# Patient Record
Sex: FEMALE | Race: WHITE | NOT HISPANIC OR LATINO | URBAN - METROPOLITAN AREA
[De-identification: names, ages, dates, MRNs, and addresses within clinical notes are randomized per-mention and may not be internally consistent; named-entity substitution may affect disease eponyms.]

---

## 2018-02-02 ENCOUNTER — OUTPATIENT (OUTPATIENT)
Dept: OUTPATIENT SERVICES | Facility: HOSPITAL | Age: 78
LOS: 1 days | Discharge: HOME | End: 2018-02-02

## 2018-02-02 DIAGNOSIS — M54.9 DORSALGIA, UNSPECIFIED: ICD-10-CM

## 2018-03-23 ENCOUNTER — OUTPATIENT (OUTPATIENT)
Dept: OUTPATIENT SERVICES | Facility: HOSPITAL | Age: 78
LOS: 1 days | Discharge: HOME | End: 2018-03-23

## 2018-03-23 VITALS
RESPIRATION RATE: 16 BRPM | TEMPERATURE: 99 F | SYSTOLIC BLOOD PRESSURE: 132 MMHG | OXYGEN SATURATION: 96 % | HEART RATE: 96 BPM | HEIGHT: 62 IN | DIASTOLIC BLOOD PRESSURE: 60 MMHG | WEIGHT: 175.93 LBS

## 2018-03-23 DIAGNOSIS — Z01.818 ENCOUNTER FOR OTHER PREPROCEDURAL EXAMINATION: ICD-10-CM

## 2018-03-23 DIAGNOSIS — D68.8 OTHER SPECIFIED COAGULATION DEFECTS: ICD-10-CM

## 2018-03-23 DIAGNOSIS — Z98.84 BARIATRIC SURGERY STATUS: Chronic | ICD-10-CM

## 2018-03-23 DIAGNOSIS — M21.969 UNSPECIFIED ACQUIRED DEFORMITY OF UNSPECIFIED LOWER LEG: Chronic | ICD-10-CM

## 2018-03-23 LAB
ALBUMIN SERPL ELPH-MCNC: 4.3 G/DL — SIGNIFICANT CHANGE UP (ref 3.5–5.2)
ALP SERPL-CCNC: 125 U/L — HIGH (ref 30–115)
ALT FLD-CCNC: 58 U/L — HIGH (ref 0–41)
ANION GAP SERPL CALC-SCNC: 16 MMOL/L — HIGH (ref 7–14)
APPEARANCE UR: CLEAR — SIGNIFICANT CHANGE UP
APTT BLD: 27.7 SEC — SIGNIFICANT CHANGE UP (ref 27–39.2)
AST SERPL-CCNC: 64 U/L — HIGH (ref 0–41)
BACTERIA # UR AUTO: (no result) /HPF
BASOPHILS # BLD AUTO: 0.02 K/UL — SIGNIFICANT CHANGE UP (ref 0–0.2)
BASOPHILS NFR BLD AUTO: 0.6 % — SIGNIFICANT CHANGE UP (ref 0–1)
BILIRUB SERPL-MCNC: 1.3 MG/DL — HIGH (ref 0.2–1.2)
BILIRUB UR-MCNC: SIGNIFICANT CHANGE UP
BLD GP AB SCN SERPL QL: SIGNIFICANT CHANGE UP
BUN SERPL-MCNC: 24 MG/DL — HIGH (ref 10–20)
CALCIUM SERPL-MCNC: 9.1 MG/DL — SIGNIFICANT CHANGE UP (ref 8.5–10.1)
CHLORIDE SERPL-SCNC: 100 MMOL/L — SIGNIFICANT CHANGE UP (ref 98–110)
CO2 SERPL-SCNC: 25 MMOL/L — SIGNIFICANT CHANGE UP (ref 17–32)
COD CRY URNS QL: (no result) /HPF
COLOR SPEC: YELLOW — SIGNIFICANT CHANGE UP
CREAT SERPL-MCNC: 1 MG/DL — SIGNIFICANT CHANGE UP (ref 0.7–1.5)
DIFF PNL FLD: NEGATIVE — SIGNIFICANT CHANGE UP
EOSINOPHIL # BLD AUTO: 0.01 K/UL — SIGNIFICANT CHANGE UP (ref 0–0.7)
EOSINOPHIL NFR BLD AUTO: 0.3 % — SIGNIFICANT CHANGE UP (ref 0–8)
GLUCOSE SERPL-MCNC: 222 MG/DL — HIGH (ref 70–99)
GLUCOSE UR QL: NEGATIVE MG/DL — SIGNIFICANT CHANGE UP
HCT VFR BLD CALC: 38.1 % — SIGNIFICANT CHANGE UP (ref 37–47)
HGB BLD-MCNC: 12.3 G/DL — SIGNIFICANT CHANGE UP (ref 12–16)
IMM GRANULOCYTES NFR BLD AUTO: 0.3 % — SIGNIFICANT CHANGE UP (ref 0.1–0.3)
INR BLD: 1.13 RATIO — SIGNIFICANT CHANGE UP (ref 0.65–1.3)
KETONES UR-MCNC: NEGATIVE — SIGNIFICANT CHANGE UP
LEUKOCYTE ESTERASE UR-ACNC: NEGATIVE — SIGNIFICANT CHANGE UP
LYMPHOCYTES # BLD AUTO: 0.76 K/UL — LOW (ref 1.2–3.4)
LYMPHOCYTES # BLD AUTO: 21.8 % — SIGNIFICANT CHANGE UP (ref 20.5–51.1)
MCHC RBC-ENTMCNC: 32.3 G/DL — SIGNIFICANT CHANGE UP (ref 32–37)
MCHC RBC-ENTMCNC: 32.9 PG — HIGH (ref 27–31)
MCV RBC AUTO: 101.9 FL — HIGH (ref 81–99)
MONOCYTES # BLD AUTO: 0.08 K/UL — LOW (ref 0.1–0.6)
MONOCYTES NFR BLD AUTO: 2.3 % — SIGNIFICANT CHANGE UP (ref 1.7–9.3)
NEUTROPHILS # BLD AUTO: 2.6 K/UL — SIGNIFICANT CHANGE UP (ref 1.4–6.5)
NEUTROPHILS NFR BLD AUTO: 74.7 % — SIGNIFICANT CHANGE UP (ref 42.2–75.2)
NITRITE UR-MCNC: POSITIVE — SIGNIFICANT CHANGE UP
NRBC # BLD: 0 /100 WBCS — SIGNIFICANT CHANGE UP (ref 0–0)
PH UR: 6 — SIGNIFICANT CHANGE UP (ref 5–8)
PLATELET # BLD AUTO: 177 K/UL — SIGNIFICANT CHANGE UP (ref 130–400)
POTASSIUM SERPL-MCNC: 4.2 MMOL/L — SIGNIFICANT CHANGE UP (ref 3.5–5)
POTASSIUM SERPL-SCNC: 4.2 MMOL/L — SIGNIFICANT CHANGE UP (ref 3.5–5)
PROT SERPL-MCNC: 6.3 G/DL — SIGNIFICANT CHANGE UP (ref 6–8)
PROT UR-MCNC: 300 MG/DL — SIGNIFICANT CHANGE UP
PROTHROM AB SERPL-ACNC: 12.2 SEC — SIGNIFICANT CHANGE UP (ref 9.95–12.87)
RBC # BLD: 3.74 M/UL — LOW (ref 4.2–5.4)
RBC # FLD: 18.9 % — HIGH (ref 11.5–14.5)
SODIUM SERPL-SCNC: 141 MMOL/L — SIGNIFICANT CHANGE UP (ref 135–146)
SP GR SPEC: 1.02 — SIGNIFICANT CHANGE UP (ref 1.01–1.03)
TYPE + AB SCN PNL BLD: SIGNIFICANT CHANGE UP
UROBILINOGEN FLD QL: NEGATIVE MG/DL — SIGNIFICANT CHANGE UP
WBC # BLD: 3.48 K/UL — LOW (ref 4.8–10.8)
WBC # FLD AUTO: 3.48 K/UL — LOW (ref 4.8–10.8)

## 2018-03-23 RX ORDER — CARBIDOPA AND LEVODOPA 25; 100 MG/1; MG/1
0 TABLET ORAL
Qty: 0 | Refills: 0 | COMMUNITY

## 2018-03-23 NOTE — H&P PST ADULT - ATTENDING COMMENTS
04-06-18 @ 07:04  YONATAN MEADE  [id number]  77yFemale    I have discussed the risks and benefits of kyphoplasty with the patient including but not limited to bleeding, infection, weakness, numbness, paralysis, CSF leak requiring repair, no change or increase in pain or other symptoms, change in bowel/bladder/sexual function, need for decompression, revision, repeat or other procedure(s).  I have also discussed the possibility of the following:  Use of allografts/autografts/biologics/hardware  Adjacent segment progression requiring treatment(s)  Extended hospital/rehab/skilled nursing facility stay  Need for flat bedrest    I have also discussed the alternatives to the procedure as well as options for no treatment and/or expected courses for all.  I also discussed the role of any MD/PA first assistant and the patient assents to their participation.  All questions were answered and the patient wishes to proceed.

## 2018-04-05 NOTE — ASU PATIENT PROFILE, ADULT - PMH
Fibromyalgia    GERD (gastroesophageal reflux disease)    Hypothyroidism  no recent dosage changes  Obesity  s/p gastric bypass ~15 yrs ago ~100 lb loss  DWAYNE on CPAP    Osteoarthritis    Psoriatic arthritis    Rheumatoid arthritis    Seasonal allergies    Tremors of nervous system  essential tremors

## 2018-04-06 ENCOUNTER — INPATIENT (INPATIENT)
Facility: HOSPITAL | Age: 78
LOS: 11 days | Discharge: NURSING, REHAB OR LONG-TERM CARE | End: 2018-04-18
Attending: NEUROLOGICAL SURGERY | Admitting: NEUROLOGICAL SURGERY

## 2018-04-06 ENCOUNTER — RESULT REVIEW (OUTPATIENT)
Age: 78
End: 2018-04-06

## 2018-04-06 VITALS
DIASTOLIC BLOOD PRESSURE: 75 MMHG | SYSTOLIC BLOOD PRESSURE: 148 MMHG | HEART RATE: 94 BPM | HEIGHT: 62 IN | WEIGHT: 175.93 LBS | RESPIRATION RATE: 18 BRPM | TEMPERATURE: 98 F

## 2018-04-06 DIAGNOSIS — K21.9 GASTRO-ESOPHAGEAL REFLUX DISEASE WITHOUT ESOPHAGITIS: ICD-10-CM

## 2018-04-06 DIAGNOSIS — E66.9 OBESITY, UNSPECIFIED: ICD-10-CM

## 2018-04-06 DIAGNOSIS — G25.0 ESSENTIAL TREMOR: ICD-10-CM

## 2018-04-06 DIAGNOSIS — M79.7 FIBROMYALGIA: ICD-10-CM

## 2018-04-06 DIAGNOSIS — Z98.84 BARIATRIC SURGERY STATUS: ICD-10-CM

## 2018-04-06 DIAGNOSIS — Z98.84 BARIATRIC SURGERY STATUS: Chronic | ICD-10-CM

## 2018-04-06 DIAGNOSIS — M21.969 UNSPECIFIED ACQUIRED DEFORMITY OF UNSPECIFIED LOWER LEG: Chronic | ICD-10-CM

## 2018-04-06 DIAGNOSIS — M19.90 UNSPECIFIED OSTEOARTHRITIS, UNSPECIFIED SITE: ICD-10-CM

## 2018-04-06 DIAGNOSIS — E03.9 HYPOTHYROIDISM, UNSPECIFIED: ICD-10-CM

## 2018-04-06 DIAGNOSIS — M06.9 RHEUMATOID ARTHRITIS, UNSPECIFIED: ICD-10-CM

## 2018-04-06 DIAGNOSIS — L40.50 ARTHROPATHIC PSORIASIS, UNSPECIFIED: ICD-10-CM
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
## 2018-04-06 DIAGNOSIS — G47.33 OBSTRUCTIVE SLEEP APNEA (ADULT) (PEDIATRIC): ICD-10-CM

## 2018-04-06 DIAGNOSIS — M80.08XA AGE-RELATED OSTEOPOROSIS WITH CURRENT PATHOLOGICAL FRACTURE, VERTEBRA(E), INITIAL ENCOUNTER FOR FRACTURE: ICD-10-CM

## 2018-04-06 DIAGNOSIS — T81.718A COMPLICATION OF OTHER ARTERY FOLLOWING A PROCEDURE, NOT ELSEWHERE CLASSIFIED, INITIAL ENCOUNTER: ICD-10-CM

## 2018-04-06 DIAGNOSIS — I26.99 OTHER PULMONARY EMBOLISM WITHOUT ACUTE COR PULMONALE: ICD-10-CM

## 2018-04-06 DIAGNOSIS — K29.70 GASTRITIS, UNSPECIFIED, WITHOUT BLEEDING: ICD-10-CM

## 2018-04-06 DIAGNOSIS — J98.11 ATELECTASIS: ICD-10-CM

## 2018-04-06 RX ORDER — FENTANYL CITRATE 50 UG/ML
1 INJECTION INTRAVENOUS
Qty: 0 | Refills: 0 | Status: DISCONTINUED | OUTPATIENT
Start: 2018-04-06 | End: 2018-04-13

## 2018-04-06 RX ORDER — SENNA PLUS 8.6 MG/1
2 TABLET ORAL AT BEDTIME
Qty: 0 | Refills: 0 | Status: DISCONTINUED | OUTPATIENT
Start: 2018-04-06 | End: 2018-04-18

## 2018-04-06 RX ORDER — CARBIDOPA AND LEVODOPA 25; 100 MG/1; MG/1
1 TABLET ORAL THREE TIMES A DAY
Qty: 0 | Refills: 0 | Status: DISCONTINUED | OUTPATIENT
Start: 2018-04-06 | End: 2018-04-18

## 2018-04-06 RX ORDER — ACETAMINOPHEN 500 MG
650 TABLET ORAL EVERY 4 HOURS
Qty: 0 | Refills: 0 | Status: DISCONTINUED | OUTPATIENT
Start: 2018-04-06 | End: 2018-04-18

## 2018-04-06 RX ORDER — METHOCARBAMOL 500 MG/1
1 TABLET, FILM COATED ORAL
Qty: 30 | Refills: 0 | OUTPATIENT
Start: 2018-04-06

## 2018-04-06 RX ORDER — MORPHINE SULFATE 50 MG/1
4 CAPSULE, EXTENDED RELEASE ORAL
Qty: 0 | Refills: 0 | Status: DISCONTINUED | OUTPATIENT
Start: 2018-04-06 | End: 2018-04-06

## 2018-04-06 RX ORDER — OXYCODONE AND ACETAMINOPHEN 5; 325 MG/1; MG/1
2 TABLET ORAL EVERY 6 HOURS
Qty: 0 | Refills: 0 | Status: DISCONTINUED | OUTPATIENT
Start: 2018-04-06 | End: 2018-04-06

## 2018-04-06 RX ORDER — ONDANSETRON 8 MG/1
4 TABLET, FILM COATED ORAL EVERY 6 HOURS
Qty: 0 | Refills: 0 | Status: DISCONTINUED | OUTPATIENT
Start: 2018-04-06 | End: 2018-04-18

## 2018-04-06 RX ORDER — METHOCARBAMOL 500 MG/1
750 TABLET, FILM COATED ORAL EVERY 8 HOURS
Qty: 0 | Refills: 0 | Status: DISCONTINUED | OUTPATIENT
Start: 2018-04-06 | End: 2018-04-10

## 2018-04-06 RX ORDER — ONDANSETRON 8 MG/1
4 TABLET, FILM COATED ORAL ONCE
Qty: 0 | Refills: 0 | Status: DISCONTINUED | OUTPATIENT
Start: 2018-04-06 | End: 2018-04-06

## 2018-04-06 RX ORDER — MONTELUKAST 4 MG/1
10 TABLET, CHEWABLE ORAL DAILY
Qty: 0 | Refills: 0 | Status: DISCONTINUED | OUTPATIENT
Start: 2018-04-06 | End: 2018-04-18

## 2018-04-06 RX ORDER — MORPHINE SULFATE 50 MG/1
3 CAPSULE, EXTENDED RELEASE ORAL ONCE
Qty: 0 | Refills: 0 | Status: DISCONTINUED | OUTPATIENT
Start: 2018-04-06 | End: 2018-04-06

## 2018-04-06 RX ORDER — SODIUM CHLORIDE 9 MG/ML
1000 INJECTION INTRAMUSCULAR; INTRAVENOUS; SUBCUTANEOUS
Qty: 0 | Refills: 0 | Status: DISCONTINUED | OUTPATIENT
Start: 2018-04-06 | End: 2018-04-10

## 2018-04-06 RX ORDER — MORPHINE SULFATE 50 MG/1
30 CAPSULE, EXTENDED RELEASE ORAL
Qty: 0 | Refills: 0 | Status: DISCONTINUED | OUTPATIENT
Start: 2018-04-06 | End: 2018-04-13

## 2018-04-06 RX ORDER — ACETAMINOPHEN 500 MG
1000 TABLET ORAL ONCE
Qty: 0 | Refills: 0 | Status: DISCONTINUED | OUTPATIENT
Start: 2018-04-06 | End: 2018-04-18

## 2018-04-06 RX ORDER — DOCUSATE SODIUM 100 MG
100 CAPSULE ORAL THREE TIMES A DAY
Qty: 0 | Refills: 0 | Status: DISCONTINUED | OUTPATIENT
Start: 2018-04-06 | End: 2018-04-18

## 2018-04-06 RX ORDER — CEFAZOLIN SODIUM 1 G
1000 VIAL (EA) INJECTION EVERY 8 HOURS
Qty: 0 | Refills: 0 | Status: COMPLETED | OUTPATIENT
Start: 2018-04-06 | End: 2018-04-07

## 2018-04-06 RX ORDER — LEVOTHYROXINE SODIUM 125 MCG
75 TABLET ORAL DAILY
Qty: 0 | Refills: 0 | Status: DISCONTINUED | OUTPATIENT
Start: 2018-04-06 | End: 2018-04-18

## 2018-04-06 RX ADMIN — MORPHINE SULFATE 4 MILLIGRAM(S): 50 CAPSULE, EXTENDED RELEASE ORAL at 11:00

## 2018-04-06 RX ADMIN — CARBIDOPA AND LEVODOPA 1 TABLET(S): 25; 100 TABLET ORAL at 21:55

## 2018-04-06 RX ADMIN — MORPHINE SULFATE 4 MILLIGRAM(S): 50 CAPSULE, EXTENDED RELEASE ORAL at 10:36

## 2018-04-06 RX ADMIN — MORPHINE SULFATE 4 MILLIGRAM(S): 50 CAPSULE, EXTENDED RELEASE ORAL at 11:32

## 2018-04-06 RX ADMIN — MORPHINE SULFATE 3 MILLIGRAM(S): 50 CAPSULE, EXTENDED RELEASE ORAL at 09:56

## 2018-04-06 RX ADMIN — MORPHINE SULFATE 3 MILLIGRAM(S): 50 CAPSULE, EXTENDED RELEASE ORAL at 10:29

## 2018-04-06 RX ADMIN — MORPHINE SULFATE 4 MILLIGRAM(S): 50 CAPSULE, EXTENDED RELEASE ORAL at 10:45

## 2018-04-06 RX ADMIN — METHOCARBAMOL 750 MILLIGRAM(S): 500 TABLET, FILM COATED ORAL at 21:55

## 2018-04-06 RX ADMIN — MONTELUKAST 10 MILLIGRAM(S): 4 TABLET, CHEWABLE ORAL at 21:55

## 2018-04-06 RX ADMIN — SENNA PLUS 2 TABLET(S): 8.6 TABLET ORAL at 21:54

## 2018-04-06 RX ADMIN — Medication 100 MILLIGRAM(S): at 22:17

## 2018-04-06 RX ADMIN — Medication 100 MILLIGRAM(S): at 21:55

## 2018-04-06 NOTE — ASU DISCHARGE PLAN (ADULT/PEDIATRIC). - NOTIFY
Numbness, color, or temperature change to extremity/Bleeding that does not stop/Pain not relieved by Medications/Persistent Nausea and Vomiting/Fever greater than 101/Swelling that continues

## 2018-04-06 NOTE — PRE-ANESTHESIA EVALUATION ADULT - NSANTHADDINFOFT_GEN_ALL_CORE
R/B/A explained to Patient all questions ans.   Plan GETA  Case d/w CRNA  Consult reviewed  Will give stress dose steroids

## 2018-04-06 NOTE — ASU DISCHARGE PLAN (ADULT/PEDIATRIC). - MEDICATION SUMMARY - MEDICATIONS TO TAKE
I will START or STAY ON the medications listed below when I get home from the hospital:    predniSONE 10 mg oral tablet  -- 1 tab(s) by mouth once a day  -- Indication: For S32.000A,S22.000A,09390,24320,22515X2    morphine 30 mg oral capsule, extended release  -- orally 2 times a day  -- Indication: For S32.000A,S22.000A,84645,92730,22515X2    morphine 15 mg oral tablet  -- orally once a day 15 mg er) @ 4pm  -- Indication: For S32.000A,S22.000A,32772,51839,22515X2    fentaNYL 12 mcg/hr transdermal film, extended release  -- 1 patch by transdermal patch every 72 hours  -- Indication: For S32.000A,S22.000A,12814,55440,22515X2    Savella 25 mg oral tablet  -- 1 tab(s) by mouth 2 times a day  -- Indication: For S32.000A,S22.000A,48234,09687,22515X2    ZyrTEC 10 mg oral tablet  -- 1 tab(s) by mouth once a day  -- Indication: For S32.000A,S22.000A,78099,63507,22515X2    methotrexate 10 mg oral tablet  -- orally once a week (q12h) on tuesdays  -- Indication: For S32.000A,S22.000A,61600,60724,22515X2    carbidopa-levodopa 10 mg-100 mg oral tablet  -- orally 3 times a day  -- Indication: For S32.000A,S22.000A,17729,33368,22515X2    Actemra 20 mg/mL intravenous solution  -- intravenous once a month, last infusion ~2 wks ago  -- Indication: For S32.000A,S22.000A,71748,36099,22515X2    montelukast 10 mg oral tablet  -- 1 tab(s) by mouth once a day  -- Indication: For S32.000A,S22.000A,23025,98359,22515X2    methocarbamol 750 mg oral tablet  -- 1 tab(s) by mouth every 8 hours   -- May cause drowsiness.  Alcohol may intensify this effect.  Use care when operating dangerous machinery.    -- Indication: For S32.000A,S22.000A,37084,99344,22515X2    levothyroxine 75 mcg (0.075 mg) oral tablet  -- 1 tab(s) by mouth once a day  -- Indication: For S32.000A,S22.000A,10995,70331,22515X2

## 2018-04-06 NOTE — BRIEF OPERATIVE NOTE - PROCEDURE
<<-----Click on this checkbox to enter Procedure Kyphoplasty at multiple levels  04/06/2018  T9, T12, L1, L2, L3  Active  [id number]

## 2018-04-06 NOTE — CHART NOTE - NSCHARTNOTEFT_GEN_A_CORE
PACU ANESTHESIA ADMISSION NOTE      Procedure: Kyphoplasty at multiple levels: T9, T12, L1, L2, L3    Post op diagnosis:  Compression fracture of lumbar spine, non-traumatic  Compression fracture of body of thoracic vertebra  Osteoporosis      ____  Intubated  TV:______       Rate: ______      FiO2: ______    _x___  Patent Airway    _x___  Full return of protective reflexes    _x___  Full recovery from anesthesia / back to baseline status    Vitals:  T(F): 98.6  BP: 185/86  HR: 92  RR: 17  SpO2: 94%    Mental Status:  _x___ Awake   _____ Alert   _____ Drowsy   _____ Sedated    Nausea/Vomiting:  _x___  NO       ______Yes,   See Post - Op Orders         Pain Scale (0-10):  __0___    Treatment: _x___ None    ____ See Post - Op/PCA Orders    Post - Operative Fluids:   ___ Oral   __x__ See Post - Op Orders    Plan: Discharge:   _x___Home       _____Floor     _____Critical Care    _____  Other:_________________    Comments:  No anesthesia issues or complications noted.  Discharge when criteria met.

## 2018-04-06 NOTE — CHART NOTE - NSCHARTNOTEFT_GEN_A_CORE
04-06-18 @ 07:07  YONATAN MEADE  [id number]  77yFemale    I have discussed the risks and benefits of kyphoplasty with the patient including but not limited to bleeding, infection, weakness, numbness, paralysis, CSF leak requiring repair, no change or increase in pain or other symptoms, change in bowel/bladder/sexual function, need for decompression, revision, repeat or other procedure(s).  I have also discussed the possibility of the following:    Use of allografts/autografts/biologics/hardware  Extended hospital/rehab/skilled nursing facility stay  Need for flat bedrest    I have also discussed the alternatives to the procedure as well as options for no treatment and/or expected courses for all.  I also discussed the role of any MD/PA first assistant and the patient assents to their participation.  All questions were answered and the patient wishes to proceed.

## 2018-04-06 NOTE — BRIEF OPERATIVE NOTE - POST-OP DX
Compression fracture of body of thoracic vertebra  04/06/2018    Nadir Hayes  Compression fracture of lumbar spine, non-traumatic  04/06/2018    Nadir Hayes  Osteoporosis  04/06/2018    Nadir Hayes

## 2018-04-07 LAB
APPEARANCE UR: CLEAR — SIGNIFICANT CHANGE UP
BILIRUB UR-MCNC: NEGATIVE — SIGNIFICANT CHANGE UP
COLOR SPEC: YELLOW — SIGNIFICANT CHANGE UP
DIFF PNL FLD: (no result)
GLUCOSE UR QL: NEGATIVE MG/DL — SIGNIFICANT CHANGE UP
KETONES UR-MCNC: (no result)
LEUKOCYTE ESTERASE UR-ACNC: (no result)
NITRITE UR-MCNC: NEGATIVE — SIGNIFICANT CHANGE UP
PH UR: 7.5 — SIGNIFICANT CHANGE UP (ref 5–8)
PROT UR-MCNC: NEGATIVE MG/DL — SIGNIFICANT CHANGE UP
RBC CASTS # UR COMP ASSIST: SIGNIFICANT CHANGE UP /HPF
SP GR SPEC: 1.01 — SIGNIFICANT CHANGE UP (ref 1.01–1.03)
UROBILINOGEN FLD QL: 0.2 MG/DL — SIGNIFICANT CHANGE UP (ref 0.2–0.2)
WBC UR QL: SIGNIFICANT CHANGE UP /HPF

## 2018-04-07 RX ORDER — MORPHINE SULFATE 50 MG/1
15 CAPSULE, EXTENDED RELEASE ORAL ONCE
Qty: 0 | Refills: 0 | Status: DISCONTINUED | OUTPATIENT
Start: 2018-04-07 | End: 2018-04-07

## 2018-04-07 RX ORDER — MORPHINE SULFATE 50 MG/1
4 CAPSULE, EXTENDED RELEASE ORAL
Qty: 0 | Refills: 0 | Status: DISCONTINUED | OUTPATIENT
Start: 2018-04-07 | End: 2018-04-12

## 2018-04-07 RX ORDER — MORPHINE SULFATE 50 MG/1
30 CAPSULE, EXTENDED RELEASE ORAL
Qty: 0 | Refills: 0 | Status: DISCONTINUED | OUTPATIENT
Start: 2018-04-07 | End: 2018-04-10

## 2018-04-07 RX ADMIN — SENNA PLUS 2 TABLET(S): 8.6 TABLET ORAL at 21:19

## 2018-04-07 RX ADMIN — CARBIDOPA AND LEVODOPA 1 TABLET(S): 25; 100 TABLET ORAL at 21:19

## 2018-04-07 RX ADMIN — FENTANYL CITRATE 1 PATCH: 50 INJECTION INTRAVENOUS at 11:10

## 2018-04-07 RX ADMIN — CARBIDOPA AND LEVODOPA 1 TABLET(S): 25; 100 TABLET ORAL at 05:23

## 2018-04-07 RX ADMIN — MORPHINE SULFATE 30 MILLILITER(S): 50 CAPSULE, EXTENDED RELEASE ORAL at 14:34

## 2018-04-07 RX ADMIN — MORPHINE SULFATE 4 MILLIGRAM(S): 50 CAPSULE, EXTENDED RELEASE ORAL at 12:25

## 2018-04-07 RX ADMIN — Medication 75 MICROGRAM(S): at 05:23

## 2018-04-07 RX ADMIN — METHOCARBAMOL 750 MILLIGRAM(S): 500 TABLET, FILM COATED ORAL at 05:23

## 2018-04-07 RX ADMIN — METHOCARBAMOL 750 MILLIGRAM(S): 500 TABLET, FILM COATED ORAL at 21:19

## 2018-04-07 RX ADMIN — Medication 100 MILLIGRAM(S): at 05:24

## 2018-04-07 RX ADMIN — MORPHINE SULFATE 4 MILLIGRAM(S): 50 CAPSULE, EXTENDED RELEASE ORAL at 11:55

## 2018-04-07 RX ADMIN — Medication 100 MILLIGRAM(S): at 05:23

## 2018-04-07 RX ADMIN — METHOCARBAMOL 750 MILLIGRAM(S): 500 TABLET, FILM COATED ORAL at 13:44

## 2018-04-07 RX ADMIN — Medication 100 MILLIGRAM(S): at 13:44

## 2018-04-07 RX ADMIN — MONTELUKAST 10 MILLIGRAM(S): 4 TABLET, CHEWABLE ORAL at 11:11

## 2018-04-07 RX ADMIN — MORPHINE SULFATE 30 MILLIGRAM(S): 50 CAPSULE, EXTENDED RELEASE ORAL at 05:25

## 2018-04-07 RX ADMIN — MORPHINE SULFATE 30 MILLILITER(S): 50 CAPSULE, EXTENDED RELEASE ORAL at 23:51

## 2018-04-07 RX ADMIN — SODIUM CHLORIDE 75 MILLILITER(S): 9 INJECTION INTRAMUSCULAR; INTRAVENOUS; SUBCUTANEOUS at 14:24

## 2018-04-07 RX ADMIN — CARBIDOPA AND LEVODOPA 1 TABLET(S): 25; 100 TABLET ORAL at 13:44

## 2018-04-07 RX ADMIN — Medication 10 MILLIGRAM(S): at 05:23

## 2018-04-07 RX ADMIN — Medication 100 MILLIGRAM(S): at 21:19

## 2018-04-07 RX ADMIN — Medication 650 MILLIGRAM(S): at 10:11

## 2018-04-07 NOTE — CONSULT NOTE ADULT - SUBJECTIVE AND OBJECTIVE BOX
S/P back surgery under [facility] POD 1  Pain scale 6-8/10.  Chronic pain med user.  NKDA  PE WNL  Recommend:  PCA Morphine,  start Toradol 30mg. IV q 6 hours with nl renal function.  Add Tylenol 325mg PO q 4 hrs with nl LFT    Will follow up

## 2018-04-07 NOTE — PHYSICAL THERAPY INITIAL EVALUATION ADULT - SPECIFY REASON(S)
RN requested to hold PT at this time secondary awaiting for CT scan pt reported severe pain 10/10 back area. Advised PT to f/u tomorrow.

## 2018-04-08 RX ADMIN — Medication 1 TABLET(S): at 11:49

## 2018-04-08 RX ADMIN — METHOCARBAMOL 750 MILLIGRAM(S): 500 TABLET, FILM COATED ORAL at 05:57

## 2018-04-08 RX ADMIN — Medication 650 MILLIGRAM(S): at 00:21

## 2018-04-08 RX ADMIN — MORPHINE SULFATE 30 MILLIGRAM(S): 50 CAPSULE, EXTENDED RELEASE ORAL at 18:07

## 2018-04-08 RX ADMIN — CARBIDOPA AND LEVODOPA 1 TABLET(S): 25; 100 TABLET ORAL at 05:56

## 2018-04-08 RX ADMIN — Medication 100 MILLIGRAM(S): at 05:56

## 2018-04-08 RX ADMIN — CARBIDOPA AND LEVODOPA 1 TABLET(S): 25; 100 TABLET ORAL at 13:54

## 2018-04-08 RX ADMIN — Medication 75 MICROGRAM(S): at 05:56

## 2018-04-08 RX ADMIN — MORPHINE SULFATE 30 MILLIGRAM(S): 50 CAPSULE, EXTENDED RELEASE ORAL at 18:37

## 2018-04-08 RX ADMIN — MORPHINE SULFATE 30 MILLIGRAM(S): 50 CAPSULE, EXTENDED RELEASE ORAL at 05:59

## 2018-04-08 RX ADMIN — MONTELUKAST 10 MILLIGRAM(S): 4 TABLET, CHEWABLE ORAL at 11:49

## 2018-04-08 RX ADMIN — METHOCARBAMOL 750 MILLIGRAM(S): 500 TABLET, FILM COATED ORAL at 13:54

## 2018-04-08 RX ADMIN — METHOCARBAMOL 750 MILLIGRAM(S): 500 TABLET, FILM COATED ORAL at 22:34

## 2018-04-08 RX ADMIN — Medication 10 MILLIGRAM(S): at 05:57

## 2018-04-08 RX ADMIN — Medication 100 MILLIGRAM(S): at 13:54

## 2018-04-08 RX ADMIN — Medication 100 MILLIGRAM(S): at 22:33

## 2018-04-08 RX ADMIN — SENNA PLUS 2 TABLET(S): 8.6 TABLET ORAL at 22:33

## 2018-04-08 RX ADMIN — CARBIDOPA AND LEVODOPA 1 TABLET(S): 25; 100 TABLET ORAL at 22:33

## 2018-04-09 LAB — SURGICAL PATHOLOGY STUDY: SIGNIFICANT CHANGE UP

## 2018-04-09 RX ORDER — MORPHINE SULFATE 50 MG/1
15 CAPSULE, EXTENDED RELEASE ORAL
Qty: 0 | Refills: 0 | Status: DISCONTINUED | OUTPATIENT
Start: 2018-04-09 | End: 2018-04-16

## 2018-04-09 RX ORDER — METHOCARBAMOL 500 MG/1
750 TABLET, FILM COATED ORAL EVERY 6 HOURS
Qty: 0 | Refills: 0 | Status: DISCONTINUED | OUTPATIENT
Start: 2018-04-09 | End: 2018-04-18

## 2018-04-09 RX ORDER — MORPHINE SULFATE 50 MG/1
15 CAPSULE, EXTENDED RELEASE ORAL DAILY
Qty: 0 | Refills: 0 | Status: DISCONTINUED | OUTPATIENT
Start: 2018-04-09 | End: 2018-04-09

## 2018-04-09 RX ORDER — HEPARIN SODIUM 5000 [USP'U]/ML
5000 INJECTION INTRAVENOUS; SUBCUTANEOUS EVERY 8 HOURS
Qty: 0 | Refills: 0 | Status: DISCONTINUED | OUTPATIENT
Start: 2018-04-09 | End: 2018-04-18

## 2018-04-09 RX ADMIN — METHOCARBAMOL 750 MILLIGRAM(S): 500 TABLET, FILM COATED ORAL at 14:14

## 2018-04-09 RX ADMIN — Medication 75 MICROGRAM(S): at 06:31

## 2018-04-09 RX ADMIN — HEPARIN SODIUM 5000 UNIT(S): 5000 INJECTION INTRAVENOUS; SUBCUTANEOUS at 21:29

## 2018-04-09 RX ADMIN — Medication 1 TABLET(S): at 18:01

## 2018-04-09 RX ADMIN — CARBIDOPA AND LEVODOPA 1 TABLET(S): 25; 100 TABLET ORAL at 12:03

## 2018-04-09 RX ADMIN — METHOCARBAMOL 750 MILLIGRAM(S): 500 TABLET, FILM COATED ORAL at 18:02

## 2018-04-09 RX ADMIN — Medication 650 MILLIGRAM(S): at 09:43

## 2018-04-09 RX ADMIN — CARBIDOPA AND LEVODOPA 1 TABLET(S): 25; 100 TABLET ORAL at 06:31

## 2018-04-09 RX ADMIN — Medication 1 TABLET(S): at 06:31

## 2018-04-09 RX ADMIN — Medication 100 MILLIGRAM(S): at 12:03

## 2018-04-09 RX ADMIN — Medication 100 MILLIGRAM(S): at 21:29

## 2018-04-09 RX ADMIN — MORPHINE SULFATE 30 MILLIGRAM(S): 50 CAPSULE, EXTENDED RELEASE ORAL at 06:31

## 2018-04-09 RX ADMIN — Medication 100 MILLIGRAM(S): at 06:32

## 2018-04-09 RX ADMIN — CARBIDOPA AND LEVODOPA 1 TABLET(S): 25; 100 TABLET ORAL at 21:30

## 2018-04-09 RX ADMIN — SENNA PLUS 2 TABLET(S): 8.6 TABLET ORAL at 21:29

## 2018-04-09 RX ADMIN — MORPHINE SULFATE 30 MILLIGRAM(S): 50 CAPSULE, EXTENDED RELEASE ORAL at 18:03

## 2018-04-09 RX ADMIN — MORPHINE SULFATE 30 MILLILITER(S): 50 CAPSULE, EXTENDED RELEASE ORAL at 12:35

## 2018-04-09 RX ADMIN — MONTELUKAST 10 MILLIGRAM(S): 4 TABLET, CHEWABLE ORAL at 14:14

## 2018-04-09 RX ADMIN — METHOCARBAMOL 750 MILLIGRAM(S): 500 TABLET, FILM COATED ORAL at 21:29

## 2018-04-09 RX ADMIN — Medication 10 MILLIGRAM(S): at 06:31

## 2018-04-09 RX ADMIN — HEPARIN SODIUM 5000 UNIT(S): 5000 INJECTION INTRAVENOUS; SUBCUTANEOUS at 14:14

## 2018-04-09 RX ADMIN — MORPHINE SULFATE 30 MILLIGRAM(S): 50 CAPSULE, EXTENDED RELEASE ORAL at 18:01

## 2018-04-09 RX ADMIN — METHOCARBAMOL 750 MILLIGRAM(S): 500 TABLET, FILM COATED ORAL at 06:31

## 2018-04-09 NOTE — PHYSICAL THERAPY INITIAL EVALUATION ADULT - PERTINENT HX OF CURRENT PROBLEM, REHAB EVAL
weakness and LBP, difficulty ambulating. patient has a HHA. recently started on carbidopa for tremors.

## 2018-04-09 NOTE — CONSULT NOTE ADULT - SUBJECTIVE AND OBJECTIVE BOX
HPI:  77 y.o. woman with PMH as below admitted 2018 with osteoporosis and thoracic and lumbar compression fractures. Dr. Garcia performed T9, T12, L1, L2, and L3 kyphoplasties.        PAST MEDICAL & SURGICAL HISTORY:  Seasonal allergies  DWAYNE on CPAP  Obesity: s/p gastric bypass ~15 yrs ago ~100 lb loss  Osteoarthritis  Rheumatoid arthritis  Fibromyalgia  Tremors of nervous system: essential tremors  Psoriatic arthritis  GERD (gastroesophageal reflux disease)  Hypothyroidism: no recent dosage changes  Ankle deformity: s/p b/l surgical repair  H/O gastric bypass      Hospital Course:  PT held 2018. Receiving analgesics    TODAY'S SUBJECTIVE & REVIEW OF SYMPTOMS:     Constitutional WNL   Cardio WNL   Resp WNL   GI + fair appetite  Heme WNL  Endo WNL  Skin WNL  MSK + LBP, R flank pain  Neuro WNL  Cognitive WNL  Psych WNL      MEDICATIONS  (STANDING):  acetaminophen  IVPB. 1000 milliGRAM(s) IV Intermittent once  carbidopa/levodopa  10/100 1 Tablet(s) Oral three times a day  docusate sodium 100 milliGRAM(s) Oral three times a day  fentaNYL   Patch  12 MICROgram(s)/Hr 1 Patch Transdermal every 72 hours  levothyroxine 75 MICROGram(s) Oral daily  methocarbamol 750 milliGRAM(s) Oral every 8 hours  montelukast 10 milliGRAM(s) Oral daily  morphine ER Tablet 30 milliGRAM(s) Oral two times a day  morphine PCA (1 mG/mL) 30 milliLiter(s) PCA Continuous PCA Continuous  predniSONE   Tablet 10 milliGRAM(s) Oral daily  senna 2 Tablet(s) Oral at bedtime  sodium chloride 0.9%. 1000 milliLiter(s) (75 mL/Hr) IV Continuous <Continuous>  trimethoprim  160 mG/sulfamethoxazole 800 mG 1 Tablet(s) Oral two times a day    MEDICATIONS  (PRN):  acetaminophen   Tablet 650 milliGRAM(s) Oral every 4 hours PRN For Temp greater than 38 C (100.4 F)  methocarbamol 750 milliGRAM(s) Oral every 8 hours PRN Back Spasms  morphine  - Injectable 4 milliGRAM(s) IV Push every 3 hours PRN breakthrough pain  ondansetron Injectable 4 milliGRAM(s) IV Push every 6 hours PRN Nausea      FAMILY HISTORY:  Mother with OA, CVA      Allergies    adhesives (Pruritus; Rash)  Betadine (Flushing (Skin); Pruritus)  NSAIDs (Vomiting; Rash; Nausea)    Intolerances        SOCIAL HISTORY:    [  ] EtOH  [  ] Smoking  [  ] Substance abuse     Home Environment:  [  ] Home Alone  [ X ] Lives with Family--  [  ] Home Health Aid    Dwelling:  [  ] Apartment  [ X ] Private House  [  ] Adult Home  [  ] Skilled Nursing Facility      [  ] Short Term  [  ] Long Term  [ X ] Stairs 6 step entry and chairlift to basement      Elevator [  ]    FUNCTIONAL STATUS PTA: (Check all that apply)  Ambulation: [ X  ]Independent    [  ] Dependent     [  ] Non-Ambulatory  Assistive Device: [  ] SA Cane  [  ]  Q Cane  [ C ] Rollator Walker  [  ]  Wheelchair  ADL : [ X ] Independent  [  ]  Dependent       Vital Signs Last 24 Hrs  T(C): 37.2 (2018 07:38), Max: 37.2 (2018 18:00)  T(F): 98.9 (2018 07:38), Max: 99 (2018 18:00)  HR: 88 (2018 07:38) (84 - 101)  BP: 145/67 (2018 07:38) (139/66 - 170/80)  BP(mean): --  RR: 16 (2018 07:38) (16 - 18)  SpO2: 94% (2018 18:00) (94% - 94%)      PHYSICAL EXAM: Alert & Oriented X3  GENERAL: NAD, well-groomed, well-developed, obese  HEAD:  Atraumatic, Normocephalic  EYES: EOMI, PERRLA, conjunctiva and sclera clear  NECK: Supple, No JVD, Normal thyroid  CHEST/LUNG: Clear to percussion bilaterally; No rales, rhonchi, wheezing, or rubs  HEART: Regular rate and rhythm; No murmurs, rubs, or gallops  ABDOMEN: Soft, Nontender, Nondistended; Bowel sounds present  EXTREMITIES:  2+ Peripheral Pulses, No clubbing, cyanosis, or edema    NERVOUS SYSTEM:  Cranial Nerves 2-12 intact [ X ] Abnormal  [  ]  ROM: WFL all extremities [ X ]  Abnormal [  ]  Motor Strength: WFL all extremities  [  ]  Abnormal [ X]--pain limited  Sensation: intact to light touch [ X ] Abnormal [  ]  Reflexes: Symmetric [  ]  Abnormal [  ]    FUNCTIONAL STATUS:  Bed Mobility: Independent [  ]  Supervision [  ]  Needs Assistance [ X ]  N/A [  ]  Transfers: Independent [  ]  Supervision [  ]  Needs Assistance [ X ]  N/A [  ]   Ambulation: Independent [  ]  Supervision [  ]  Needs Assistance [ X ]  N/A [  ]  ADL: Independent [ X ] Requires Assistance [  ] N/A [  ]      LABS:      Urinalysis Basic - ( 2018 13:31 )    Color: Yellow / Appearance: Clear / S.010 / pH: x  Gluc: x / Ketone: Trace  / Bili: Negative / Urobili: 0.2 mg/dL   Blood: x / Protein: Negative mg/dL / Nitrite: Negative   Leuk Esterase: Trace / RBC: 1-2 /HPF / WBC 3-5 /HPF   Sq Epi: x / Non Sq Epi: x / Bacteria: x        RADIOLOGY & ADDITIONAL STUDIES:      EXAM:  CT LUMBAR SPINE            PROCEDURE DATE:  2018            INTERPRETATION:  CLINICAL HISTORY/REASON FOR EXAM: The patient is status   post kyphoplasty of T9, T12, L1, L2, and L3.    TECHNIQUE: Contiguous axial CT images were obtained from L1 through S4   without IV contrast. Sagittal and coronal reformats were submitted for   evaluation.    COMPARISON: Noncontrast MRI of the lumbar spine dated 2018.    FINDINGS:    There is motion artifact and the study is technically limited.    The patient is post kyphoplasty of L1, L2, and L3. There is iatrogenic   material in the anterior left side of the spinal canal and lateral to the   L1 vertebral body. There is iatrogenic material lateral to the left side   of the L2 vertebral body and lateral to the right side of the L3   vertebral body. There are mild compression deformities of L1, L2, and L3.    There is suggestion of an acute left L3 pars/lamina fracture, with small   osseous fragments within the left lateral aspect of the spinal canal   (series 6, image 65).    The bones are diffusely osteopenic. No spondylolisthesis. Severe L4-L5   intervertebral disc space narrowing.    Degenerative changes incompletely evaluated.    Impression:    1.  Technically Limited examination.    2.  Likely acute left L3 pars/lamina fracture with small osseous   fragments within the left lateral aspect of the canal.    3.  Status post kyphoplasty at L1, L2, and L3.     4.  Mild compression deformities of L1, L2, and L3.    5.  Diffuse osteopenia.        EXAM:  CT THORACIC SPINE            PROCEDURE DATE:  2018            INTERPRETATION:  Clinical History / Reason for exam: Pain, status post   kyphoplasty T9, T12, L1, L2, and L3 compression fractures.    CT SCAN OF THE THORACIC SPINE WITHOUT CONTRAST    TECHNIQUE:    Multiple transaxial noninfusion CT images of the thoracic spine were   obtained from T1 through T12.  Sagittal and coronal reformatted images   were performed as well.    FINDINGS:    Motion artifact limits the examination.    The patient is status post vertebroplasty at T9, T12, and L1.    At T9 there is iatrogenic material within the anterior aspect of the   spinal canal and anterior to the T9 vertebral body. There is a minimal   compression deformity involving the superior endplate of T9.    At T12 there is iatrogenic material lateral to the left side of the T12   vertebral body. Mild compression deformity involving the superior and   inferior endplates.    L1 is incompletely imaged on this examination. CT scan of the lumbar   spine was performed the same day, please see that report for further   information.    At L1 there is iatrogenic material within the anterior left side of the   spinal canal.    There is diffuse osteopenia.    There is a vacuum disc phenomenon at T12-L1.    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    There is a minimal compression deformity involving the superior endplate   of T11.    Vascular calcifications are noted.    IMPRESSION:    1.  Status post vertebral kyphoplasty at T9, T12, and L1.     2.  Compression deformities involving T9, T11, and T12.    3.  Diff        EXAM:  XR CHEST PA LAT 2V            PROCEDURE DATE:  2018            INTERPRETATION:  Clinical History / Reason for exam: Preop    Comparison : Chest radiograph 9/15/2011.    Technique/Positionin images.    Findings:    Support devices: None.    Cardiac/mediastinum/hilum: Unremarkable.    Lung parenchyma/Pleura: Within normal limits.    Skeleton/soft tissues: Compression fracture vertebral bodies exact level   and age indeterminant on this exam.    Impression:      No radiographic evidence of acute cardiopulmonary disease.

## 2018-04-09 NOTE — PHYSICAL THERAPY INITIAL EVALUATION ADULT - GENERAL OBSERVATIONS, REHAB EVAL
09:50-10:25 am; 12:20 - 12:30 pm; 12:00-12:30 pm. patient seen on 3 attempts due to HTN and pain. patient received in bed, left in reclined chair, + 2L O2, + PCA, + HHA at b/s. 77/F s/p NS, presenting with decline in function following multiple level kyphoplasty. NS cleared patient for OOB to chair. prefers to stay flat in bed and reclined in chair due to pain in the area of RT T10 level ribs.

## 2018-04-09 NOTE — CONSULT NOTE ADULT - ASSESSMENT
IMPRESSION: Rehab for thoracic and lumbar compression fractures s/p T9, T12, L1, L2, L3 kyphoplasty    PRECAUTIONS: [  ] Cardiac  [  ] Respiratory  [  ] Seizures [  ] Contact Isolation  [  ] Droplet Isolation  [  ] Other    Weight Bearing Status:  WBAT    RECOMMENDATION:    Out of Bed to Chair     DVT/Decubiti Prophylaxis    REHAB PLAN:     [ X  ] Bedside P/T 3-5 times a week   [   ]   Bedside O/T  2-3 times a week             [   ] No Rehab Therapy Indicated                   [   ]  Speech Therapy   Conditioning/ROM                                    ADL  Bed Mobility                                               Conditioning/ROM  Transfers                                                     Bed Mobility  Sitting /Standing Balance                         Transfers                                        Gait Training                                               Sitting/Standing Balance  Stair Training [ X ]Applicable                    Home equipment Eval                                                                        Splinting  [   ] Only      GOALS:   ADL   [   ]   Independent                  Transfers  [  X ] Independent                        Ambulation  [  X ] Independent     [ X  ] With device                            [   ]  CG                                                         [   ]  CG                                                                  [   ] CG                            [    ] Min A                                                   [   ] Min A                                                              [   ] Min  A          DISCHARGE PLAN:   [   ]  Good candidate for Intensive Rehabilitation/Hospital based-4A [facility]                                             Will tolerate 3hrs Intensive Rehab Daily                                       [  X  ]  Short Term Rehab in Skilled Nursing Facility                                       [  X  ]  Home with Outpatient or  services                                         [    ]  Possible Candidate for Intensive Hospital based Rehab      Thank you.

## 2018-04-10 RX ORDER — BACITRACIN ZINC 500 UNIT/G
1 OINTMENT IN PACKET (EA) TOPICAL
Qty: 0 | Refills: 0 | Status: DISCONTINUED | OUTPATIENT
Start: 2018-04-10 | End: 2018-04-18

## 2018-04-10 RX ORDER — PANTOPRAZOLE SODIUM 20 MG/1
40 TABLET, DELAYED RELEASE ORAL
Qty: 0 | Refills: 0 | Status: DISCONTINUED | OUTPATIENT
Start: 2018-04-10 | End: 2018-04-18

## 2018-04-10 RX ORDER — METOCLOPRAMIDE HCL 10 MG
10 TABLET ORAL EVERY 6 HOURS
Qty: 0 | Refills: 0 | Status: DISCONTINUED | OUTPATIENT
Start: 2018-04-10 | End: 2018-04-18

## 2018-04-10 RX ORDER — MORPHINE SULFATE 50 MG/1
30 CAPSULE, EXTENDED RELEASE ORAL ONCE
Qty: 0 | Refills: 0 | Status: DISCONTINUED | OUTPATIENT
Start: 2018-04-10 | End: 2018-04-10

## 2018-04-10 RX ORDER — DIPHENHYDRAMINE HCL 50 MG
25 CAPSULE ORAL EVERY 4 HOURS
Qty: 0 | Refills: 0 | Status: DISCONTINUED | OUTPATIENT
Start: 2018-04-10 | End: 2018-04-18

## 2018-04-10 RX ADMIN — METHOCARBAMOL 750 MILLIGRAM(S): 500 TABLET, FILM COATED ORAL at 22:23

## 2018-04-10 RX ADMIN — MORPHINE SULFATE 30 MILLIGRAM(S): 50 CAPSULE, EXTENDED RELEASE ORAL at 12:32

## 2018-04-10 RX ADMIN — FENTANYL CITRATE 1 PATCH: 50 INJECTION INTRAVENOUS at 11:42

## 2018-04-10 RX ADMIN — HEPARIN SODIUM 5000 UNIT(S): 5000 INJECTION INTRAVENOUS; SUBCUTANEOUS at 22:22

## 2018-04-10 RX ADMIN — METHOCARBAMOL 750 MILLIGRAM(S): 500 TABLET, FILM COATED ORAL at 17:45

## 2018-04-10 RX ADMIN — METHOCARBAMOL 750 MILLIGRAM(S): 500 TABLET, FILM COATED ORAL at 11:21

## 2018-04-10 RX ADMIN — FENTANYL CITRATE 1 PATCH: 50 INJECTION INTRAVENOUS at 11:32

## 2018-04-10 RX ADMIN — PANTOPRAZOLE SODIUM 40 MILLIGRAM(S): 20 TABLET, DELAYED RELEASE ORAL at 17:45

## 2018-04-10 RX ADMIN — MORPHINE SULFATE 30 MILLIGRAM(S): 50 CAPSULE, EXTENDED RELEASE ORAL at 11:25

## 2018-04-10 RX ADMIN — Medication 1 APPLICATION(S): at 17:46

## 2018-04-10 RX ADMIN — Medication 1 TABLET(S): at 17:45

## 2018-04-10 RX ADMIN — MORPHINE SULFATE 4 MILLIGRAM(S): 50 CAPSULE, EXTENDED RELEASE ORAL at 18:14

## 2018-04-10 RX ADMIN — MORPHINE SULFATE 4 MILLIGRAM(S): 50 CAPSULE, EXTENDED RELEASE ORAL at 17:53

## 2018-04-10 RX ADMIN — MORPHINE SULFATE 15 MILLIGRAM(S): 50 CAPSULE, EXTENDED RELEASE ORAL at 16:50

## 2018-04-10 RX ADMIN — Medication 100 MILLIGRAM(S): at 22:22

## 2018-04-10 RX ADMIN — Medication 25 MILLIGRAM(S): at 17:44

## 2018-04-10 RX ADMIN — CARBIDOPA AND LEVODOPA 1 TABLET(S): 25; 100 TABLET ORAL at 22:25

## 2018-04-10 RX ADMIN — MORPHINE SULFATE 4 MILLIGRAM(S): 50 CAPSULE, EXTENDED RELEASE ORAL at 14:11

## 2018-04-10 RX ADMIN — Medication 10 MILLIGRAM(S): at 22:24

## 2018-04-10 RX ADMIN — Medication 650 MILLIGRAM(S): at 14:48

## 2018-04-10 RX ADMIN — CARBIDOPA AND LEVODOPA 1 TABLET(S): 25; 100 TABLET ORAL at 17:59

## 2018-04-10 RX ADMIN — HEPARIN SODIUM 5000 UNIT(S): 5000 INJECTION INTRAVENOUS; SUBCUTANEOUS at 13:47

## 2018-04-10 RX ADMIN — Medication 10 MILLIGRAM(S): at 17:43

## 2018-04-10 RX ADMIN — CARBIDOPA AND LEVODOPA 1 TABLET(S): 25; 100 TABLET ORAL at 11:20

## 2018-04-10 RX ADMIN — SENNA PLUS 2 TABLET(S): 8.6 TABLET ORAL at 22:24

## 2018-04-10 RX ADMIN — MORPHINE SULFATE 15 MILLIGRAM(S): 50 CAPSULE, EXTENDED RELEASE ORAL at 16:31

## 2018-04-10 RX ADMIN — MONTELUKAST 10 MILLIGRAM(S): 4 TABLET, CHEWABLE ORAL at 11:26

## 2018-04-10 RX ADMIN — MORPHINE SULFATE 4 MILLIGRAM(S): 50 CAPSULE, EXTENDED RELEASE ORAL at 13:47

## 2018-04-10 RX ADMIN — MORPHINE SULFATE 30 MILLIGRAM(S): 50 CAPSULE, EXTENDED RELEASE ORAL at 22:24

## 2018-04-10 NOTE — CONSULT NOTE ADULT - ASSESSMENT
A/P  HPI:  77 y.o. woman with PMH as below admitted 4/6/2018 with osteoporosis and thoracic and lumbar compression fractures. Dr. Garcia performed T9, T12, L1, L2, and L3  medical consult called for Abdominal pain and nausea, vomiting    1- Nausea, vomiting, Abdominal pain  r/o Gastritis  start protonix 40 mg BID  Get RUQ ultrasound  get basic labs including LFts    2- Compression fractures s/p Kypjhoplasty  continue Pain controll  NS follow up A/P  HPI:  77 y.o. woman with PMH as below admitted 4/6/2018 with osteoporosis and thoracic and lumbar compression fractures. Dr. Garcia performed T9, T12, L1, L2, and L3  medical consult called for Abdominal pain and nausea, vomiting    1- Nausea, vomiting, Abdominal pain  r/o Gastritis  start protonix 40 mg BID  Get RUQ ultrasound  get basic labs including LFts    2- Compression fractures s/p Kypjhoplasty  continue Pain controll  NS follow up    3- Hypothyroidism  continue synthroid    4- Barb  Continue with CPAP at night    Plan discussed with Neurosurgery PA

## 2018-04-10 NOTE — CONSULT NOTE ADULT - SUBJECTIVE AND OBJECTIVE BOX
HPI:  HPI:  77 y.o. woman with PMH as below admitted 4/6/2018 with osteoporosis and thoracic and lumbar compression fractures.   Dr. Gacria performed T9, T12, L1, L2, and L3 kyphoplasties  Medical consult called for Abdominal pain and Nausea, vomiting  denies chest pain or shortness of breath    PAST MEDICAL & SURGICAL HISTORY:  PAST MEDICAL & SURGICAL HISTORY:  Seasonal allergies  DWAYNE on CPAP  Obesity: s/p gastric bypass ~15 yrs ago ~100 lb loss  Osteoarthritis  Rheumatoid arthritis  Fibromyalgia  Tremors of nervous system: essential tremors  Psoriatic arthritis  GERD (gastroesophageal reflux disease)  Hypothyroidism: no recent dosage changes  Ankle deformity: s/p b/l surgical repair  H/O gastric bypass      REVIEW OF SYSTEMS  REVIEW OF SYSTEMS      General:	 Lying in bed in no acute distress    Skin/Breast:  no redness and rash  	  Ophthalmologic:  no eye pain, or blurred vision  	  ENMT:	no nasal pain, or discharge    Respiratory and Thorax:   no cough, no sob, no phlegm  	  Cardiovascular:	no chest pain, sob    Gastrointestinal:	 Nausea, vomiting, Abdominal pain RUQ    Genitourinary:	no hematuria, no dysuria or burning in urine    Musculoskeletal: 	no muscle pain    Neurological:	 no headche, tingling or numbness    Psychiatric:	no depression, anxiety    Hematology/Lymphatics:	no lymphnodes    Endocrine:	no heat or cold intolerance    Allergic/Immunologic:	 no rash    MEDICATIONS:  MEDICATIONS  (STANDING):  acetaminophen  IVPB. 1000 milliGRAM(s) IV Intermittent once  BACItracin   Ointment 1 Application(s) Topical two times a day  carbidopa/levodopa  10/100 1 Tablet(s) Oral three times a day  docusate sodium 100 milliGRAM(s) Oral three times a day  fentaNYL   Patch  12 MICROgram(s)/Hr 1 Patch Transdermal every 72 hours  heparin  Injectable 5000 Unit(s) SubCutaneous every 8 hours  levothyroxine 75 MICROGram(s) Oral daily  methocarbamol 750 milliGRAM(s) Oral every 6 hours  metoclopramide 10 milliGRAM(s) Oral every 6 hours  montelukast 10 milliGRAM(s) Oral daily  morphine ER Tablet 30 milliGRAM(s) Oral two times a day  pantoprazole    Tablet 40 milliGRAM(s) Oral two times a day  predniSONE   Tablet 10 milliGRAM(s) Oral daily  senna 2 Tablet(s) Oral at bedtime  trimethoprim  160 mG/sulfamethoxazole 800 mG 1 Tablet(s) Oral two times a day    MEDICATIONS  (PRN):  acetaminophen   Tablet 650 milliGRAM(s) Oral every 4 hours PRN For Temp greater than 38 C (100.4 F)  diphenhydrAMINE   Capsule 25 milliGRAM(s) Oral every 4 hours PRN Rash and/or Itching  morphine  - Injectable 4 milliGRAM(s) IV Push every 3 hours PRN breakthrough pain  morphine ER Tablet 15 milliGRAM(s) Oral <User Schedule> PRN break through pain  ondansetron Injectable 4 milliGRAM(s) IV Push every 6 hours PRN Nausea      ALLERGIES:  Allergies    adhesives (Pruritus; Rash)  Betadine (Flushing (Skin); Pruritus)  NSAIDs (Vomiting; Rash; Nausea)    Intolerances        SOCIAL HISTORY:  non smoker, no alcohol    FAMILY HISTORY:  FAMILY HISTORY:  CVA in the family      VITALS:  T(F): 98.2, Max: 98.4 (04-10-18 @ 07:56)  HR: 87  BP: 144/62  RR: 19  SpO2: --    IN AND OUT        PHYSICAL EXAM:  PHYSICAL EXAM:      Constitutional:  fatigued    Eyes:  SALVADOR    ENMT: No discharge    Neck:  SUPPLE    Respiratory:  clear bilateral    Cardiovascular:  regular rhythm, no murmur    Gastrointestinal:  RUQ tenderness positive    Extremities:  no edema    Neurological: Alert awake oriented    Lymph Nodes:  no lymph nodes          LABS:    No new labs            RADIOLOGY & ADDITIONAL STUDIES: HPI:  HPI:  77 y.o. woman with PMH as below admitted 4/6/2018 with osteoporosis and thoracic and lumbar compression fractures.   Dr. Garcia performed T9, T12, L1, L2, and L3 kyphoplasties  Medical consult called for Abdominal pain and Nausea, vomiting  denies chest pain or shortness of breath    PAST MEDICAL & SURGICAL HISTORY:  PAST MEDICAL & SURGICAL HISTORY:  Seasonal allergies  DWAYNE on CPAP  Obesity: s/p gastric bypass ~15 yrs ago ~100 lb loss  Osteoarthritis  Rheumatoid arthritis  Fibromyalgia  Tremors of nervous system: essential tremors  Psoriatic arthritis  GERD (gastroesophageal reflux disease)  Hypothyroidism: no recent dosage changes  Ankle deformity: s/p b/l surgical repair  H/O gastric bypass      REVIEW OF SYSTEMS  REVIEW OF SYSTEMS      General:	 Lying in bed in no acute distress    Skin/Breast:  no redness and rash  	  Ophthalmologic:  no eye pain, or blurred vision  	  ENMT:	no nasal pain, or discharge    Respiratory and Thorax:   no cough, no sob, no phlegm  	  Cardiovascular:	no chest pain, sob    Gastrointestinal:	 Nausea, vomiting, Abdominal pain RUQ    Genitourinary:	no hematuria, no dysuria or burning in urine    Musculoskeletal: 	back pain    Neurological:	 no headche, tingling or numbness    Psychiatric:	no depression, anxiety    Hematology/Lymphatics:	no lymphnodes    Endocrine:	no heat or cold intolerance    Allergic/Immunologic:	 no rash    MEDICATIONS:  MEDICATIONS  (STANDING):  acetaminophen  IVPB. 1000 milliGRAM(s) IV Intermittent once  BACItracin   Ointment 1 Application(s) Topical two times a day  carbidopa/levodopa  10/100 1 Tablet(s) Oral three times a day  docusate sodium 100 milliGRAM(s) Oral three times a day  fentaNYL   Patch  12 MICROgram(s)/Hr 1 Patch Transdermal every 72 hours  heparin  Injectable 5000 Unit(s) SubCutaneous every 8 hours  levothyroxine 75 MICROGram(s) Oral daily  methocarbamol 750 milliGRAM(s) Oral every 6 hours  metoclopramide 10 milliGRAM(s) Oral every 6 hours  montelukast 10 milliGRAM(s) Oral daily  morphine ER Tablet 30 milliGRAM(s) Oral two times a day  pantoprazole    Tablet 40 milliGRAM(s) Oral two times a day  predniSONE   Tablet 10 milliGRAM(s) Oral daily  senna 2 Tablet(s) Oral at bedtime  trimethoprim  160 mG/sulfamethoxazole 800 mG 1 Tablet(s) Oral two times a day    MEDICATIONS  (PRN):  acetaminophen   Tablet 650 milliGRAM(s) Oral every 4 hours PRN For Temp greater than 38 C (100.4 F)  diphenhydrAMINE   Capsule 25 milliGRAM(s) Oral every 4 hours PRN Rash and/or Itching  morphine  - Injectable 4 milliGRAM(s) IV Push every 3 hours PRN breakthrough pain  morphine ER Tablet 15 milliGRAM(s) Oral <User Schedule> PRN break through pain  ondansetron Injectable 4 milliGRAM(s) IV Push every 6 hours PRN Nausea      ALLERGIES:  Allergies    adhesives (Pruritus; Rash)  Betadine (Flushing (Skin); Pruritus)  NSAIDs (Vomiting; Rash; Nausea)    Intolerances        SOCIAL HISTORY:  non smoker, no alcohol    FAMILY HISTORY:  FAMILY HISTORY:  CVA in the family      VITALS:  T(F): 98.2, Max: 98.4 (04-10-18 @ 07:56)  HR: 87  BP: 144/62  RR: 19  SpO2: --    IN AND OUT        PHYSICAL EXAM:  PHYSICAL EXAM:      Constitutional:  fatigued    Eyes:  SALVADOR    ENMT: No discharge    Neck:  SUPPLE    Respiratory:  clear bilateral    Cardiovascular:  regular rhythm, no murmur    Gastrointestinal:  RUQ tenderness positive    Extremities:  no edema    Neurological: Alert awake oriented    Lymph Nodes:  no lymph nodes          LABS:    No new labs            RADIOLOGY & ADDITIONAL STUDIES:

## 2018-04-11 LAB
ALBUMIN SERPL ELPH-MCNC: 3.5 G/DL — SIGNIFICANT CHANGE UP (ref 3.5–5.2)
ALP SERPL-CCNC: 86 U/L — SIGNIFICANT CHANGE UP (ref 30–115)
ALT FLD-CCNC: 8 U/L — SIGNIFICANT CHANGE UP (ref 0–41)
ANION GAP SERPL CALC-SCNC: 12 MMOL/L — SIGNIFICANT CHANGE UP (ref 7–14)
AST SERPL-CCNC: 35 U/L — SIGNIFICANT CHANGE UP (ref 0–41)
BILIRUB DIRECT SERPL-MCNC: 0.3 MG/DL — HIGH (ref 0–0.2)
BILIRUB INDIRECT FLD-MCNC: 0.5 MG/DL — SIGNIFICANT CHANGE UP (ref 0.2–1.2)
BILIRUB SERPL-MCNC: 0.8 MG/DL — SIGNIFICANT CHANGE UP (ref 0.2–1.2)
BUN SERPL-MCNC: 6 MG/DL — LOW (ref 10–20)
CALCIUM SERPL-MCNC: 6.6 MG/DL — LOW (ref 8.5–10.1)
CHLORIDE SERPL-SCNC: 103 MMOL/L — SIGNIFICANT CHANGE UP (ref 98–110)
CO2 SERPL-SCNC: 24 MMOL/L — SIGNIFICANT CHANGE UP (ref 17–32)
CREAT SERPL-MCNC: 0.8 MG/DL — SIGNIFICANT CHANGE UP (ref 0.7–1.5)
GLUCOSE SERPL-MCNC: 118 MG/DL — HIGH (ref 70–99)
HCT VFR BLD CALC: 30.8 % — LOW (ref 37–47)
HGB BLD-MCNC: 10.1 G/DL — LOW (ref 12–16)
MCHC RBC-ENTMCNC: 32.7 PG — HIGH (ref 27–31)
MCHC RBC-ENTMCNC: 32.8 G/DL — SIGNIFICANT CHANGE UP (ref 32–37)
MCV RBC AUTO: 99.7 FL — HIGH (ref 81–99)
NRBC # BLD: 0 /100 WBCS — SIGNIFICANT CHANGE UP (ref 0–0)
PLATELET # BLD AUTO: 152 K/UL — SIGNIFICANT CHANGE UP (ref 130–400)
POTASSIUM SERPL-MCNC: 4 MMOL/L — SIGNIFICANT CHANGE UP (ref 3.5–5)
POTASSIUM SERPL-SCNC: 4 MMOL/L — SIGNIFICANT CHANGE UP (ref 3.5–5)
PROT SERPL-MCNC: 5.5 G/DL — LOW (ref 6–8)
RBC # BLD: 3.09 M/UL — LOW (ref 4.2–5.4)
RBC # FLD: 18.7 % — HIGH (ref 11.5–14.5)
SODIUM SERPL-SCNC: 139 MMOL/L — SIGNIFICANT CHANGE UP (ref 135–146)
WBC # BLD: 7.5 K/UL — SIGNIFICANT CHANGE UP (ref 4.8–10.8)
WBC # FLD AUTO: 7.5 K/UL — SIGNIFICANT CHANGE UP (ref 4.8–10.8)

## 2018-04-11 RX ADMIN — Medication 10 MILLIGRAM(S): at 12:19

## 2018-04-11 RX ADMIN — HEPARIN SODIUM 5000 UNIT(S): 5000 INJECTION INTRAVENOUS; SUBCUTANEOUS at 05:49

## 2018-04-11 RX ADMIN — Medication 1 APPLICATION(S): at 19:11

## 2018-04-11 RX ADMIN — Medication 10 MILLIGRAM(S): at 21:43

## 2018-04-11 RX ADMIN — METHOCARBAMOL 750 MILLIGRAM(S): 500 TABLET, FILM COATED ORAL at 12:19

## 2018-04-11 RX ADMIN — CARBIDOPA AND LEVODOPA 1 TABLET(S): 25; 100 TABLET ORAL at 05:48

## 2018-04-11 RX ADMIN — METHOCARBAMOL 750 MILLIGRAM(S): 500 TABLET, FILM COATED ORAL at 19:11

## 2018-04-11 RX ADMIN — MORPHINE SULFATE 30 MILLIGRAM(S): 50 CAPSULE, EXTENDED RELEASE ORAL at 19:10

## 2018-04-11 RX ADMIN — METHOCARBAMOL 750 MILLIGRAM(S): 500 TABLET, FILM COATED ORAL at 21:42

## 2018-04-11 RX ADMIN — PANTOPRAZOLE SODIUM 40 MILLIGRAM(S): 20 TABLET, DELAYED RELEASE ORAL at 19:10

## 2018-04-11 RX ADMIN — PANTOPRAZOLE SODIUM 40 MILLIGRAM(S): 20 TABLET, DELAYED RELEASE ORAL at 05:50

## 2018-04-11 RX ADMIN — Medication 10 MILLIGRAM(S): at 19:10

## 2018-04-11 RX ADMIN — METHOCARBAMOL 750 MILLIGRAM(S): 500 TABLET, FILM COATED ORAL at 05:49

## 2018-04-11 RX ADMIN — HEPARIN SODIUM 5000 UNIT(S): 5000 INJECTION INTRAVENOUS; SUBCUTANEOUS at 13:25

## 2018-04-11 RX ADMIN — MORPHINE SULFATE 30 MILLIGRAM(S): 50 CAPSULE, EXTENDED RELEASE ORAL at 05:49

## 2018-04-11 RX ADMIN — Medication 100 MILLIGRAM(S): at 13:25

## 2018-04-11 RX ADMIN — Medication 650 MILLIGRAM(S): at 23:51

## 2018-04-11 RX ADMIN — Medication 1 APPLICATION(S): at 05:48

## 2018-04-11 RX ADMIN — Medication 10 MILLIGRAM(S): at 05:50

## 2018-04-11 RX ADMIN — MONTELUKAST 10 MILLIGRAM(S): 4 TABLET, CHEWABLE ORAL at 12:19

## 2018-04-11 RX ADMIN — CARBIDOPA AND LEVODOPA 1 TABLET(S): 25; 100 TABLET ORAL at 21:42

## 2018-04-11 RX ADMIN — Medication 1 TABLET(S): at 05:50

## 2018-04-11 RX ADMIN — HEPARIN SODIUM 5000 UNIT(S): 5000 INJECTION INTRAVENOUS; SUBCUTANEOUS at 21:42

## 2018-04-11 RX ADMIN — Medication 10 MILLIGRAM(S): at 05:49

## 2018-04-11 RX ADMIN — CARBIDOPA AND LEVODOPA 1 TABLET(S): 25; 100 TABLET ORAL at 13:25

## 2018-04-11 RX ADMIN — METHOCARBAMOL 750 MILLIGRAM(S): 500 TABLET, FILM COATED ORAL at 13:18

## 2018-04-11 RX ADMIN — Medication 75 MICROGRAM(S): at 05:49

## 2018-04-11 RX ADMIN — MORPHINE SULFATE 4 MILLIGRAM(S): 50 CAPSULE, EXTENDED RELEASE ORAL at 21:51

## 2018-04-11 NOTE — DIETITIAN INITIAL EVALUATION ADULT. - ORAL INTAKE PTA
pt. reports she's not a big eater, prefers to have smaller meals but more frequently, MVI daily, no supplement use/fair

## 2018-04-11 NOTE — DIETITIAN INITIAL EVALUATION ADULT. - OTHER INFO
Initial assessment for LOS: S/P T9, 12 L1-3 Kyphoplasty, POD #5, PT/rehab. Pt. reports decreased appetite- willing to try chocolate Ensure compact (prefers a smaller volume supplement)

## 2018-04-12 RX ORDER — OXYCODONE AND ACETAMINOPHEN 5; 325 MG/1; MG/1
1 TABLET ORAL EVERY 4 HOURS
Qty: 0 | Refills: 0 | Status: DISCONTINUED | OUTPATIENT
Start: 2018-04-12 | End: 2018-04-18

## 2018-04-12 RX ADMIN — MORPHINE SULFATE 30 MILLIGRAM(S): 50 CAPSULE, EXTENDED RELEASE ORAL at 05:33

## 2018-04-12 RX ADMIN — Medication 100 MILLIGRAM(S): at 14:11

## 2018-04-12 RX ADMIN — PANTOPRAZOLE SODIUM 40 MILLIGRAM(S): 20 TABLET, DELAYED RELEASE ORAL at 17:28

## 2018-04-12 RX ADMIN — MONTELUKAST 10 MILLIGRAM(S): 4 TABLET, CHEWABLE ORAL at 12:24

## 2018-04-12 RX ADMIN — Medication 75 MICROGRAM(S): at 05:35

## 2018-04-12 RX ADMIN — Medication 100 MILLIGRAM(S): at 21:25

## 2018-04-12 RX ADMIN — MORPHINE SULFATE 30 MILLIGRAM(S): 50 CAPSULE, EXTENDED RELEASE ORAL at 17:28

## 2018-04-12 RX ADMIN — OXYCODONE AND ACETAMINOPHEN 1 TABLET(S): 5; 325 TABLET ORAL at 23:37

## 2018-04-12 RX ADMIN — HEPARIN SODIUM 5000 UNIT(S): 5000 INJECTION INTRAVENOUS; SUBCUTANEOUS at 14:11

## 2018-04-12 RX ADMIN — HEPARIN SODIUM 5000 UNIT(S): 5000 INJECTION INTRAVENOUS; SUBCUTANEOUS at 05:35

## 2018-04-12 RX ADMIN — CARBIDOPA AND LEVODOPA 1 TABLET(S): 25; 100 TABLET ORAL at 21:25

## 2018-04-12 RX ADMIN — Medication 10 MILLIGRAM(S): at 12:24

## 2018-04-12 RX ADMIN — Medication 1 APPLICATION(S): at 05:34

## 2018-04-12 RX ADMIN — METHOCARBAMOL 750 MILLIGRAM(S): 500 TABLET, FILM COATED ORAL at 17:28

## 2018-04-12 RX ADMIN — Medication 650 MILLIGRAM(S): at 21:26

## 2018-04-12 RX ADMIN — Medication 100 MILLIGRAM(S): at 05:35

## 2018-04-12 RX ADMIN — METHOCARBAMOL 750 MILLIGRAM(S): 500 TABLET, FILM COATED ORAL at 23:12

## 2018-04-12 RX ADMIN — Medication 1 APPLICATION(S): at 19:36

## 2018-04-12 RX ADMIN — CARBIDOPA AND LEVODOPA 1 TABLET(S): 25; 100 TABLET ORAL at 05:34

## 2018-04-12 RX ADMIN — Medication 10 MILLIGRAM(S): at 17:28

## 2018-04-12 RX ADMIN — METHOCARBAMOL 750 MILLIGRAM(S): 500 TABLET, FILM COATED ORAL at 12:24

## 2018-04-12 RX ADMIN — Medication 10 MILLIGRAM(S): at 05:33

## 2018-04-12 RX ADMIN — Medication 10 MILLIGRAM(S): at 05:34

## 2018-04-12 RX ADMIN — PANTOPRAZOLE SODIUM 40 MILLIGRAM(S): 20 TABLET, DELAYED RELEASE ORAL at 05:34

## 2018-04-12 RX ADMIN — HEPARIN SODIUM 5000 UNIT(S): 5000 INJECTION INTRAVENOUS; SUBCUTANEOUS at 21:26

## 2018-04-12 RX ADMIN — SENNA PLUS 2 TABLET(S): 8.6 TABLET ORAL at 23:29

## 2018-04-12 RX ADMIN — METHOCARBAMOL 750 MILLIGRAM(S): 500 TABLET, FILM COATED ORAL at 05:33

## 2018-04-12 RX ADMIN — CARBIDOPA AND LEVODOPA 1 TABLET(S): 25; 100 TABLET ORAL at 14:10

## 2018-04-12 RX ADMIN — Medication 10 MILLIGRAM(S): at 23:12

## 2018-04-13 RX ADMIN — Medication 10 MILLIGRAM(S): at 06:03

## 2018-04-13 RX ADMIN — CARBIDOPA AND LEVODOPA 1 TABLET(S): 25; 100 TABLET ORAL at 06:04

## 2018-04-13 RX ADMIN — CARBIDOPA AND LEVODOPA 1 TABLET(S): 25; 100 TABLET ORAL at 21:31

## 2018-04-13 RX ADMIN — METHOCARBAMOL 750 MILLIGRAM(S): 500 TABLET, FILM COATED ORAL at 06:04

## 2018-04-13 RX ADMIN — Medication 100 MILLIGRAM(S): at 06:04

## 2018-04-13 RX ADMIN — HEPARIN SODIUM 5000 UNIT(S): 5000 INJECTION INTRAVENOUS; SUBCUTANEOUS at 14:33

## 2018-04-13 RX ADMIN — Medication 10 MILLIGRAM(S): at 12:08

## 2018-04-13 RX ADMIN — MONTELUKAST 10 MILLIGRAM(S): 4 TABLET, CHEWABLE ORAL at 12:08

## 2018-04-13 RX ADMIN — OXYCODONE AND ACETAMINOPHEN 1 TABLET(S): 5; 325 TABLET ORAL at 09:04

## 2018-04-13 RX ADMIN — Medication 1 APPLICATION(S): at 06:04

## 2018-04-13 RX ADMIN — MORPHINE SULFATE 30 MILLIGRAM(S): 50 CAPSULE, EXTENDED RELEASE ORAL at 06:03

## 2018-04-13 RX ADMIN — PANTOPRAZOLE SODIUM 40 MILLIGRAM(S): 20 TABLET, DELAYED RELEASE ORAL at 18:33

## 2018-04-13 RX ADMIN — Medication 1 APPLICATION(S): at 18:32

## 2018-04-13 RX ADMIN — Medication 100 MILLIGRAM(S): at 21:32

## 2018-04-13 RX ADMIN — METHOCARBAMOL 750 MILLIGRAM(S): 500 TABLET, FILM COATED ORAL at 18:33

## 2018-04-13 RX ADMIN — SENNA PLUS 2 TABLET(S): 8.6 TABLET ORAL at 21:33

## 2018-04-13 RX ADMIN — Medication 10 MILLIGRAM(S): at 18:33

## 2018-04-13 RX ADMIN — MORPHINE SULFATE 30 MILLIGRAM(S): 50 CAPSULE, EXTENDED RELEASE ORAL at 18:32

## 2018-04-13 RX ADMIN — CARBIDOPA AND LEVODOPA 1 TABLET(S): 25; 100 TABLET ORAL at 14:33

## 2018-04-13 RX ADMIN — Medication 75 MICROGRAM(S): at 06:05

## 2018-04-13 RX ADMIN — HEPARIN SODIUM 5000 UNIT(S): 5000 INJECTION INTRAVENOUS; SUBCUTANEOUS at 06:04

## 2018-04-13 RX ADMIN — HEPARIN SODIUM 5000 UNIT(S): 5000 INJECTION INTRAVENOUS; SUBCUTANEOUS at 21:32

## 2018-04-13 RX ADMIN — FENTANYL CITRATE 1 PATCH: 50 INJECTION INTRAVENOUS at 12:57

## 2018-04-13 RX ADMIN — OXYCODONE AND ACETAMINOPHEN 1 TABLET(S): 5; 325 TABLET ORAL at 09:34

## 2018-04-13 RX ADMIN — PANTOPRAZOLE SODIUM 40 MILLIGRAM(S): 20 TABLET, DELAYED RELEASE ORAL at 06:05

## 2018-04-13 RX ADMIN — Medication 100 MILLIGRAM(S): at 14:37

## 2018-04-13 RX ADMIN — METHOCARBAMOL 750 MILLIGRAM(S): 500 TABLET, FILM COATED ORAL at 12:09

## 2018-04-13 RX ADMIN — Medication 10 MILLIGRAM(S): at 06:05

## 2018-04-13 NOTE — CONSULT NOTE ADULT - SUBJECTIVE AND OBJECTIVE BOX
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  Ct chest no completed  Pts daughter at bedside      PMH/PSH:  PAST MEDICAL & SURGICAL HISTORY:  Seasonal allergies  DWAYNE on CPAP  Obesity: s/p gastric bypass ~15 yrs ago ~100 lb loss  Osteoarthritis  Rheumatoid arthritis  Fibromyalgia  Tremors of nervous system: essential tremors  Psoriatic arthritis  GERD (gastroesophageal reflux disease)  Hypothyroidism: no recent dosage changes  Ankle deformity: s/p b/l surgical repair  H/O gastric bypass    ALLERGIES:  Allergies    adhesives (Pruritus; Rash)  Betadine (Flushing (Skin); Pruritus)  NSAIDs (Vomiting; Rash; Nausea)    Intolerances      SOCIAL HABITS:  neg x 3    FAMILY HISTORY:   FAMILY HISTORY:  n/c    REVIEW OF SYSTEM:  Elements of review of systems are negative or non-applicable except as noted above in HPI section.   All other 12 point ros negative        HOME MEDICATIONS:  Actemra 20 mg/mL intravenous solution  carbidopa-levodopa 10 mg-100 mg oral tablet  fentaNYL 12 mcg/hr transdermal film, extended release  levothyroxine 75 mcg (0.075 mg) oral tablet  methotrexate 10 mg oral tablet  montelukast 10 mg oral tablet  morphine 15 mg oral tablet  morphine 30 mg oral capsule, extended release  predniSONE 10 mg oral tablet  Savella 25 mg oral tablet  ZyrTEC 10 mg oral tablet    MEDICATIONS:  MEDICATIONS  (STANDING):  acetaminophen  IVPB. 1000 milliGRAM(s) IV Intermittent once  BACItracin   Ointment 1 Application(s) Topical two times a day  carbidopa/levodopa  10/100 1 Tablet(s) Oral three times a day  docusate sodium 100 milliGRAM(s) Oral three times a day  heparin  Injectable 5000 Unit(s) SubCutaneous every 8 hours  levothyroxine 75 MICROGram(s) Oral daily  methocarbamol 750 milliGRAM(s) Oral every 6 hours  metoclopramide 10 milliGRAM(s) Oral every 6 hours  montelukast 10 milliGRAM(s) Oral daily  morphine ER Tablet 30 milliGRAM(s) Oral two times a day  pantoprazole    Tablet 40 milliGRAM(s) Oral two times a day  predniSONE   Tablet 10 milliGRAM(s) Oral daily  senna 2 Tablet(s) Oral at bedtime  trimethoprim  160 mG/sulfamethoxazole 800 mG 1 Tablet(s) Oral two times a day    MEDICATIONS  (PRN):  acetaminophen   Tablet 650 milliGRAM(s) Oral every 4 hours PRN For Temp greater than 38 C (100.4 F)  diphenhydrAMINE   Capsule 25 milliGRAM(s) Oral every 4 hours PRN Rash and/or Itching  morphine ER Tablet 15 milliGRAM(s) Oral <User Schedule> PRN break through pain  ondansetron Injectable 4 milliGRAM(s) IV Push every 6 hours PRN Nausea  oxyCODONE    5 mG/acetaminophen 325 mG 1 Tablet(s) Oral every 4 hours PRN Severe Pain (7 - 10)        VITALS:   Vital Signs Last 24 Hrs  T(C): 36.4 (13 Apr 2018 15:54), Max: 36.8 (13 Apr 2018 00:18)  T(F): 97.6 (13 Apr 2018 15:54), Max: 98.2 (13 Apr 2018 00:18)  HR: 80 (13 Apr 2018 15:54) (79 - 89)  BP: 130/67 (13 Apr 2018 15:54) (119/68 - 130/67)  BP(mean): --  RR: 18 (13 Apr 2018 07:48) (18 - 18)  SpO2: --  < from: Xray Chest 1 View AP/PA (04.10.18 @ 14:05) >  EXAM:  XR CHEST FRONTAL 1V          *** ADDENDUM 04/11/2018  ***    Correction: Upon additional clinical information, patient has no history   of right lung surgery with branching linear radiopacity at right lung   field consistent with sequelae ofkyphoplasty.       *** END OF ADDENDUM 04/11/2018  ***        PROCEDURE DATE:  04/10/2018            INTERPRETATION:  Clinical History / Reason for exam: Chest pain following   surgery    Comparison : Chest radiograph 3/23/2018.    Technique/Positioning: Satisfactory.    Findings:    Support devices: None.    Cardiac/mediastinum/hilum: Unchanged.    Lung parenchyma/Pleura: Left base opacity with volume loss consistent   with atelectasis.    Skeleton/soft tissues: There are surgical clips overlying the right   midlung field compatible with prior surgery. Partially imaged mid and   lower thoracic and kyphoplasty present with left upper quadrant surgical   clips.     Impression: No radiographic evidence of displaced rib fracture or   pneumothorax    Status post right lung surgery with surgical clips at right upper to   midlung field     < end of copied text >        PHYSICAL EXAM:    GENERAL: NAD, well-developed  HEAD:  Atraumatic, Normocephalic  NECK: Supple, No JVD  CHEST/LUNG: Clear to auscultation bilaterally; No wheeze  HEART: Regular rate and rhythm; No murmurs, rubs, or gallops  ABDOMEN: Soft, Nontender, Nondistended; Bowel sounds present  EXTREMITIES:  Good peripheral Pulses, No clubbing, cyanosis, or edema      LABS:                          ABG & VENT SETTINGS (when applicable)        DIAGNOSTIC STUDIES:

## 2018-04-13 NOTE — CONSULT NOTE ADULT - ASSESSMENT
Possible pulmonary cement embolization  Recent kyphoplasty  Atelectasis  Musculoskeltal/reproducible chest wall pain    obtain ct chest with contrast  obtain 2 decho  incentive spirometry  analgesics prn  encourage niv use-non complaint  dvt px  d/w pts daughter at length  pt without shortness of breath, hypoxia or chest pain other than reproducible musculoskeltal pain, as this is the case, pt would not benefit from therapeutic  antithrombotics but will review ct chest for further recommendations  pt reahb  d/w neurosx pa  d/w radiology

## 2018-04-14 LAB
APPEARANCE UR: CLEAR — SIGNIFICANT CHANGE UP
BILIRUB UR-MCNC: NEGATIVE — SIGNIFICANT CHANGE UP
COLOR SPEC: YELLOW — SIGNIFICANT CHANGE UP
DIFF PNL FLD: NEGATIVE — SIGNIFICANT CHANGE UP
GLUCOSE UR QL: NEGATIVE MG/DL — SIGNIFICANT CHANGE UP
KETONES UR-MCNC: NEGATIVE — SIGNIFICANT CHANGE UP
LEUKOCYTE ESTERASE UR-ACNC: (no result)
NITRITE UR-MCNC: NEGATIVE — SIGNIFICANT CHANGE UP
PH UR: 6.5 — SIGNIFICANT CHANGE UP (ref 5–8)
PROT UR-MCNC: NEGATIVE MG/DL — SIGNIFICANT CHANGE UP
SP GR SPEC: 1.01 — SIGNIFICANT CHANGE UP (ref 1.01–1.03)
UROBILINOGEN FLD QL: 1 MG/DL (ref 0.2–0.2)

## 2018-04-14 RX ORDER — LIDOCAINE 4 G/100G
1 CREAM TOPICAL DAILY
Qty: 0 | Refills: 0 | Status: DISCONTINUED | OUTPATIENT
Start: 2018-04-14 | End: 2018-04-17

## 2018-04-14 RX ADMIN — OXYCODONE AND ACETAMINOPHEN 1 TABLET(S): 5; 325 TABLET ORAL at 00:00

## 2018-04-14 RX ADMIN — Medication 10 MILLIGRAM(S): at 12:14

## 2018-04-14 RX ADMIN — MONTELUKAST 10 MILLIGRAM(S): 4 TABLET, CHEWABLE ORAL at 12:17

## 2018-04-14 RX ADMIN — METHOCARBAMOL 750 MILLIGRAM(S): 500 TABLET, FILM COATED ORAL at 12:13

## 2018-04-14 RX ADMIN — OXYCODONE AND ACETAMINOPHEN 1 TABLET(S): 5; 325 TABLET ORAL at 14:28

## 2018-04-14 RX ADMIN — MORPHINE SULFATE 15 MILLIGRAM(S): 50 CAPSULE, EXTENDED RELEASE ORAL at 11:00

## 2018-04-14 RX ADMIN — LIDOCAINE 1 PATCH: 4 CREAM TOPICAL at 23:24

## 2018-04-14 RX ADMIN — PANTOPRAZOLE SODIUM 40 MILLIGRAM(S): 20 TABLET, DELAYED RELEASE ORAL at 05:25

## 2018-04-14 RX ADMIN — Medication 1 APPLICATION(S): at 18:27

## 2018-04-14 RX ADMIN — Medication 100 MILLIGRAM(S): at 05:22

## 2018-04-14 RX ADMIN — Medication 10 MILLIGRAM(S): at 05:22

## 2018-04-14 RX ADMIN — CARBIDOPA AND LEVODOPA 1 TABLET(S): 25; 100 TABLET ORAL at 05:22

## 2018-04-14 RX ADMIN — METHOCARBAMOL 750 MILLIGRAM(S): 500 TABLET, FILM COATED ORAL at 05:22

## 2018-04-14 RX ADMIN — HEPARIN SODIUM 5000 UNIT(S): 5000 INJECTION INTRAVENOUS; SUBCUTANEOUS at 05:23

## 2018-04-14 RX ADMIN — HEPARIN SODIUM 5000 UNIT(S): 5000 INJECTION INTRAVENOUS; SUBCUTANEOUS at 14:13

## 2018-04-14 RX ADMIN — METHOCARBAMOL 750 MILLIGRAM(S): 500 TABLET, FILM COATED ORAL at 18:26

## 2018-04-14 RX ADMIN — Medication 10 MILLIGRAM(S): at 18:28

## 2018-04-14 RX ADMIN — Medication 10 MILLIGRAM(S): at 00:00

## 2018-04-14 RX ADMIN — MORPHINE SULFATE 15 MILLIGRAM(S): 50 CAPSULE, EXTENDED RELEASE ORAL at 10:07

## 2018-04-14 RX ADMIN — Medication 10 MILLIGRAM(S): at 23:10

## 2018-04-14 RX ADMIN — PANTOPRAZOLE SODIUM 40 MILLIGRAM(S): 20 TABLET, DELAYED RELEASE ORAL at 18:29

## 2018-04-14 RX ADMIN — Medication 75 MICROGRAM(S): at 05:22

## 2018-04-14 RX ADMIN — Medication 1 APPLICATION(S): at 05:25

## 2018-04-14 RX ADMIN — METHOCARBAMOL 750 MILLIGRAM(S): 500 TABLET, FILM COATED ORAL at 23:09

## 2018-04-14 RX ADMIN — HEPARIN SODIUM 5000 UNIT(S): 5000 INJECTION INTRAVENOUS; SUBCUTANEOUS at 23:09

## 2018-04-14 RX ADMIN — OXYCODONE AND ACETAMINOPHEN 1 TABLET(S): 5; 325 TABLET ORAL at 16:30

## 2018-04-14 RX ADMIN — METHOCARBAMOL 750 MILLIGRAM(S): 500 TABLET, FILM COATED ORAL at 00:00

## 2018-04-14 RX ADMIN — LIDOCAINE 1 PATCH: 4 CREAM TOPICAL at 12:17

## 2018-04-14 RX ADMIN — CARBIDOPA AND LEVODOPA 1 TABLET(S): 25; 100 TABLET ORAL at 14:19

## 2018-04-14 RX ADMIN — OXYCODONE AND ACETAMINOPHEN 1 TABLET(S): 5; 325 TABLET ORAL at 08:09

## 2018-04-14 RX ADMIN — Medication 100 MILLIGRAM(S): at 14:13

## 2018-04-15 RX ORDER — MORPHINE SULFATE 50 MG/1
15 CAPSULE, EXTENDED RELEASE ORAL ONCE
Qty: 0 | Refills: 0 | Status: DISCONTINUED | OUTPATIENT
Start: 2018-04-15 | End: 2018-04-15

## 2018-04-15 RX ORDER — MORPHINE SULFATE 50 MG/1
30 CAPSULE, EXTENDED RELEASE ORAL
Qty: 0 | Refills: 0 | Status: DISCONTINUED | OUTPATIENT
Start: 2018-04-15 | End: 2018-04-18

## 2018-04-15 RX ADMIN — Medication 10 MILLIGRAM(S): at 05:36

## 2018-04-15 RX ADMIN — HEPARIN SODIUM 5000 UNIT(S): 5000 INJECTION INTRAVENOUS; SUBCUTANEOUS at 23:12

## 2018-04-15 RX ADMIN — MONTELUKAST 10 MILLIGRAM(S): 4 TABLET, CHEWABLE ORAL at 11:36

## 2018-04-15 RX ADMIN — PANTOPRAZOLE SODIUM 40 MILLIGRAM(S): 20 TABLET, DELAYED RELEASE ORAL at 18:09

## 2018-04-15 RX ADMIN — HEPARIN SODIUM 5000 UNIT(S): 5000 INJECTION INTRAVENOUS; SUBCUTANEOUS at 14:39

## 2018-04-15 RX ADMIN — MORPHINE SULFATE 30 MILLIGRAM(S): 50 CAPSULE, EXTENDED RELEASE ORAL at 17:56

## 2018-04-15 RX ADMIN — MORPHINE SULFATE 30 MILLIGRAM(S): 50 CAPSULE, EXTENDED RELEASE ORAL at 07:00

## 2018-04-15 RX ADMIN — METHOCARBAMOL 750 MILLIGRAM(S): 500 TABLET, FILM COATED ORAL at 05:36

## 2018-04-15 RX ADMIN — METHOCARBAMOL 750 MILLIGRAM(S): 500 TABLET, FILM COATED ORAL at 11:35

## 2018-04-15 RX ADMIN — HEPARIN SODIUM 5000 UNIT(S): 5000 INJECTION INTRAVENOUS; SUBCUTANEOUS at 05:36

## 2018-04-15 RX ADMIN — Medication 1 APPLICATION(S): at 17:50

## 2018-04-15 RX ADMIN — METHOCARBAMOL 750 MILLIGRAM(S): 500 TABLET, FILM COATED ORAL at 23:12

## 2018-04-15 RX ADMIN — Medication 10 MILLIGRAM(S): at 11:35

## 2018-04-15 RX ADMIN — Medication 650 MILLIGRAM(S): at 17:49

## 2018-04-15 RX ADMIN — CARBIDOPA AND LEVODOPA 1 TABLET(S): 25; 100 TABLET ORAL at 23:12

## 2018-04-15 RX ADMIN — Medication 75 MICROGRAM(S): at 05:35

## 2018-04-15 RX ADMIN — Medication 100 MILLIGRAM(S): at 14:40

## 2018-04-15 RX ADMIN — CARBIDOPA AND LEVODOPA 1 TABLET(S): 25; 100 TABLET ORAL at 14:38

## 2018-04-15 RX ADMIN — CARBIDOPA AND LEVODOPA 1 TABLET(S): 25; 100 TABLET ORAL at 05:36

## 2018-04-15 RX ADMIN — METHOCARBAMOL 750 MILLIGRAM(S): 500 TABLET, FILM COATED ORAL at 17:53

## 2018-04-15 RX ADMIN — LIDOCAINE 1 PATCH: 4 CREAM TOPICAL at 23:32

## 2018-04-15 RX ADMIN — Medication 10 MILLIGRAM(S): at 17:57

## 2018-04-15 RX ADMIN — MORPHINE SULFATE 30 MILLIGRAM(S): 50 CAPSULE, EXTENDED RELEASE ORAL at 16:36

## 2018-04-15 RX ADMIN — OXYCODONE AND ACETAMINOPHEN 1 TABLET(S): 5; 325 TABLET ORAL at 11:36

## 2018-04-15 RX ADMIN — Medication 10 MILLIGRAM(S): at 23:12

## 2018-04-15 RX ADMIN — LIDOCAINE 1 PATCH: 4 CREAM TOPICAL at 11:38

## 2018-04-15 RX ADMIN — PANTOPRAZOLE SODIUM 40 MILLIGRAM(S): 20 TABLET, DELAYED RELEASE ORAL at 06:08

## 2018-04-15 RX ADMIN — MORPHINE SULFATE 30 MILLIGRAM(S): 50 CAPSULE, EXTENDED RELEASE ORAL at 06:07

## 2018-04-16 LAB
-  AMPICILLIN: SIGNIFICANT CHANGE UP
-  CIPROFLOXACIN: SIGNIFICANT CHANGE UP
-  NITROFURANTOIN: SIGNIFICANT CHANGE UP
-  TETRACYCLINE: SIGNIFICANT CHANGE UP
-  VANCOMYCIN: SIGNIFICANT CHANGE UP
CULTURE RESULTS: SIGNIFICANT CHANGE UP
METHOD TYPE: SIGNIFICANT CHANGE UP
ORGANISM # SPEC MICROSCOPIC CNT: SIGNIFICANT CHANGE UP
ORGANISM # SPEC MICROSCOPIC CNT: SIGNIFICANT CHANGE UP
SPECIMEN SOURCE: SIGNIFICANT CHANGE UP

## 2018-04-16 RX ORDER — FENTANYL CITRATE 50 UG/ML
1 INJECTION INTRAVENOUS
Qty: 0 | Refills: 0 | Status: DISCONTINUED | OUTPATIENT
Start: 2018-04-16 | End: 2018-04-18

## 2018-04-16 RX ORDER — MORPHINE SULFATE 50 MG/1
15 CAPSULE, EXTENDED RELEASE ORAL EVERY 12 HOURS
Qty: 0 | Refills: 0 | Status: DISCONTINUED | OUTPATIENT
Start: 2018-04-16 | End: 2018-04-18

## 2018-04-16 RX ADMIN — MORPHINE SULFATE 15 MILLIGRAM(S): 50 CAPSULE, EXTENDED RELEASE ORAL at 22:11

## 2018-04-16 RX ADMIN — CARBIDOPA AND LEVODOPA 1 TABLET(S): 25; 100 TABLET ORAL at 22:11

## 2018-04-16 RX ADMIN — Medication 650 MILLIGRAM(S): at 01:08

## 2018-04-16 RX ADMIN — MORPHINE SULFATE 30 MILLIGRAM(S): 50 CAPSULE, EXTENDED RELEASE ORAL at 17:17

## 2018-04-16 RX ADMIN — METHOCARBAMOL 750 MILLIGRAM(S): 500 TABLET, FILM COATED ORAL at 17:14

## 2018-04-16 RX ADMIN — Medication 75 MICROGRAM(S): at 06:25

## 2018-04-16 RX ADMIN — SENNA PLUS 2 TABLET(S): 8.6 TABLET ORAL at 22:11

## 2018-04-16 RX ADMIN — LIDOCAINE 1 PATCH: 4 CREAM TOPICAL at 22:22

## 2018-04-16 RX ADMIN — HEPARIN SODIUM 5000 UNIT(S): 5000 INJECTION INTRAVENOUS; SUBCUTANEOUS at 10:58

## 2018-04-16 RX ADMIN — PANTOPRAZOLE SODIUM 40 MILLIGRAM(S): 20 TABLET, DELAYED RELEASE ORAL at 06:25

## 2018-04-16 RX ADMIN — Medication 10 MILLIGRAM(S): at 23:46

## 2018-04-16 RX ADMIN — METHOCARBAMOL 750 MILLIGRAM(S): 500 TABLET, FILM COATED ORAL at 06:25

## 2018-04-16 RX ADMIN — HEPARIN SODIUM 5000 UNIT(S): 5000 INJECTION INTRAVENOUS; SUBCUTANEOUS at 22:12

## 2018-04-16 RX ADMIN — METHOCARBAMOL 750 MILLIGRAM(S): 500 TABLET, FILM COATED ORAL at 11:54

## 2018-04-16 RX ADMIN — LIDOCAINE 1 PATCH: 4 CREAM TOPICAL at 11:54

## 2018-04-16 RX ADMIN — Medication 100 MILLIGRAM(S): at 10:58

## 2018-04-16 RX ADMIN — FENTANYL CITRATE 1 PATCH: 50 INJECTION INTRAVENOUS at 11:00

## 2018-04-16 RX ADMIN — Medication 1 APPLICATION(S): at 06:27

## 2018-04-16 RX ADMIN — MORPHINE SULFATE 15 MILLIGRAM(S): 50 CAPSULE, EXTENDED RELEASE ORAL at 23:10

## 2018-04-16 RX ADMIN — Medication 10 MILLIGRAM(S): at 06:25

## 2018-04-16 RX ADMIN — PANTOPRAZOLE SODIUM 40 MILLIGRAM(S): 20 TABLET, DELAYED RELEASE ORAL at 17:15

## 2018-04-16 RX ADMIN — CARBIDOPA AND LEVODOPA 1 TABLET(S): 25; 100 TABLET ORAL at 11:00

## 2018-04-16 RX ADMIN — CARBIDOPA AND LEVODOPA 1 TABLET(S): 25; 100 TABLET ORAL at 06:25

## 2018-04-16 RX ADMIN — MONTELUKAST 10 MILLIGRAM(S): 4 TABLET, CHEWABLE ORAL at 10:59

## 2018-04-16 RX ADMIN — Medication 100 MILLIGRAM(S): at 22:11

## 2018-04-16 RX ADMIN — METHOCARBAMOL 750 MILLIGRAM(S): 500 TABLET, FILM COATED ORAL at 23:45

## 2018-04-16 RX ADMIN — Medication 10 MILLIGRAM(S): at 10:59

## 2018-04-16 RX ADMIN — MORPHINE SULFATE 30 MILLIGRAM(S): 50 CAPSULE, EXTENDED RELEASE ORAL at 06:27

## 2018-04-16 RX ADMIN — FENTANYL CITRATE 1 PATCH: 50 INJECTION INTRAVENOUS at 12:10

## 2018-04-16 RX ADMIN — Medication 1 APPLICATION(S): at 17:15

## 2018-04-16 RX ADMIN — Medication 10 MILLIGRAM(S): at 17:14

## 2018-04-16 RX ADMIN — HEPARIN SODIUM 5000 UNIT(S): 5000 INJECTION INTRAVENOUS; SUBCUTANEOUS at 06:25

## 2018-04-16 NOTE — PROGRESS NOTE ADULT - ASSESSMENT
continue pain control and rehab eval
A/p   77 y.o. woman with PMH as below admitted 4/6/2018 with osteoporosis and thoracic and lumbar compression fractures. Dr. Garcia performed T9, T12, L1, L2, and L3  medical consult called for Abdominal pain and nausea, vomiting    1- Nausea, vomiting, Abdominal pain  likely sec to Gastritis  Improving   continue protonix 40 mg BID   RUQ ultrasound reviewed, no cholecystitis, has Gall stones  LFTs NL  advance diet as tolerated    2- Compression fractures s/p Kypjhoplasty  continue Pain controll  NS follow up    3- Hypothyroidism  continue synthroid    4- Barb  Continue with CPAP at night    Plan discussed with Neurosurgery PA
A/p   77 y.o. woman with PMH as below admitted 4/6/2018 with osteoporosis and thoracic and lumbar compression fractures. Dr. Garcia performed T9, T12, L1, L2, and L3  medical consult called for Abdominal pain and nausea, vomiting    1- Nausea, vomiting, Abdominal pain  likely sec to Gastritis and pain meds  Improving   continue protonix 40 mg BID  Reglan PRN   RUQ ultrasound reviewed, no cholecystitis, has Gall stones  LFTs NL  advance diet as tolerated    2- Compression fractures s/p Kypjhoplasty  continue Pain controll  NS follow up    3- Hypothyroidism  continue synthroid    4- Barb  Continue with CPAP at night    Plan discussed with Neurosurgery PA taking care of the Pt today  Will sign off , call with Questions
As above.    CT reviewed - good placement of cement.  Small extravasation of cement left L1 lining wall of inferior body and foramen.
Possible pulmonary cement embolization  Recent kyphoplasty  Atelectasis  Musculoskeltal/reproducible chest wall pain    f/u ct chest resukts  obtain 2 decho  incentive spirometry  analgesics prn  encourage niv use-non complaint  dvt px  pt without shortness of breath, hypoxia or chest pain other than reproducible musculoskeltal pain, as this is the case, pt would not benefit from therapeutic  antithrombotics but will review ct chest for further recommendations  pt reahb  dvt px
Pulmonary cement embolization  Recent kyphoplasty  Atelectasis  Musculoskeltal/reproducible chest wall pain        incentive spirometry  analgesics prn  encourage niv use-non complaint  dvt px  pt without shortness of breath, hypoxia or chest pain other than reproducible musculoskeltal pain which has now improved, as this is the case, pt would not benefit from therapeutic  antithrombotics, systematic review with citation of pulmonary cement emboli after placed in paper chart discussing that pts who are not symtomatic or hypoxic do not benefit from antithrombotics  pt rehab  dvt px
Pulmonary cement embolization  Recent kyphoplasty  Atelectasis  Musculoskeltal/reproducible chest wall pain      obtain 2 decho  incentive spirometry  analgesics prn  encourage niv use-non complaint  dvt px  pt without shortness of breath, hypoxia or chest pain other than reproducible musculoskeltal pain which has now improved, as this is the case, pt would not benefit from therapeutic  antithrombotics  pt rehab  dvt px

## 2018-04-16 NOTE — CHART NOTE - NSCHARTNOTEFT_GEN_A_CORE
Registered Dietitian Follow-Up     Patient Profile Reviewed                           Yes [x]   No []     Nutrition History Previously Obtained        Yes [x]  No []       Pertinent Subjective Information: pt. reports feeling better, PO intake increased, nausea subsided. Family at bedside.      Pertinent Medical Interventions: S/P T9, 12 L1-3 Kyphoplasties, POD # 9, pt rehab, obtain 2 decho, incentive spirometry, encourage niv use-non complaint, dvt px      Diet order: regular     Anthropometrics:  - Ht. 157.4cm  - Wt. no new weights documented        Pertinent Lab Data: no new labs documented      Pertinent Meds: Heparin, Reglan, Colace, Levothyroxine, Zofran, Senna      Physical Findings:  - Appearance: alert & oriented  - GI function: denies symptoms, last BM 4/15  - Tubes:  - Oral/Mouth cavity: denies symptoms   - Skin: incision (BS 18)     Nutrition Requirements (from RD note on 4/11)  Weight Used:     Estimated Energy Needs    Continue [x]  Adjust [] 1500-1625kcal  Adjusted Energy Recommendations:   kcal/day        Estimated Protein Needs    Continue [x]  Adjust [] 50-60g  Adjusted Protein Recommendations:   gm/day        Estimated Fluid Needs        Continue [x]  Adjust [] 1ml/kcal  Adjusted Fluid Recommendations:   mL/day     Nutrient Intake: ~75% per pt. report        [] Previous Nutrition Diagnosis: Inadequate oral intake             [] Ongoing          [x] Resolved    [x] No active nutrition diagnosis identified at this time          Nutrition Intervention: meals and snacks       Goal/Expected Outcome: In 7 days pt. to continue to consume at least 75% at meal times      Indicator/Monitoring: energy intake, body composition, NFPF

## 2018-04-17 RX ORDER — LIDOCAINE 4 G/100G
2 CREAM TOPICAL DAILY
Qty: 0 | Refills: 0 | Status: COMPLETED | OUTPATIENT
Start: 2018-04-17 | End: 2018-04-17

## 2018-04-17 RX ADMIN — Medication 10 MILLIGRAM(S): at 23:26

## 2018-04-17 RX ADMIN — LIDOCAINE 2 PATCH: 4 CREAM TOPICAL at 10:53

## 2018-04-17 RX ADMIN — Medication 1 APPLICATION(S): at 05:51

## 2018-04-17 RX ADMIN — Medication 10 MILLIGRAM(S): at 10:54

## 2018-04-17 RX ADMIN — Medication 10 MILLIGRAM(S): at 05:49

## 2018-04-17 RX ADMIN — Medication 1 APPLICATION(S): at 17:38

## 2018-04-17 RX ADMIN — METHOCARBAMOL 750 MILLIGRAM(S): 500 TABLET, FILM COATED ORAL at 23:26

## 2018-04-17 RX ADMIN — LIDOCAINE 2 PATCH: 4 CREAM TOPICAL at 22:09

## 2018-04-17 RX ADMIN — MORPHINE SULFATE 30 MILLIGRAM(S): 50 CAPSULE, EXTENDED RELEASE ORAL at 22:09

## 2018-04-17 RX ADMIN — CARBIDOPA AND LEVODOPA 1 TABLET(S): 25; 100 TABLET ORAL at 14:45

## 2018-04-17 RX ADMIN — METHOCARBAMOL 750 MILLIGRAM(S): 500 TABLET, FILM COATED ORAL at 17:38

## 2018-04-17 RX ADMIN — Medication 10 MILLIGRAM(S): at 17:38

## 2018-04-17 RX ADMIN — OXYCODONE AND ACETAMINOPHEN 1 TABLET(S): 5; 325 TABLET ORAL at 14:46

## 2018-04-17 RX ADMIN — MORPHINE SULFATE 30 MILLIGRAM(S): 50 CAPSULE, EXTENDED RELEASE ORAL at 05:48

## 2018-04-17 RX ADMIN — OXYCODONE AND ACETAMINOPHEN 1 TABLET(S): 5; 325 TABLET ORAL at 08:15

## 2018-04-17 RX ADMIN — MORPHINE SULFATE 30 MILLIGRAM(S): 50 CAPSULE, EXTENDED RELEASE ORAL at 17:38

## 2018-04-17 RX ADMIN — Medication 75 MICROGRAM(S): at 05:48

## 2018-04-17 RX ADMIN — OXYCODONE AND ACETAMINOPHEN 1 TABLET(S): 5; 325 TABLET ORAL at 08:45

## 2018-04-17 RX ADMIN — CARBIDOPA AND LEVODOPA 1 TABLET(S): 25; 100 TABLET ORAL at 21:24

## 2018-04-17 RX ADMIN — METHOCARBAMOL 750 MILLIGRAM(S): 500 TABLET, FILM COATED ORAL at 10:54

## 2018-04-17 RX ADMIN — OXYCODONE AND ACETAMINOPHEN 1 TABLET(S): 5; 325 TABLET ORAL at 22:16

## 2018-04-17 RX ADMIN — SENNA PLUS 2 TABLET(S): 8.6 TABLET ORAL at 21:23

## 2018-04-17 RX ADMIN — HEPARIN SODIUM 5000 UNIT(S): 5000 INJECTION INTRAVENOUS; SUBCUTANEOUS at 21:24

## 2018-04-17 RX ADMIN — Medication 100 MILLIGRAM(S): at 21:24

## 2018-04-17 RX ADMIN — PANTOPRAZOLE SODIUM 40 MILLIGRAM(S): 20 TABLET, DELAYED RELEASE ORAL at 05:48

## 2018-04-17 RX ADMIN — PANTOPRAZOLE SODIUM 40 MILLIGRAM(S): 20 TABLET, DELAYED RELEASE ORAL at 17:38

## 2018-04-17 RX ADMIN — MONTELUKAST 10 MILLIGRAM(S): 4 TABLET, CHEWABLE ORAL at 10:54

## 2018-04-17 RX ADMIN — MORPHINE SULFATE 15 MILLIGRAM(S): 50 CAPSULE, EXTENDED RELEASE ORAL at 10:28

## 2018-04-17 RX ADMIN — HEPARIN SODIUM 5000 UNIT(S): 5000 INJECTION INTRAVENOUS; SUBCUTANEOUS at 14:45

## 2018-04-17 RX ADMIN — CARBIDOPA AND LEVODOPA 1 TABLET(S): 25; 100 TABLET ORAL at 05:50

## 2018-04-17 RX ADMIN — MORPHINE SULFATE 15 MILLIGRAM(S): 50 CAPSULE, EXTENDED RELEASE ORAL at 11:30

## 2018-04-17 RX ADMIN — OXYCODONE AND ACETAMINOPHEN 1 TABLET(S): 5; 325 TABLET ORAL at 02:28

## 2018-04-17 RX ADMIN — METHOCARBAMOL 750 MILLIGRAM(S): 500 TABLET, FILM COATED ORAL at 05:49

## 2018-04-17 RX ADMIN — OXYCODONE AND ACETAMINOPHEN 1 TABLET(S): 5; 325 TABLET ORAL at 21:25

## 2018-04-17 RX ADMIN — OXYCODONE AND ACETAMINOPHEN 1 TABLET(S): 5; 325 TABLET ORAL at 15:30

## 2018-04-17 RX ADMIN — HEPARIN SODIUM 5000 UNIT(S): 5000 INJECTION INTRAVENOUS; SUBCUTANEOUS at 05:50

## 2018-04-18 ENCOUNTER — TRANSCRIPTION ENCOUNTER (OUTPATIENT)
Age: 78
End: 2018-04-18

## 2018-04-18 RX ORDER — LIDOCAINE 4 G/100G
3 CREAM TOPICAL DAILY
Qty: 0 | Refills: 0 | Status: DISCONTINUED | OUTPATIENT
Start: 2018-04-18 | End: 2018-04-18

## 2018-04-18 RX ORDER — MORPHINE SULFATE 50 MG/1
1 CAPSULE, EXTENDED RELEASE ORAL
Qty: 0 | Refills: 0 | DISCHARGE
Start: 2018-04-18

## 2018-04-18 RX ORDER — LIDOCAINE 4 G/100G
3 CREAM TOPICAL
Qty: 60 | Refills: 0 | OUTPATIENT
Start: 2018-04-18

## 2018-04-18 RX ORDER — BACITRACIN ZINC 500 UNIT/G
1 OINTMENT IN PACKET (EA) TOPICAL
Qty: 0 | Refills: 0 | COMMUNITY
Start: 2018-04-18

## 2018-04-18 RX ORDER — MORPHINE SULFATE 50 MG/1
0 CAPSULE, EXTENDED RELEASE ORAL
Qty: 0 | Refills: 0 | COMMUNITY

## 2018-04-18 RX ADMIN — HEPARIN SODIUM 5000 UNIT(S): 5000 INJECTION INTRAVENOUS; SUBCUTANEOUS at 13:03

## 2018-04-18 RX ADMIN — HEPARIN SODIUM 5000 UNIT(S): 5000 INJECTION INTRAVENOUS; SUBCUTANEOUS at 05:45

## 2018-04-18 RX ADMIN — MORPHINE SULFATE 30 MILLIGRAM(S): 50 CAPSULE, EXTENDED RELEASE ORAL at 05:49

## 2018-04-18 RX ADMIN — MORPHINE SULFATE 30 MILLIGRAM(S): 50 CAPSULE, EXTENDED RELEASE ORAL at 06:38

## 2018-04-18 RX ADMIN — Medication 1 APPLICATION(S): at 17:51

## 2018-04-18 RX ADMIN — Medication 10 MILLIGRAM(S): at 05:49

## 2018-04-18 RX ADMIN — MONTELUKAST 10 MILLIGRAM(S): 4 TABLET, CHEWABLE ORAL at 11:30

## 2018-04-18 RX ADMIN — MORPHINE SULFATE 15 MILLIGRAM(S): 50 CAPSULE, EXTENDED RELEASE ORAL at 11:49

## 2018-04-18 RX ADMIN — METHOCARBAMOL 750 MILLIGRAM(S): 500 TABLET, FILM COATED ORAL at 11:30

## 2018-04-18 RX ADMIN — METHOCARBAMOL 750 MILLIGRAM(S): 500 TABLET, FILM COATED ORAL at 17:50

## 2018-04-18 RX ADMIN — Medication 10 MILLIGRAM(S): at 17:50

## 2018-04-18 RX ADMIN — CARBIDOPA AND LEVODOPA 1 TABLET(S): 25; 100 TABLET ORAL at 13:02

## 2018-04-18 RX ADMIN — CARBIDOPA AND LEVODOPA 1 TABLET(S): 25; 100 TABLET ORAL at 05:45

## 2018-04-18 RX ADMIN — Medication 100 MILLIGRAM(S): at 13:03

## 2018-04-18 RX ADMIN — Medication 75 MICROGRAM(S): at 05:45

## 2018-04-18 RX ADMIN — METHOCARBAMOL 750 MILLIGRAM(S): 500 TABLET, FILM COATED ORAL at 05:46

## 2018-04-18 RX ADMIN — MORPHINE SULFATE 15 MILLIGRAM(S): 50 CAPSULE, EXTENDED RELEASE ORAL at 17:52

## 2018-04-18 RX ADMIN — Medication 10 MILLIGRAM(S): at 11:30

## 2018-04-18 RX ADMIN — MORPHINE SULFATE 30 MILLIGRAM(S): 50 CAPSULE, EXTENDED RELEASE ORAL at 17:50

## 2018-04-18 RX ADMIN — PANTOPRAZOLE SODIUM 40 MILLIGRAM(S): 20 TABLET, DELAYED RELEASE ORAL at 05:49

## 2018-04-18 RX ADMIN — Medication 1 APPLICATION(S): at 05:44

## 2018-04-18 RX ADMIN — LIDOCAINE 3 PATCH: 4 CREAM TOPICAL at 11:29

## 2018-04-18 RX ADMIN — PANTOPRAZOLE SODIUM 40 MILLIGRAM(S): 20 TABLET, DELAYED RELEASE ORAL at 17:50

## 2018-04-18 NOTE — DISCHARGE NOTE ADULT - HOSPITAL COURSE
77 year old female admitted 4.6.18 for a T9, T12, L1, L2 and L3 Kyphoplasty. She had improvement in her pre op back pain. She complained of right rib pain postop. CXR showed 77 year old female admitted 4.6.18 for a T9, T12, L1, L2 and L3 Kyphoplasty. She had improvement in her pre op back pain. She complained of right rib pain postop. Chest Rib XRay showed Multilevel kyphoplasty with linear branching radiopacity right mid to upper lung field compatible with methylmethacrylate embolism. Pulmonary was consulted and recommended a CT Chest with IV contrast, and incentive spirometry. CT chest Multiple small branching tubular hyperdensities within the right upper lobe pulmonary vasculature compatible with cement emboli from recent vertebroplasty. No intervention was recommended. Pt was started on Lidoderm patches which helped with her pain. She ambulated better with physical therapy. On 4.18 she was discharge to [facility]

## 2018-04-18 NOTE — PROGRESS NOTE ADULT - ATTENDING COMMENTS
as above
Patient is starting to feel better but still will need rehab because of difficulty with pain while UOB.  F/U chest Xray shows a small amount of cement in lung which is extravasation from her compression fractures and is a common finding and has no clinical significance to pulmonary function, but may be partly the cause for her Right flank/rib pain--which is improving as would be expected.  Continue PT and UOB.  Hoping for rehab eligibility by early next week. D/w patient and family.
The patient was seen on morning rounds.  The patient's daughter is at bedside.  The patient's daughter expresses concern regarding the patient's new right-sided torso pain.  CT of the thoracic spine was performed, which did not demonstrate any loida evidence of posterior rib fracture.  Bone cement in appropriate position without any evidence of abnormal extravasation.    Given the patient's significant osteoporosis, we will obtain a chest x-ray in order to ensure that the patient does not have any rib fractures.  If ultimately imaging is negative, continue to mobilize patient out of bed with physical therapy.  Agree with discontinuation of PCA.    PT/rehab eval
as above
as above
as above. To [facility] rhab today. Follow up as outpatient in 2 weeks.
patient's CT shows no obvious cement in canal.  Patient will need rehab eval.
As above.  Pain much better. She states 0.5/10 today.

## 2018-04-18 NOTE — PROGRESS NOTE ADULT - SUBJECTIVE AND OBJECTIVE BOX
POD # 7    S/P kyphoplasties      Vital Signs Last 24 Hrs  T(C): 36.8 (13 Apr 2018 00:18), Max: 36.8 (13 Apr 2018 00:18)  T(F): 98.2 (13 Apr 2018 00:18), Max: 98.2 (13 Apr 2018 00:18)  HR: 89 (13 Apr 2018 00:18) (82 - 89)  BP: 119/68 (13 Apr 2018 00:18) (119/68 - 132/59)  BP(mean): --  RR: 18 (13 Apr 2018 00:18) (16 - 18)  SpO2: 96% (12 Apr 2018 11:16) (96% - 96%)    PHYSICAL EXAM: neuro intact.  Back pain much improved.  Still just right flank pain long rib cage region.  incisions all CDI          MEDICATIONS:  Antibiotics:  trimethoprim  160 mG/sulfamethoxazole 800 mG 1 Tablet(s) Oral two times a day    Neuro:  acetaminophen   Tablet 650 milliGRAM(s) Oral every 4 hours PRN  acetaminophen  IVPB. 1000 milliGRAM(s) IV Intermittent once  carbidopa/levodopa  10/100 1 Tablet(s) Oral three times a day  diphenhydrAMINE   Capsule 25 milliGRAM(s) Oral every 4 hours PRN  fentaNYL   Patch  12 MICROgram(s)/Hr 1 Patch Transdermal every 72 hours  methocarbamol 750 milliGRAM(s) Oral every 6 hours  morphine ER Tablet 15 milliGRAM(s) Oral <User Schedule> PRN  morphine ER Tablet 30 milliGRAM(s) Oral two times a day  ondansetron Injectable 4 milliGRAM(s) IV Push every 6 hours PRN  oxyCODONE    5 mG/acetaminophen 325 mG 1 Tablet(s) Oral every 4 hours PRN    Anticoagulation:  heparin  Injectable 5000 Unit(s) SubCutaneous every 8 hours    OTHER:  BACItracin   Ointment 1 Application(s) Topical two times a day  docusate sodium 100 milliGRAM(s) Oral three times a day  levothyroxine 75 MICROGram(s) Oral daily  metoclopramide 10 milliGRAM(s) Oral every 6 hours  montelukast 10 milliGRAM(s) Oral daily  pantoprazole    Tablet 40 milliGRAM(s) Oral two times a day  predniSONE   Tablet 10 milliGRAM(s) Oral daily  senna 2 Tablet(s) Oral at bedtime    IVF: none        LABS:                        10.1   7.50  )-----------( 152      ( 11 Apr 2018 08:10 )             30.8     04-11    139  |  103  |  6<L>  ----------------------------<  118<H>  4.0   |  24  |  0.8    Ca    6.6<L>      11 Apr 2018 08:10    TPro  5.5<L>  /  Alb  3.5  /  TBili  0.8  /  DBili  0.3<H>  /  AST  35  /  ALT  8   /  AlkPhos  86  04-11          RADIOLOGY:      Assessment:      Plan:
POD # 9    S/P kyphoplasty      Vital Signs Last 24 Hrs  T(C): 37.1 (2018 23:53), Max: 37.1 (2018 23:53)  T(F): 98.7 (2018 23:53), Max: 98.7 (2018 23:53)  HR: 81 (15 Apr 2018 07:36) (81 - 96)  BP: 127/58 (15 Apr 2018 07:36) (98/48 - 154/66)  BP(mean): --  RR: 16 (15 Apr 2018 07:36) (16 - 18)  SpO2: --    PHYSICAL EXAM:stable neuro intact, pain better    MEDICATIONS:  Antibiotics:  trimethoprim  160 mG/sulfamethoxazole 800 mG 1 Tablet(s) Oral two times a day    Neuro:  acetaminophen   Tablet 650 milliGRAM(s) Oral every 4 hours PRN  acetaminophen  IVPB. 1000 milliGRAM(s) IV Intermittent once  carbidopa/levodopa  10/100 1 Tablet(s) Oral three times a day  diphenhydrAMINE   Capsule 25 milliGRAM(s) Oral every 4 hours PRN  methocarbamol 750 milliGRAM(s) Oral every 6 hours  morphine ER Tablet 15 milliGRAM(s) Oral <User Schedule> PRN  morphine ER Tablet 30 milliGRAM(s) Oral two times a day  ondansetron Injectable 4 milliGRAM(s) IV Push every 6 hours PRN  oxyCODONE    5 mG/acetaminophen 325 mG 1 Tablet(s) Oral every 4 hours PRN    Anticoagulation:  heparin  Injectable 5000 Unit(s) SubCutaneous every 8 hours    OTHER:  BACItracin   Ointment 1 Application(s) Topical two times a day  docusate sodium 100 milliGRAM(s) Oral three times a day  levothyroxine 75 MICROGram(s) Oral daily  lidocaine   Patch 1 Patch Transdermal daily  metoclopramide 10 milliGRAM(s) Oral every 6 hours  montelukast 10 milliGRAM(s) Oral daily  pantoprazole    Tablet 40 milliGRAM(s) Oral two times a day  predniSONE   Tablet 10 milliGRAM(s) Oral daily  senna 2 Tablet(s) Oral at bedtime      Urinalysis Basic - ( 2018 15:20 )    Color: Yellow / Appearance: Clear / S.010 / pH: x  Gluc: x / Ketone: Negative  / Bili: Negative / Urobili: 1.0 mg/dL   Blood: x / Protein: Negative mg/dL / Nitrite: Negative   Leuk Esterase: Trace / RBC: x / WBC 1-2 /HPF   Sq Epi: x / Non Sq Epi: Moderate /HPF / Bacteria: Few /HPF      Assessment:s/p kyphoplasty, right flank pain better      Plan:Rehab eval tomorrow
INTERVAL HPI/OVERNIGHT EVENTS:  HPI:  77 y.o. woman with PMH as below admitted 4/6/2018 with osteoporosis and thoracic and lumbar compression fractures.   Dr. Garcia performed T9, T12, L1, L2, and L3 kyphoplasties  Medical consult was  called for Abdominal pain and Nausea, vomiting  denies chest pain or shortness of breath    Pt seen and examined today for follow up of Nausea, vomiting and Abdominal  started on PPIs  pain, nausea, vomiting better    REVIEW OF SYSTEMS:  CONSTITUTIONAL: No fever, weight loss, or fatigue  EYES: No eye pain, visual disturbances, or discharge  ENMT:  No difficulty hearing, tinnitus, vertigo; No sinus or throat pain  NECK: No pain or stiffness  BREASTS: No pain, masses, or nipple discharge  RESPIRATORY: No cough, wheezing, chills or hemoptysis; No shortness of breath  CARDIOVASCULAR: No chest pain, palpitations, dizziness, or leg swelling  GASTROINTESTINAL: mild nausea  GENITOURINARY: No dysuria, frequency, hematuria, or incontinence  NEUROLOGICAL: No headaches, memory loss, loss of strength, numbness, or tremors  SKIN: No itching, burning, rashes, or lesions   LYMPH NODES: No enlarged glands  ENDOCRINE: No heat or cold intolerance; No hair loss  MUSCULOSKELETAL: No joint pain or swelling; No muscle, back, or extremity pain  PSYCHIATRIC: No depression, anxiety, mood swings, or difficulty sleeping  HEME/LYMPH: No easy bruising, or bleeding gums  ALLERY AND IMMUNOLOGIC: No hives or eczema    VITALS:  T(F): 98, Max: 98 (04-11-18 @ 16:17)  HR: 90  BP: 126/58  RR: 19  SpO2: --    PHYSICAL EXAM:  GENERAL: NAD, well-groomed, well-developed  HEAD:  Atraumatic, Normocephalic  EYES: EOMI, PERRLA, conjunctiva and sclera clear  ENMT: No tonsillar erythema, exudates, or enlargement; Moist mucous membranes, Good dentition, No lesions  NECK: Supple, No JVD, Normal thyroid  NERVOUS SYSTEM:  Alert & Oriented X3, Good concentration; Motor Strength 5/5 B/L upper and lower extremities; DTRs 2+ intact and symmetric  CHEST/LUNG: Clear to percussion bilaterally; No rales, rhonchi, wheezing, or rubs  HEART: Regular rate and rhythm; No murmurs, rubs, or gallops  ABDOMEN: Soft, Nontender, Nondistended; Bowel sounds present  EXTREMITIES:  2+ Peripheral Pulses, No clubbing, cyanosis, or edema  LYMPH: No lymphadenopathy noted  SKIN: No rashes or lesions      LABS:    04-11    139  |  103  |  6<L>  ----------------------------<  118<H>  4.0   |  24  |  0.8    Ca    6.6<L>      11 Apr 2018 08:10    TPro  5.5<L>  /  Alb  3.5  /  TBili  0.8  /  DBili  0.3<H>  /  AST  35  /  ALT  8   /  AlkPhos  86  04-11                          10.1   7.50  )-----------( 152      ( 11 Apr 2018 08:10 )             30.8           RADIOLOGY & ADDITIONAL TESTS:    Imaging Personally Reviewed:  [ ] YES  [ ] NO    MEDICATIONS:     MEDICATIONS  (STANDING):  acetaminophen  IVPB. 1000 milliGRAM(s) IV Intermittent once  BACItracin   Ointment 1 Application(s) Topical two times a day  carbidopa/levodopa  10/100 1 Tablet(s) Oral three times a day  docusate sodium 100 milliGRAM(s) Oral three times a day  fentaNYL   Patch  12 MICROgram(s)/Hr 1 Patch Transdermal every 72 hours  heparin  Injectable 5000 Unit(s) SubCutaneous every 8 hours  levothyroxine 75 MICROGram(s) Oral daily  methocarbamol 750 milliGRAM(s) Oral every 6 hours  metoclopramide 10 milliGRAM(s) Oral every 6 hours  montelukast 10 milliGRAM(s) Oral daily  morphine ER Tablet 30 milliGRAM(s) Oral two times a day  pantoprazole    Tablet 40 milliGRAM(s) Oral two times a day  predniSONE   Tablet 10 milliGRAM(s) Oral daily  senna 2 Tablet(s) Oral at bedtime  trimethoprim  160 mG/sulfamethoxazole 800 mG 1 Tablet(s) Oral two times a day    MEDICATIONS  (PRN):  acetaminophen   Tablet 650 milliGRAM(s) Oral every 4 hours PRN For Temp greater than 38 C (100.4 F)  diphenhydrAMINE   Capsule 25 milliGRAM(s) Oral every 4 hours PRN Rash and/or Itching  morphine  - Injectable 4 milliGRAM(s) IV Push every 3 hours PRN breakthrough pain  morphine ER Tablet 15 milliGRAM(s) Oral <User Schedule> PRN break through pain  ondansetron Injectable 4 milliGRAM(s) IV Push every 6 hours PRN Nausea
INTERVAL HPI/OVERNIGHT EVENTS:  HPI:  77 y.o. woman with PMH as below admitted 4/6/2018 with osteoporosis and thoracic and lumbar compression fractures.   Dr. Garcia performed T9, T12, L1, L2, and L3 kyphoplasties  Medical consult was  called for Abdominal pain and Nausea, vomiting  denies chest pain or shortness of breath    Pt seen and examined today for follow up of Nausea, vomiting and Abdominal  started on PPIs  pain, nausea, vomiting better   tolaerating diet      REVIEW OF SYSTEMS:  CONSTITUTIONAL: No fever, weight loss, or fatigue  EYES: No eye pain, visual disturbances, or discharge  ENMT:  No difficulty hearing, tinnitus, vertigo; No sinus or throat pain  NECK: No pain or stiffness  BREASTS: No pain, masses, or nipple discharge  RESPIRATORY: No cough, wheezing, chills or hemoptysis; No shortness of breath  CARDIOVASCULAR: No chest pain, palpitations, dizziness, or leg swelling  GASTROINTESTINAL: mild nausea  GENITOURINARY: No dysuria, frequency, hematuria, or incontinence  NEUROLOGICAL: No headaches, memory loss, loss of strength, numbness, or tremors  SKIN: No itching, burning, rashes, or lesions   LYMPH NODES: No enlarged glands  ENDOCRINE: No heat or cold intolerance; No hair loss  MUSCULOSKELETAL: No joint pain or swelling; No muscle, back, or extremity pain  PSYCHIATRIC: No depression, anxiety, mood swings, or difficulty sleeping  HEME/LYMPH: No easy bruising, or bleeding gums  ALLERY AND IMMUNOLOGIC: No hives or eczema    VITALS:  T(F): 98, Max: 99.6 (04-11-18 @ 23:43)  HR: 82  BP: 132/59  RR: 16  SpO2: 96%    PHYSICAL EXAM:  GENERAL: NAD,  HEAD:  Atraumatic, Normocephalic  EYES: EOMI, PERRLA, conjunctiva and sclera clear  ENMT: No tonsillar erythema, exudates, or enlargement; Moist mucous membranes, Good dentition, No lesions  NECK: Supple, No JVD, Normal thyroid  NERVOUS SYSTEM:  Alert & Oriented X3, Good concentration; Motor Strength 5/5 B/L upper and lower extremities; DTRs 2+ intact and symmetric  CHEST/LUNG: Clear to percussion bilaterally; No rales, rhonchi, wheezing, or rubs  HEART: Regular rate and rhythm; No murmurs, rubs, or gallops  ABDOMEN: Soft, Nontender, Nondistended; Bowel sounds present  EXTREMITIES:  2+ Peripheral Pulses, No clubbing, cyanosis, or edema  LYMPH: No lymphadenopathy noted  SKIN: No rashes or lesions      LABS:    04-11    139  |  103  |  6<L>  ----------------------------<  118<H>  4.0   |  24  |  0.8    Ca    6.6<L>      11 Apr 2018 08:10    TPro  5.5<L>  /  Alb  3.5  /  TBili  0.8  /  DBili  0.3<H>  /  AST  35  /  ALT  8   /  AlkPhos  86  04-11                          10.1   7.50  )-----------( 152      ( 11 Apr 2018 08:10 )             30.8           RADIOLOGY & ADDITIONAL TESTS:    Imaging Personally Reviewed:  [ ] YES  [ ] NO    MEDICATIONS:     MEDICATIONS  (STANDING):  acetaminophen  IVPB. 1000 milliGRAM(s) IV Intermittent once  BACItracin   Ointment 1 Application(s) Topical two times a day  carbidopa/levodopa  10/100 1 Tablet(s) Oral three times a day  docusate sodium 100 milliGRAM(s) Oral three times a day  fentaNYL   Patch  12 MICROgram(s)/Hr 1 Patch Transdermal every 72 hours  heparin  Injectable 5000 Unit(s) SubCutaneous every 8 hours  levothyroxine 75 MICROGram(s) Oral daily  methocarbamol 750 milliGRAM(s) Oral every 6 hours  metoclopramide 10 milliGRAM(s) Oral every 6 hours  montelukast 10 milliGRAM(s) Oral daily  morphine ER Tablet 30 milliGRAM(s) Oral two times a day  pantoprazole    Tablet 40 milliGRAM(s) Oral two times a day  predniSONE   Tablet 10 milliGRAM(s) Oral daily  senna 2 Tablet(s) Oral at bedtime  trimethoprim  160 mG/sulfamethoxazole 800 mG 1 Tablet(s) Oral two times a day    MEDICATIONS  (PRN):  acetaminophen   Tablet 650 milliGRAM(s) Oral every 4 hours PRN For Temp greater than 38 C (100.4 F)  diphenhydrAMINE   Capsule 25 milliGRAM(s) Oral every 4 hours PRN Rash and/or Itching  morphine  - Injectable 4 milliGRAM(s) IV Push every 3 hours PRN breakthrough pain  morphine ER Tablet 15 milliGRAM(s) Oral <User Schedule> PRN break through pain  ondansetron Injectable 4 milliGRAM(s) IV Push every 6 hours PRN Nausea
NEUROSURGERY POST OP NOTE:    S/P T9 T12 L1 L2 L3 kyphoplasty  Pt seen and examined at bedside, Pt reports back pain and some difficulty ambulating  Denies radicular pain parasthesia           T(C): 37 (04-06-18 @ 09:36), Max: 37 (04-06-18 @ 09:36)  HR: 86 (04-06-18 @ 15:30) (75 - 94)  BP: 127/62 (04-06-18 @ 15:30) (127/62 - 219/86)  RR: 18 (04-06-18 @ 15:30) (15 - 19)  SpO2: 97% (04-06-18 @ 15:30) (90% - 98%)      04-06-18 @ 07:01  -  04-06-18 @ 17:58  --------------------------------------------------------  IN: 120 mL / OUT: 850 mL / NET: -730 mL        acetaminophen  IVPB. 1000 milliGRAM(s) IV Intermittent once  ondansetron Injectable 4 milliGRAM(s) IV Push once PRN  oxyCODONE    5 mG/acetaminophen 325 mG 2 Tablet(s) Oral every 6 hours PRN      Exam:   Awake alert responsive NAD  PERRLA EOMI  Incision C/D/I  Motor B/L L/E 5/5 distal 5-/5 proximal  Senosry intact to L/T  symetrical DTRs        WOUND/DRAINS: N/A        Assessment/Plan  Stable  Pain Control  serial neuro checks  PT/Rehab  Discussed with Attending
POD #  4    S/P Kyphoplasty T9, T12,,L1, L2, L3           Patient seen and examined at bedside patient states her right sided pain in improved.  She said she has some itching on her back around the site.       Vital Signs Last 24 Hrs  T(C): 36.9 (10 Apr 2018 07:56), Max: 36.9 (10 Apr 2018 07:56)  T(F): 98.4 (10 Apr 2018 07:56), Max: 98.4 (10 Apr 2018 07:56)  HR: 86 (10 Apr 2018 07:56) (81 - 86)  BP: 168/66 (10 Apr 2018 07:56) (130/70 - 182/72)  BP(mean): --  RR: 20 (10 Apr 2018 07:56) (18 - 20)  SpO2: --    PHYSICAL EXAM:  A&OX3, PERRLA , EOM ( I)   MS  RUE 5/5   LUE 5/5         RLE 5/5   LLE 5/5   Incision - clean dry and intact                 some redness around her back possibly from dressing ?         MEDICATIONS:  Antibiotics:  trimethoprim  160 mG/sulfamethoxazole 800 mG 1 Tablet(s) Oral two times a day    Neuro:  acetaminophen   Tablet 650 milliGRAM(s) Oral every 4 hours PRN  acetaminophen  IVPB. 1000 milliGRAM(s) IV Intermittent once  carbidopa/levodopa  10/100 1 Tablet(s) Oral three times a day  diphenhydrAMINE   Capsule 25 milliGRAM(s) Oral every 4 hours PRN  fentaNYL   Patch  12 MICROgram(s)/Hr 1 Patch Transdermal every 72 hours  methocarbamol 750 milliGRAM(s) Oral every 8 hours  methocarbamol 750 milliGRAM(s) Oral every 6 hours  methocarbamol 750 milliGRAM(s) Oral every 8 hours PRN  morphine  - Injectable 4 milliGRAM(s) IV Push every 3 hours PRN  morphine ER Tablet 15 milliGRAM(s) Oral <User Schedule> PRN  morphine ER Tablet 30 milliGRAM(s) Oral two times a day  ondansetron Injectable 4 milliGRAM(s) IV Push every 6 hours PRN    Anticoagulation:  heparin  Injectable 5000 Unit(s) SubCutaneous every 8 hours    OTHER:  BACItracin   Ointment 1 Application(s) Topical two times a day  docusate sodium 100 milliGRAM(s) Oral three times a day  levothyroxine 75 MICROGram(s) Oral daily  montelukast 10 milliGRAM(s) Oral daily  predniSONE   Tablet 10 milliGRAM(s) Oral daily  senna 2 Tablet(s) Oral at bedtime        A/P        S/P Kyphoplasty  T9, T12, L1, L2, L3              D/c PCA              D/C IVF              Bacitracin to incision              Benadryl              Encourage OOB
POD # 1    S/P Thoracic 9,12 Lumbar 1,2,3 Kyphoplasty  Pt seen and examined at bedside, Pt reports back pain incisional pain w/ B/L L/E radicular pain to below knee denies parasthesthias       Vital Signs Last 24 Hrs  T(C): 37.5 (2018 07:52), Max: 37.5 (2018 07:52)  T(F): 99.5 (2018 07:52), Max: 99.5 (2018 07:52)  HR: 108 (2018 07:52) (86 - 114)  BP: 178/79 (2018 07:52) (127/62 - 178/79)  BP(mean): --  RR: 20 (2018 07:52) (18 - 20)  SpO2: 97% (2018 15:30) (97% - 97%)    PHYSICAL EXAM:  A&Ox3 Moderate discomfort   Incision C/D/I  Motor strength  B/L L/E 4+/5 proximally 2/2 to pain  5/5 distal  sensory grossly intact to L/T  symetrical DTRs          MEDICATIONS:  Antibiotics:    Neuro:  acetaminophen   Tablet 650 milliGRAM(s) Oral every 4 hours PRN  acetaminophen  IVPB. 1000 milliGRAM(s) IV Intermittent once  carbidopa/levodopa  10/100 1 Tablet(s) Oral three times a day  fentaNYL   Patch  12 MICROgram(s)/Hr 1 Patch Transdermal every 72 hours  methocarbamol 750 milliGRAM(s) Oral every 8 hours  methocarbamol 750 milliGRAM(s) Oral every 8 hours PRN  morphine  - Injectable 4 milliGRAM(s) IV Push every 3 hours PRN  morphine ER Tablet 15 milliGRAM(s) Oral once  morphine ER Tablet 30 milliGRAM(s) Oral two times a day  morphine PCA (1 mG/mL) 30 milliLiter(s) PCA Continuous PCA Continuous  ondansetron Injectable 4 milliGRAM(s) IV Push every 6 hours PRN    Anticoagulation:    OTHER:  docusate sodium 100 milliGRAM(s) Oral three times a day  levothyroxine 75 MICROGram(s) Oral daily  montelukast 10 milliGRAM(s) Oral daily  predniSONE   Tablet 10 milliGRAM(s) Oral daily  senna 2 Tablet(s) Oral at bedtime    IVF:  sodium chloride 0.9%. 1000 milliLiter(s) IV Continuous <Continuous>        LABS:            Urinalysis Basic - ( 2018 13:31 )    Color: Yellow / Appearance: Clear / S.010 / pH: x  Gluc: x / Ketone: Trace  / Bili: Negative / Urobili: 0.2 mg/dL   Blood: x / Protein: Negative mg/dL / Nitrite: Negative   Leuk Esterase: Trace / RBC: 1-2 /HPF / WBC 3-5 /HPF   Sq Epi: x / Non Sq Epi: x / Bacteria: x        Assessment: S/P multi level kyphoplasty       Plan: stable   Anestheisa consult for pain management  CT scan Thoracic/Lumbar spine  serial neuro checks  D/W attending
POD # 11    S/P T9, T12, L1-3 Kyphoplasties    Pt seen and examined at bedside. Pt c/o right flank pain. Sts pre-op back pain improved. Denies Lower extremity radiculopathy, parasthesias. Sts lidoderm patch is helpful.    Vital Signs Last 24 Hrs  T(C): 36.2 (17 Apr 2018 07:03), Max: 37.1 (16 Apr 2018 16:30)  T(F): 97.2 (17 Apr 2018 07:03), Max: 98.7 (16 Apr 2018 16:30)  HR: 83 (17 Apr 2018 07:03) (83 - 89)  BP: 108/52 (17 Apr 2018 07:03) (104/57 - 138/63)  BP(mean): --  RR: 18 (17 Apr 2018 07:03) (18 - 18)  SpO2: --    PHYSICAL EXAM:  Strength 5/5  Sensation intact to light touch  no clonus    MEDICATIONS:  Antibiotics:  trimethoprim  160 mG/sulfamethoxazole 800 mG 1 Tablet(s) Oral two times a day    Neuro:  acetaminophen   Tablet 650 milliGRAM(s) Oral every 4 hours PRN  acetaminophen  IVPB. 1000 milliGRAM(s) IV Intermittent once  carbidopa/levodopa  10/100 1 Tablet(s) Oral three times a day  diphenhydrAMINE   Capsule 25 milliGRAM(s) Oral every 4 hours PRN  fentaNYL   Patch  12 MICROgram(s)/Hr 1 Patch Transdermal every 72 hours  methocarbamol 750 milliGRAM(s) Oral every 6 hours  morphine ER Tablet 30 milliGRAM(s) Oral two times a day  morphine ER Tablet 15 milliGRAM(s) Oral every 12 hours PRN  ondansetron Injectable 4 milliGRAM(s) IV Push every 6 hours PRN  oxyCODONE    5 mG/acetaminophen 325 mG 1 Tablet(s) Oral every 4 hours PRN    Anticoagulation:  heparin  Injectable 5000 Unit(s) SubCutaneous every 8 hours    OTHER:  BACItracin   Ointment 1 Application(s) Topical two times a day  docusate sodium 100 milliGRAM(s) Oral three times a day  levothyroxine 75 MICROGram(s) Oral daily  metoclopramide 10 milliGRAM(s) Oral every 6 hours  montelukast 10 milliGRAM(s) Oral daily  pantoprazole    Tablet 40 milliGRAM(s) Oral two times a day  predniSONE   Tablet 10 milliGRAM(s) Oral daily  senna 2 Tablet(s) Oral at bedtime    Assessment:  As above    Plan:  Increase Lidoderm patches to 2  Pain Meds PRN  PT/Rehab  SW for D/C planning
POD # 12    S/P T9, T12, L1-3 Kyphoplasties    Pt seen and examined at bedside. Pt c/o right flank pain. Sts lidoderm patches give significant relief of pain. Pre-op back pain still improved. Denies Lower extremity radiculopathy, parasthesias. C/o bilateral thigh soreness.      Vital Signs Last 24 Hrs  T(C): 36.2 (18 Apr 2018 07:23), Max: 36.9 (18 Apr 2018 00:10)  T(F): 97.2 (18 Apr 2018 07:23), Max: 98.4 (18 Apr 2018 00:10)  HR: 85 (18 Apr 2018 07:23) (81 - 88)  BP: 105/53 (18 Apr 2018 07:23) (105/53 - 120/68)  BP(mean): --  RR: 18 (18 Apr 2018 07:23) (18 - 19)  SpO2: --    PHYSICAL EXAM:  Strength 5/5   Sensation intact to light touch  No clonus    MEDICATIONS:  Antibiotics:  trimethoprim  160 mG/sulfamethoxazole 800 mG 1 Tablet(s) Oral two times a day    Neuro:  acetaminophen   Tablet 650 milliGRAM(s) Oral every 4 hours PRN  acetaminophen  IVPB. 1000 milliGRAM(s) IV Intermittent once  carbidopa/levodopa  10/100 1 Tablet(s) Oral three times a day  diphenhydrAMINE   Capsule 25 milliGRAM(s) Oral every 4 hours PRN  fentaNYL   Patch  12 MICROgram(s)/Hr 1 Patch Transdermal every 72 hours  methocarbamol 750 milliGRAM(s) Oral every 6 hours  morphine ER Tablet 30 milliGRAM(s) Oral two times a day  morphine ER Tablet 15 milliGRAM(s) Oral every 12 hours PRN  ondansetron Injectable 4 milliGRAM(s) IV Push every 6 hours PRN  oxyCODONE    5 mG/acetaminophen 325 mG 1 Tablet(s) Oral every 4 hours PRN    Anticoagulation:  heparin  Injectable 5000 Unit(s) SubCutaneous every 8 hours    OTHER:  BACItracin   Ointment 1 Application(s) Topical two times a day  docusate sodium 100 milliGRAM(s) Oral three times a day  levothyroxine 75 MICROGram(s) Oral daily  lidocaine   Patch 3 Patch Transdermal daily  metoclopramide 10 milliGRAM(s) Oral every 6 hours  montelukast 10 milliGRAM(s) Oral daily  pantoprazole    Tablet 40 milliGRAM(s) Oral two times a day  predniSONE   Tablet 10 milliGRAM(s) Oral daily  senna 2 Tablet(s) Oral at bedtime    Assessment:  As above    Plan:  Increase patches to 3.   Can D/C to [place] today
POD # 2    S/P Thoracic 9,12 Lumbar 1,2,3 Kyphoplasty  Pt seen and examined at bedside  Pt reports significant improvement in B/L L/E pain       Vital Signs Last 24 Hrs  T(C): 36.3 (2018 07:30), Max: 37.4 (2018 23:49)  T(F): 97.3 (2018 07:30), Max: 99.4 (2018 23:49)  HR: 92 (2018 07:30) (89 - 107)  BP: 130/61 (2018 07:30) (120/63 - 184/80)  BP(mean): --  RR: 18 (2018 07:30) (18 - 20)  SpO2: 92% (2018 21:30) (92% - 92%)    PHYSICAL EXAM:   A&Ox3 NAD speech clear  PERRLA EOMI  Incision C/D/I  Motor strength 5/5x4  sensory intact to L/T  symetrcal DTRs          MEDICATIONS:  Antibiotics:    Neuro:  acetaminophen   Tablet 650 milliGRAM(s) Oral every 4 hours PRN  acetaminophen  IVPB. 1000 milliGRAM(s) IV Intermittent once  carbidopa/levodopa  10/100 1 Tablet(s) Oral three times a day  fentaNYL   Patch  12 MICROgram(s)/Hr 1 Patch Transdermal every 72 hours  methocarbamol 750 milliGRAM(s) Oral every 8 hours  methocarbamol 750 milliGRAM(s) Oral every 8 hours PRN  morphine  - Injectable 4 milliGRAM(s) IV Push every 3 hours PRN  morphine ER Tablet 30 milliGRAM(s) Oral two times a day  morphine PCA (1 mG/mL) 30 milliLiter(s) PCA Continuous PCA Continuous  ondansetron Injectable 4 milliGRAM(s) IV Push every 6 hours PRN    Anticoagulation:    OTHER:  docusate sodium 100 milliGRAM(s) Oral three times a day  levothyroxine 75 MICROGram(s) Oral daily  montelukast 10 milliGRAM(s) Oral daily  predniSONE   Tablet 10 milliGRAM(s) Oral daily  senna 2 Tablet(s) Oral at bedtime    IVF:  sodium chloride 0.9%. 1000 milliLiter(s) IV Continuous <Continuous>          Urinalysis Basic - ( 2018 13:31 )    Color: Yellow / Appearance: Clear / S.010 / pH: x  Gluc: x / Ketone: Trace  / Bili: Negative / Urobili: 0.2 mg/dL   Blood: x / Protein: Negative mg/dL / Nitrite: Negative   Leuk Esterase: Trace / RBC: 1-2 /HPF / WBC 3-5 /HPF   Sq Epi: x / Non Sq Epi: x / Bacteria: x          Assessment: s/p  Multilevel kyphoplasty    Stable  Pain Control  Serial Neuro checks  PT/Rehab  Abx for UTI  D/W attending
POD # 3    S/P T9, 12 L1-3 Kyphoplasties    Pt seen and examined at bedside. Pt c/o R hip/iliac pain. Denies Lower extremity radiculopathy, parasthesias. Sts improvement in her pre-op back pain.    Vital Signs Last 24 Hrs  T(C): 37.2 (2018 07:38), Max: 37.2 (2018 18:00)  T(F): 98.9 (2018 07:38), Max: 99 (2018 18:00)  HR: 88 (2018 07:38) (84 - 101)  BP: 145/67 (2018 07:38) (139/66 - 170/80)  BP(mean): --  RR: 16 (2018 07:38) (16 - 18)  SpO2: 94% (2018 18:00) (94% - 94%)    PHYSICAL EXAM:  Strength 5/5  Sensation slightly decreased to light touch LLE compared to RLE proximally  KJ decreased B/L  No clonus  +Ecchymosis to incision areas    MEDICATIONS:  Antibiotics:  trimethoprim  160 mG/sulfamethoxazole 800 mG 1 Tablet(s) Oral two times a day    Neuro:  acetaminophen   Tablet 650 milliGRAM(s) Oral every 4 hours PRN  acetaminophen  IVPB. 1000 milliGRAM(s) IV Intermittent once  carbidopa/levodopa  10/100 1 Tablet(s) Oral three times a day  fentaNYL   Patch  12 MICROgram(s)/Hr 1 Patch Transdermal every 72 hours  methocarbamol 750 milliGRAM(s) Oral every 8 hours  methocarbamol 750 milliGRAM(s) Oral every 8 hours PRN  morphine  - Injectable 4 milliGRAM(s) IV Push every 3 hours PRN  morphine  IR 15 milliGRAM(s) Oral daily  morphine ER Tablet 30 milliGRAM(s) Oral two times a day  morphine PCA (1 mG/mL) 30 milliLiter(s) PCA Continuous PCA Continuous  ondansetron Injectable 4 milliGRAM(s) IV Push every 6 hours PRN    Anticoagulation:  heparin  Injectable 5000 Unit(s) SubCutaneous every 8 hours    OTHER:  docusate sodium 100 milliGRAM(s) Oral three times a day  levothyroxine 75 MICROGram(s) Oral daily  montelukast 10 milliGRAM(s) Oral daily  predniSONE   Tablet 10 milliGRAM(s) Oral daily  senna 2 Tablet(s) Oral at bedtime    IVF:  sodium chloride 0.9%. 1000 milliLiter(s) IV Continuous <Continuous>    Urinalysis Basic - ( 2018 13:31 )    Color: Yellow / Appearance: Clear / S.010 / pH: x  Gluc: x / Ketone: Trace  / Bili: Negative / Urobili: 0.2 mg/dL   Blood: x / Protein: Negative mg/dL / Nitrite: Negative   Leuk Esterase: Trace / RBC: 1-2 /HPF / WBC 3-5 /HPF   Sq Epi: x / Non Sq Epi: x / Bacteria: x    Assessment:  As above    Plan:  SQ Hep  Venous Dopplers  MS IR 15mg q24hrs @ 12:30.   PT/Rehab  Attempting to wean PCA  Increase Robaxin to 750mg q6 hrs
POD # 5    S/P T9, 12 L1-3 Kyphoplasties    Pt seen and examined at bedside. Pt c/o incisional pain. Sts improved from yesterday. Also sts her nausea is better.    Vital Signs Last 24 Hrs  T(C): 36.7 (12 Apr 2018 08:17), Max: 37.6 (11 Apr 2018 23:43)  T(F): 98 (12 Apr 2018 08:17), Max: 99.6 (11 Apr 2018 23:43)  HR: 82 (12 Apr 2018 08:17) (82 - 90)  BP: 132/59 (12 Apr 2018 08:17) (115/51 - 132/59)  BP(mean): --  RR: 16 (12 Apr 2018 08:17) (16 - 19)  SpO2: 96% (12 Apr 2018 11:16) (96% - 96%)    PHYSICAL EXAM:  Strength 5/5  Sens decr to LT LLE compared to RLE  No clonus  Incisions C/D/I    MEDICATIONS:  Antibiotics:  trimethoprim  160 mG/sulfamethoxazole 800 mG 1 Tablet(s) Oral two times a day    Neuro:  acetaminophen   Tablet 650 milliGRAM(s) Oral every 4 hours PRN  acetaminophen  IVPB. 1000 milliGRAM(s) IV Intermittent once  carbidopa/levodopa  10/100 1 Tablet(s) Oral three times a day  diphenhydrAMINE   Capsule 25 milliGRAM(s) Oral every 4 hours PRN  fentaNYL   Patch  12 MICROgram(s)/Hr 1 Patch Transdermal every 72 hours  methocarbamol 750 milliGRAM(s) Oral every 6 hours  morphine  - Injectable 4 milliGRAM(s) IV Push every 3 hours PRN  morphine ER Tablet 15 milliGRAM(s) Oral <User Schedule> PRN  morphine ER Tablet 30 milliGRAM(s) Oral two times a day  ondansetron Injectable 4 milliGRAM(s) IV Push every 6 hours PRN    Anticoagulation:  heparin  Injectable 5000 Unit(s) SubCutaneous every 8 hours    OTHER:  BACItracin   Ointment 1 Application(s) Topical two times a day  docusate sodium 100 milliGRAM(s) Oral three times a day  levothyroxine 75 MICROGram(s) Oral daily  metoclopramide 10 milliGRAM(s) Oral every 6 hours  montelukast 10 milliGRAM(s) Oral daily  pantoprazole    Tablet 40 milliGRAM(s) Oral two times a day  predniSONE   Tablet 10 milliGRAM(s) Oral daily  senna 2 Tablet(s) Oral at bedtime      LABS:                        10.1   7.50  )-----------( 152      ( 11 Apr 2018 08:10 )             30.8     04-11    139  |  103  |  6<L>  ----------------------------<  118<H>  4.0   |  24  |  0.8    Ca    6.6<L>      11 Apr 2018 08:10    TPro  5.5<L>  /  Alb  3.5  /  TBili  0.8  /  DBili  0.3<H>  /  AST  35  /  ALT  8   /  AlkPhos  86  04-11    Assessment:  As above     Plan:  Pulmonary Consult - Dr. Marshall - pts request
POD # 5    S/P T9, 12 L1-3 Kyphoplasties    Pt seen and examined at bedside. still Pt c/o R hip/iliac pain. Denies Lower extremity radiculopathy, parasthesias. Sts improvement in her pre-op back pain.     Vital Signs Last 24 Hrs  T(C): 36.6 (11 Apr 2018 08:02), Max: 36.8 (10 Apr 2018 16:23)  T(F): 97.9 (11 Apr 2018 08:02), Max: 98.2 (10 Apr 2018 16:23)  HR: 86 (11 Apr 2018 08:02) (73 - 87)  BP: 129/54 (11 Apr 2018 08:02) (123/59 - 144/62)  BP(mean): --  RR: 20 (11 Apr 2018 08:02) (18 - 20)  SpO2: --    PHYSICAL EXAM:  Strength 5/5  Sens decr to LT LLE compared to RLE  No clonus  Incisions C/D/I    MEDICATIONS:  Antibiotics:  trimethoprim  160 mG/sulfamethoxazole 800 mG 1 Tablet(s) Oral two times a day    Neuro:  acetaminophen   Tablet 650 milliGRAM(s) Oral every 4 hours PRN  acetaminophen  IVPB. 1000 milliGRAM(s) IV Intermittent once  carbidopa/levodopa  10/100 1 Tablet(s) Oral three times a day  diphenhydrAMINE   Capsule 25 milliGRAM(s) Oral every 4 hours PRN  fentaNYL   Patch  12 MICROgram(s)/Hr 1 Patch Transdermal every 72 hours  methocarbamol 750 milliGRAM(s) Oral every 6 hours  morphine  - Injectable 4 milliGRAM(s) IV Push every 3 hours PRN  morphine ER Tablet 15 milliGRAM(s) Oral <User Schedule> PRN  morphine ER Tablet 30 milliGRAM(s) Oral two times a day  ondansetron Injectable 4 milliGRAM(s) IV Push every 6 hours PRN    Anticoagulation:  heparin  Injectable 5000 Unit(s) SubCutaneous every 8 hours    OTHER:  BACItracin   Ointment 1 Application(s) Topical two times a day  docusate sodium 100 milliGRAM(s) Oral three times a day  levothyroxine 75 MICROGram(s) Oral daily  metoclopramide 10 milliGRAM(s) Oral every 6 hours  montelukast 10 milliGRAM(s) Oral daily  pantoprazole    Tablet 40 milliGRAM(s) Oral two times a day  predniSONE   Tablet 10 milliGRAM(s) Oral daily  senna 2 Tablet(s) Oral at bedtime    LABS:                        10.1   7.50  )-----------( 152      ( 11 Apr 2018 08:10 )             30.8     04-11    139  |  103  |  6<L>  ----------------------------<  118<H>  4.0   |  24  |  0.8    Ca    6.6<L>      11 Apr 2018 08:10    TPro  5.5<L>  /  Alb  3.5  /  TBili  0.8  /  DBili  0.3<H>  /  AST  35  /  ALT  8   /  AlkPhos  86  04-11    RADIOLOGY:  < from: US Abdomen Limited (04.10.18 @ 21:36) >    Fatty liver. No mass or ductal dilatation.    Gallstones.    < from: [facility] Duplex Lower Ext Vein Scan, Bilat (04.09.18 @ 15:59) >  No evidence of deep venous thrombosis or superficial thrombophlebitis in   bilateral lower extremities.    Assessment:  As above    Plan:  Pain meds PRN  PT/Rehab
POD # 7    S/P kyphoplasties      Vital Signs Last 24 Hrs  T(C): 36.9 (14 Apr 2018 07:57), Max: 37 (14 Apr 2018 00:39)  T(F): 98.5 (14 Apr 2018 07:57), Max: 98.6 (14 Apr 2018 00:39)  HR: 83 (14 Apr 2018 07:57) (78 - 83)  BP: 109/66 (14 Apr 2018 07:57) (109/66 - 140/66)  BP(mean): --  RR: 16 (14 Apr 2018 07:57) (16 - 18)  SpO2: --    PHYSICAL EXAM: Intact, back pain better, right lower chest/flank pain also improving      MEDICATIONS:  Antibiotics:  trimethoprim  160 mG/sulfamethoxazole 800 mG 1 Tablet(s) Oral two times a day    Neuro:  acetaminophen   Tablet 650 milliGRAM(s) Oral every 4 hours PRN  acetaminophen  IVPB. 1000 milliGRAM(s) IV Intermittent once  carbidopa/levodopa  10/100 1 Tablet(s) Oral three times a day  diphenhydrAMINE   Capsule 25 milliGRAM(s) Oral every 4 hours PRN  methocarbamol 750 milliGRAM(s) Oral every 6 hours  morphine ER Tablet 15 milliGRAM(s) Oral <User Schedule> PRN  ondansetron Injectable 4 milliGRAM(s) IV Push every 6 hours PRN  oxyCODONE    5 mG/acetaminophen 325 mG 1 Tablet(s) Oral every 4 hours PRN    Anticoagulation:  heparin  Injectable 5000 Unit(s) SubCutaneous every 8 hours    OTHER:  BACItracin   Ointment 1 Application(s) Topical two times a day  docusate sodium 100 milliGRAM(s) Oral three times a day  levothyroxine 75 MICROGram(s) Oral daily  metoclopramide 10 milliGRAM(s) Oral every 6 hours  montelukast 10 milliGRAM(s) Oral daily  pantoprazole    Tablet 40 milliGRAM(s) Oral two times a day  predniSONE   Tablet 10 milliGRAM(s) Oral daily  senna 2 Tablet(s) Oral at bedtime    RADIOLOGY:  CT chest report pending      Assessment: s/p kyphoplasties with improved back pain.  right chest/flank pain likely myofascial.  CT chest report pending and f/u plan per pulmonary.  rehab eval and hopefully candidate for rehab on monday      Plan: as above
Subjective: 77yFemale with a pmhx of S32.000A,S22.000A,[id number],[id number]  ^S32.000A,S22.000A,55298,31743,22515X2  Handoff  Seasonal allergies  DWAYNE on CPAP  Obesity  Osteoarthritis  Rheumatoid arthritis  Fibromyalgia  Tremors of nervous system  Psoriatic arthritis  GERD (gastroesophageal reflux disease)  Hypothyroidism  Compression fracture of lumbar spine, non-traumatic  Compression fracture of body of thoracic vertebra  Osteoporosis  Compression fracture of lumbar spine, non-traumatic  Compression fracture of body of thoracic vertebra  Osteoporosis  Kyphoplasty at multiple levels  Ankle deformity  H/O gastric bypass    POD # 9    S/P T9, 12 L1-3 Kyphoplasties    Pt seen and examined at bedside. Pt c/o right flank pain that is improved with lidocaine patch.     T(C): 35.4 (04-16-18 @ 07:44), Max: 36.3 (04-15-18 @ 23:57)  HR: 85 (04-16-18 @ 07:44) (81 - 85)  BP: 96/55 (04-16-18 @ 07:44) (96/55 - 133/66)  RR: 18 (04-16-18 @ 07:44) (18 - 18)    Exam:  Strength 5/5  sensation intact b/l  No clonus  Incisions C/D/I      Assessment/Plan: as above  renewed fentanyl patch for muscular pain  pt rehab  recs per pulm including 2decho  d/w attending
YONATAN MEADE  77y, Female  Allergy: adhesives (Pruritus; Rash)  Betadine (Flushing (Skin); Pruritus)  NSAIDs (Vomiting; Rash; Nausea)    PMH/PSH:  PAST MEDICAL & SURGICAL HISTORY:  Seasonal allergies  DWAYNE on CPAP  Obesity: s/p gastric bypass ~15 yrs ago ~100 lb loss  Osteoarthritis  Rheumatoid arthritis  Fibromyalgia  Tremors of nervous system: essential tremors  Psoriatic arthritis  GERD (gastroesophageal reflux disease)  Hypothyroidism: no recent dosage changes  Ankle deformity: s/p b/l surgical repair  H/O gastric bypass    ALLERGIES:  Allergies    adhesives (Pruritus; Rash)  Betadine (Flushing (Skin); Pruritus)  NSAIDs (Vomiting; Rash; Nausea)    Intolerances            LAST 24-Hr EVENTS:  pt seen examined around 3 pm  family at bedside  no cough no sob no chest pain    VITALS:  T(F): 98.7 (04-16-18 @ 16:30), Max: 98.7 (04-16-18 @ 16:30)  HR: 85 (04-16-18 @ 16:30)  BP: 138/63 (04-16-18 @ 16:30) (96/55 - 138/63)  RR: 18 (04-16-18 @ 16:30)  SpO2: --    PHYSICAL EXAM:    GENERAL: NAD  NECK: Supple, No JVD  CHEST/LUNG: CTA bilaterally; No wheeze, No ronchi, No crackles  HEART: Regular rate and rhythm; No murmurs.  ABDOMEN: Soft, Nontender, Nondistended; Bowel sounds present  EXTREMITIES:  No clubbing, edema absent        TESTS & MEASUREMENTS:    IN: 0 mL / OUT: 7 mL / NET: -7 mL                        Culture - Urine (collected 04-14-18 @ 15:19)  Source: .Urine Clean Catch (Midstream)  Final Report (04-16-18 @ 23:16):    10,000 - 49,000 CFU/mL Enterococcus faecalis  Organism: Enterococcus faecalis (04-16-18 @ 23:16)  Organism: Enterococcus faecalis (04-16-18 @ 23:16)      -  Ampicillin: S <=2      -  Ciprofloxacin: R >2      -  Nitrofurantoin: S <=32      -  Tetra/Doxy: R >8      -  Vancomycin: S 2      Method Type: MARC          ABG & VENT SETTINGS: (when applicable)        RADIOLOGY & ADDITIONAL TESTS:    < from: CT Chest w/ IV Cont (04.13.18 @ 21:50) >  Findings:    The heart size is within normal limits. Aortic and mitral valve   calcifications are seen. There are coronary artery calcifications. The   aorta is normal in caliber and contour. There is mild atherosclerosis of   the aortic arch.    No suspicious mediastinal or hilar lymphadenopathy is seen. No axillary   lymphadenopathy is identified.    Multiple tubular areas of hyperdensity are noted within the right upper   lobe pulmonary vasculature for example series 5 image 101 and series 5   image 92. These areas of hyperdensity are predominantly at branch points.   No distal distal airspace opacity is seen.    The trachea and endobronchial tree are patent without opacification. No   focal airspace opacity, pleural effusion or pneumothorax is seen. No   suspicious pulmonary nodules are identified. There is mild linear   atelectasis in the right lung base.    Surgical clips are noted in the left upper quadrant. Nodularity of the   left adrenal gland is seen. Limited evaluation of the remaining   visualized upper abdominal structures is unremarkable.    The patient is again noted to be status post vertebroplasty of the T9,   T12 and L1 vertebral bodies. Small amount of cement material is seen in   the left paravertebral space at T12 and the right anterior prevertebral   space at T9.    Impression:    Multiple small branching tubular hyperdensities within the right upper   lobe pulmonary vasculature compatible with cement emboli from recent   vertebroplasty. No distal air space opacity identified.    < end of copied text >        MEDICATIONS:  MEDICATIONS  (STANDING):  acetaminophen  IVPB. 1000 milliGRAM(s) IV Intermittent once  BACItracin   Ointment 1 Application(s) Topical two times a day  carbidopa/levodopa  10/100 1 Tablet(s) Oral three times a day  docusate sodium 100 milliGRAM(s) Oral three times a day  fentaNYL   Patch  12 MICROgram(s)/Hr 1 Patch Transdermal every 72 hours  heparin  Injectable 5000 Unit(s) SubCutaneous every 8 hours  levothyroxine 75 MICROGram(s) Oral daily  lidocaine   Patch 1 Patch Transdermal daily  methocarbamol 750 milliGRAM(s) Oral every 6 hours  metoclopramide 10 milliGRAM(s) Oral every 6 hours  montelukast 10 milliGRAM(s) Oral daily  morphine ER Tablet 30 milliGRAM(s) Oral two times a day  pantoprazole    Tablet 40 milliGRAM(s) Oral two times a day  predniSONE   Tablet 10 milliGRAM(s) Oral daily  senna 2 Tablet(s) Oral at bedtime  trimethoprim  160 mG/sulfamethoxazole 800 mG 1 Tablet(s) Oral two times a day    MEDICATIONS  (PRN):  acetaminophen   Tablet 650 milliGRAM(s) Oral every 4 hours PRN For Temp greater than 38 C (100.4 F)  diphenhydrAMINE   Capsule 25 milliGRAM(s) Oral every 4 hours PRN Rash and/or Itching  morphine ER Tablet 15 milliGRAM(s) Oral every 12 hours PRN severe breakthrough pain  ondansetron Injectable 4 milliGRAM(s) IV Push every 6 hours PRN Nausea  oxyCODONE    5 mG/acetaminophen 325 mG 1 Tablet(s) Oral every 4 hours PRN Severe Pain (7 - 10)
YONATAN MEADE  77y, Female  Allergy: adhesives (Pruritus; Rash)  Betadine (Flushing (Skin); Pruritus)  NSAIDs (Vomiting; Rash; Nausea)    PMH/PSH:  PAST MEDICAL & SURGICAL HISTORY:  Seasonal allergies  DWAYNE on CPAP  Obesity: s/p gastric bypass ~15 yrs ago ~100 lb loss  Osteoarthritis  Rheumatoid arthritis  Fibromyalgia  Tremors of nervous system: essential tremors  Psoriatic arthritis  GERD (gastroesophageal reflux disease)  Hypothyroidism: no recent dosage changes  Ankle deformity: s/p b/l surgical repair  H/O gastric bypass    ALLERGIES:  Allergies    adhesives (Pruritus; Rash)  Betadine (Flushing (Skin); Pruritus)  NSAIDs (Vomiting; Rash; Nausea)    Intolerances          LAST 24-Hr EVENTS:  denies sob or cough  no overnight events    VITALS:  T(F): 98 (18 @ 15:53), Max: 98.6 (18 @ 00:39)  HR: 90 (18 @ 15:53)  BP: 98/48 (18 @ 15:53) (98/48 - 140/66)  RR: 18 (18 @ 15:53)  SpO2: --    PHYSICAL EXAM:    GENERAL: NAD, well-developed  NECK: Supple, No JVD  CHEST/LUNG: CTA bilaterally; No wheeze, No ronchi, No crackles  HEART: Regular rate and rhythm; No murmurs.  ABDOMEN: Soft, Nontender, Nondistended; Bowel sounds present  EXTREMITIES:  No clubbing, edema absent        TESTS & MEASUREMENTS:    IN: 150 mL / OUT: 505 mL / NET: -355 mL    IN: 0 mL / OUT: 4 mL / NET: -4 mL    IN: 0 mL / OUT: 2 mL / NET: -2 mL                        Urinalysis Basic - ( 2018 15:20 )    Color: Yellow / Appearance: Clear / S.010 / pH: x  Gluc: x / Ketone: Negative  / Bili: Negative / Urobili: 1.0 mg/dL   Blood: x / Protein: Negative mg/dL / Nitrite: Negative   Leuk Esterase: Trace / RBC: x / WBC 1-2 /HPF   Sq Epi: x / Non Sq Epi: Moderate /HPF / Bacteria: Few /HPF        ABG & VENT SETTINGS: (when applicable)        RADIOLOGY & ADDITIONAL TESTS:    ct chest - pending results    MEDICATIONS:  MEDICATIONS  (STANDING):  acetaminophen  IVPB. 1000 milliGRAM(s) IV Intermittent once  BACItracin   Ointment 1 Application(s) Topical two times a day  carbidopa/levodopa  10/100 1 Tablet(s) Oral three times a day  docusate sodium 100 milliGRAM(s) Oral three times a day  heparin  Injectable 5000 Unit(s) SubCutaneous every 8 hours  levothyroxine 75 MICROGram(s) Oral daily  lidocaine   Patch 1 Patch Transdermal daily  methocarbamol 750 milliGRAM(s) Oral every 6 hours  metoclopramide 10 milliGRAM(s) Oral every 6 hours  montelukast 10 milliGRAM(s) Oral daily  pantoprazole    Tablet 40 milliGRAM(s) Oral two times a day  predniSONE   Tablet 10 milliGRAM(s) Oral daily  senna 2 Tablet(s) Oral at bedtime  trimethoprim  160 mG/sulfamethoxazole 800 mG 1 Tablet(s) Oral two times a day    MEDICATIONS  (PRN):  acetaminophen   Tablet 650 milliGRAM(s) Oral every 4 hours PRN For Temp greater than 38 C (100.4 F)  diphenhydrAMINE   Capsule 25 milliGRAM(s) Oral every 4 hours PRN Rash and/or Itching  morphine ER Tablet 15 milliGRAM(s) Oral <User Schedule> PRN break through pain  ondansetron Injectable 4 milliGRAM(s) IV Push every 6 hours PRN Nausea  oxyCODONE    5 mG/acetaminophen 325 mG 1 Tablet(s) Oral every 4 hours PRN Severe Pain (7 - 10)
YONATAN MEADE  77y, Female  Allergy: adhesives (Pruritus; Rash)  Betadine (Flushing (Skin); Pruritus)  NSAIDs (Vomiting; Rash; Nausea)    PMH/PSH:  PAST MEDICAL & SURGICAL HISTORY:  Seasonal allergies  DWAYNE on CPAP  Obesity: s/p gastric bypass ~15 yrs ago ~100 lb loss  Osteoarthritis  Rheumatoid arthritis  Fibromyalgia  Tremors of nervous system: essential tremors  Psoriatic arthritis  GERD (gastroesophageal reflux disease)  Hypothyroidism: no recent dosage changes  Ankle deformity: s/p b/l surgical repair  H/O gastric bypass    ALLERGIES:  Allergies    adhesives (Pruritus; Rash)  Betadine (Flushing (Skin); Pruritus)  NSAIDs (Vomiting; Rash; Nausea)    Intolerances        LAST 24-Hr EVENTS:  pt seen earlier this am  laying in bed comfortable no sob no chest pain    VITALS:  T(F): 96.4 (04-15-18 @ 16:06), Max: 98.7 (18 @ 23:53)  HR: 84 (04-15-18 @ 16:06)  BP: 133/66 (04-15-18 @ 16:06) (127/58 - 154/66)  RR: 18 (04-15-18 @ 16:06)  SpO2: --    PHYSICAL EXAM:    GENERAL: NAD, well-developed  NECK: Supple, No JVD  CHEST/LUNG: CTA bilaterally; No wheeze, No ronchi, No crackles  HEART: Regular rate and rhythm; No murmurs.  ABDOMEN: Soft, Nontender, Nondistended; Bowel sounds present  EXTREMITIES:  No clubbing, edema absent        TESTS & MEASUREMENTS:    IN: 0 mL / OUT: 4 mL / NET: -4 mL    IN: 0 mL / OUT: 7 mL / NET: -7 mL                        Urinalysis Basic - ( 2018 15:20 )    Color: Yellow / Appearance: Clear / S.010 / pH: x  Gluc: x / Ketone: Negative  / Bili: Negative / Urobili: 1.0 mg/dL   Blood: x / Protein: Negative mg/dL / Nitrite: Negative   Leuk Esterase: Trace / RBC: x / WBC 1-2 /HPF   Sq Epi: x / Non Sq Epi: Moderate /HPF / Bacteria: Few /HPF        ABG & VENT SETTINGS: (when applicable)        RADIOLOGY & ADDITIONAL TESTS:  < from: CT Chest w/ IV Cont (18 @ 21:50) >  Correlation with radiograph of the chest 4/10/2018 and CT of the thoracic   spine 2018.    Findings:    The heart size is within normal limits. Aortic and mitral valve   calcifications are seen. There are coronary artery calcifications. The   aorta is normal in caliber and contour. There is mild atherosclerosis of   the aortic arch.    No suspicious mediastinal or hilar lymphadenopathy is seen. No axillary   lymphadenopathy is identified.    Multiple tubular areas of hyperdensity are noted within the right upper   lobe pulmonary vasculature for example series 5 image 101 and series 5   image 92. These areas of hyperdensity are predominantly at branch points.   No distal distal airspace opacity is seen.    The trachea and endobronchial tree are patent without opacification. No   focal airspace opacity, pleural effusion or pneumothorax is seen. No   suspicious pulmonary nodules are identified. There is mild linear   atelectasis in the right lung base.    Surgical clips are noted in the left upper quadrant. Nodularity of the   left adrenal gland is seen. Limited evaluation of the remaining   visualized upper abdominal structures is unremarkable.    The patient is again noted to be status post vertebroplasty of the T9,   T12 and L1 vertebral bodies. Small amount of cement material is seen in   the left paravertebral space at T12 and the right anterior prevertebral   space at T9.    Impression:    Multiple small branching tubular hyperdensities within the right upper   lobe pulmonary vasculature compatible with cement emboli from recent   vertebroplasty. No distal air space opacity identified.    < end of copied text >        MEDICATIONS:  MEDICATIONS  (STANDING):  acetaminophen  IVPB. 1000 milliGRAM(s) IV Intermittent once  BACItracin   Ointment 1 Application(s) Topical two times a day  carbidopa/levodopa  10/100 1 Tablet(s) Oral three times a day  docusate sodium 100 milliGRAM(s) Oral three times a day  heparin  Injectable 5000 Unit(s) SubCutaneous every 8 hours  levothyroxine 75 MICROGram(s) Oral daily  lidocaine   Patch 1 Patch Transdermal daily  methocarbamol 750 milliGRAM(s) Oral every 6 hours  metoclopramide 10 milliGRAM(s) Oral every 6 hours  montelukast 10 milliGRAM(s) Oral daily  morphine ER Tablet 30 milliGRAM(s) Oral two times a day  pantoprazole    Tablet 40 milliGRAM(s) Oral two times a day  predniSONE   Tablet 10 milliGRAM(s) Oral daily  senna 2 Tablet(s) Oral at bedtime  trimethoprim  160 mG/sulfamethoxazole 800 mG 1 Tablet(s) Oral two times a day    MEDICATIONS  (PRN):  acetaminophen   Tablet 650 milliGRAM(s) Oral every 4 hours PRN For Temp greater than 38 C (100.4 F)  diphenhydrAMINE   Capsule 25 milliGRAM(s) Oral every 4 hours PRN Rash and/or Itching  morphine ER Tablet 15 milliGRAM(s) Oral <User Schedule> PRN break through pain  ondansetron Injectable 4 milliGRAM(s) IV Push every 6 hours PRN Nausea  oxyCODONE    5 mG/acetaminophen 325 mG 1 Tablet(s) Oral every 4 hours PRN Severe Pain (7 - 10)

## 2018-04-18 NOTE — DISCHARGE NOTE ADULT - MEDICATION SUMMARY - MEDICATIONS TO TAKE
I will START or STAY ON the medications listed below when I get home from the hospital:    predniSONE 10 mg oral tablet  -- 1 tab(s) by mouth once a day  -- Indication: For Arthritis    morphine 30 mg oral capsule, extended release  -- orally 2 times a day  -- Indication: For Pain    morphine 15 mg oral tablet  -- orally once a day 15 mg er) @ 4pm  -- Indication: For Pain    fentaNYL 12 mcg/hr transdermal film, extended release  -- 1 patch by transdermal patch every 72 hours  -- Indication: For Pain    Savella 25 mg oral tablet  -- 1 tab(s) by mouth 2 times a day  -- Indication: For Depression    ZyrTEC 10 mg oral tablet  -- 1 tab(s) by mouth once a day  -- Indication: For Allergies    methotrexate 10 mg oral tablet  -- orally once a week (q12h) on tuesdays  -- Indication: For Arthritis    carbidopa-levodopa 10 mg-100 mg oral tablet  -- orally 3 times a day  -- Indication: For Parkinsons Disease    lidocaine 5% topical film  -- Apply on skin to affected area once a day -for moderate pain   -- Indication: For Pain    bacitracin 500 units/g topical ointment  -- 1 application on skin 2 times a day  -- Indication: For Skin irritation    Actemra 20 mg/mL intravenous solution  -- intravenous once a month, last infusion ~2 wks ago  -- Indication: For Arthritis    montelukast 10 mg oral tablet  -- 1 tab(s) by mouth once a day  -- Indication: For COPD    methocarbamol 750 mg oral tablet  -- 1 tab(s) by mouth every 8 hours   -- May cause drowsiness.  Alcohol may intensify this effect.  Use care when operating dangerous machinery.    -- Indication: For Spasm    levothyroxine 75 mcg (0.075 mg) oral tablet  -- 1 tab(s) by mouth once a day  -- Indication: For Hypothyroid

## 2018-04-18 NOTE — DISCHARGE NOTE ADULT - CARE PROVIDER_API CALL
Nadir Garcia), Neurological Surgery  [address] [place] [address]  Phone: [phone]  Fax: [phone]

## 2018-04-18 NOTE — DISCHARGE NOTE ADULT - CARE PLAN
Principal Discharge DX:	Closed compression fracture of lumbosacral spine, initial encounter  Goal:	Pain Relief  Assessment and plan of treatment:	S/P T9, T12, L1-3 Kyphoplasty

## 2018-04-18 NOTE — PROGRESS NOTE ADULT - PROVIDER SPECIALTY LIST ADULT
Internal Medicine
Internal Medicine
Neurosurgery
Pulmonology
Neurosurgery
Neurosurgery

## 2018-04-18 NOTE — DISCHARGE NOTE ADULT - PATIENT PORTAL LINK FT
You can access the [facility] Patient Portal, offered by [facility], by registering with the following website: [email or web address]

## 2018-04-18 NOTE — PROGRESS NOTE ADULT - NSHPATTENDINGPLANDISCUSS_GEN_ALL_CORE
neurosurgery Tanvir Moreno, patient's daughter
HERRERA PA
House staff, family and Patient
PAULA Bender
EHRRERA PA
HERRERA PA

## 2018-04-19 ENCOUNTER — OUTPATIENT (OUTPATIENT)
Dept: OUTPATIENT SERVICES | Facility: HOSPITAL | Age: 78
LOS: 1 days | Discharge: HOME | End: 2018-04-19

## 2018-04-19 VITALS
DIASTOLIC BLOOD PRESSURE: 76 MMHG | HEART RATE: 80 BPM | SYSTOLIC BLOOD PRESSURE: 109 MMHG | TEMPERATURE: 99 F | RESPIRATION RATE: 18 BRPM

## 2018-04-19 DIAGNOSIS — R79.89 OTHER SPECIFIED ABNORMAL FINDINGS OF BLOOD CHEMISTRY: ICD-10-CM

## 2018-04-19 DIAGNOSIS — Z98.84 BARIATRIC SURGERY STATUS: Chronic | ICD-10-CM

## 2018-04-19 DIAGNOSIS — M21.969 UNSPECIFIED ACQUIRED DEFORMITY OF UNSPECIFIED LOWER LEG: Chronic | ICD-10-CM

## 2018-05-02 ENCOUNTER — INPATIENT (INPATIENT)
Facility: HOSPITAL | Age: 78
LOS: 5 days | Discharge: HOME HEALTH | End: 2018-05-08
Attending: INTERNAL MEDICINE | Admitting: INTERNAL MEDICINE

## 2018-05-02 VITALS
DIASTOLIC BLOOD PRESSURE: 54 MMHG | WEIGHT: 181 LBS | HEART RATE: 115 BPM | RESPIRATION RATE: 20 BRPM | SYSTOLIC BLOOD PRESSURE: 115 MMHG | OXYGEN SATURATION: 96 % | TEMPERATURE: 101 F

## 2018-05-02 DIAGNOSIS — E66.9 OBESITY, UNSPECIFIED: ICD-10-CM

## 2018-05-02 DIAGNOSIS — G47.33 OBSTRUCTIVE SLEEP APNEA (ADULT) (PEDIATRIC): ICD-10-CM

## 2018-05-02 DIAGNOSIS — M21.969 UNSPECIFIED ACQUIRED DEFORMITY OF UNSPECIFIED LOWER LEG: Chronic | ICD-10-CM

## 2018-05-02 DIAGNOSIS — Z98.84 BARIATRIC SURGERY STATUS: ICD-10-CM

## 2018-05-02 DIAGNOSIS — A41.51 SEPSIS DUE TO ESCHERICHIA COLI [E. COLI]: ICD-10-CM

## 2018-05-02 DIAGNOSIS — J30.2 OTHER SEASONAL ALLERGIC RHINITIS: ICD-10-CM

## 2018-05-02 DIAGNOSIS — M19.90 UNSPECIFIED OSTEOARTHRITIS, UNSPECIFIED SITE: ICD-10-CM

## 2018-05-02 DIAGNOSIS — M79.7 FIBROMYALGIA: ICD-10-CM

## 2018-05-02 DIAGNOSIS — G25.0 ESSENTIAL TREMOR: ICD-10-CM

## 2018-05-02 DIAGNOSIS — R09.02 HYPOXEMIA: ICD-10-CM

## 2018-05-02 DIAGNOSIS — E83.51 HYPOCALCEMIA: ICD-10-CM

## 2018-05-02 DIAGNOSIS — L40.50 ARTHROPATHIC PSORIASIS, UNSPECIFIED: ICD-10-CM

## 2018-05-02 DIAGNOSIS — K21.9 GASTRO-ESOPHAGEAL REFLUX DISEASE WITHOUT ESOPHAGITIS: ICD-10-CM

## 2018-05-02 DIAGNOSIS — Z98.84 BARIATRIC SURGERY STATUS: Chronic | ICD-10-CM

## 2018-05-02 DIAGNOSIS — R50.9 FEVER, UNSPECIFIED: ICD-10-CM

## 2018-05-02 DIAGNOSIS — E03.9 HYPOTHYROIDISM, UNSPECIFIED: ICD-10-CM

## 2018-05-02 DIAGNOSIS — J98.11 ATELECTASIS: ICD-10-CM

## 2018-05-02 DIAGNOSIS — G20 PARKINSON'S DISEASE: ICD-10-CM

## 2018-05-02 DIAGNOSIS — N39.0 URINARY TRACT INFECTION, SITE NOT SPECIFIED: ICD-10-CM

## 2018-05-02 PROBLEM — Z00.00 ENCOUNTER FOR PREVENTIVE HEALTH EXAMINATION: Status: ACTIVE | Noted: 2018-05-02

## 2018-05-02 LAB
ALBUMIN SERPL ELPH-MCNC: 3.5 G/DL — SIGNIFICANT CHANGE UP (ref 3.5–5.2)
ALLERGY+IMMUNOLOGY DIAG STUDY NOTE: SIGNIFICANT CHANGE UP
ALP SERPL-CCNC: 122 U/L — HIGH (ref 30–115)
ALT FLD-CCNC: <5 U/L — SIGNIFICANT CHANGE UP (ref 0–41)
ANION GAP SERPL CALC-SCNC: 14 MMOL/L — SIGNIFICANT CHANGE UP (ref 7–14)
APPEARANCE UR: (no result)
APTT BLD: 31.1 SEC — SIGNIFICANT CHANGE UP (ref 27–39.2)
AST SERPL-CCNC: 28 U/L — SIGNIFICANT CHANGE UP (ref 0–41)
BACTERIA # UR AUTO: (no result)
BASE EXCESS BLDV CALC-SCNC: 1 MMOL/L — SIGNIFICANT CHANGE UP (ref -2–2)
BASOPHILS # BLD AUTO: 0.05 K/UL — SIGNIFICANT CHANGE UP (ref 0–0.2)
BASOPHILS NFR BLD AUTO: 0.2 % — SIGNIFICANT CHANGE UP (ref 0–1)
BILIRUB DIRECT SERPL-MCNC: 0.4 MG/DL — HIGH (ref 0–0.2)
BILIRUB INDIRECT FLD-MCNC: 0.8 MG/DL — SIGNIFICANT CHANGE UP (ref 0.2–1.2)
BILIRUB SERPL-MCNC: 1.2 MG/DL — SIGNIFICANT CHANGE UP (ref 0.2–1.2)
BILIRUB UR-MCNC: (no result)
BUN SERPL-MCNC: 26 MG/DL — HIGH (ref 10–20)
CA-I SERPL-SCNC: 0.95 MMOL/L — LOW (ref 1.12–1.3)
CALCIUM SERPL-MCNC: 6.8 MG/DL — LOW (ref 8.5–10.1)
CHLORIDE SERPL-SCNC: 100 MMOL/L — SIGNIFICANT CHANGE UP (ref 98–110)
CK SERPL-CCNC: 99 U/L — SIGNIFICANT CHANGE UP (ref 0–225)
CO2 SERPL-SCNC: 25 MMOL/L — SIGNIFICANT CHANGE UP (ref 17–32)
COLOR SPEC: YELLOW — SIGNIFICANT CHANGE UP
CREAT SERPL-MCNC: 1.3 MG/DL — SIGNIFICANT CHANGE UP (ref 0.7–1.5)
DIFF PNL FLD: (no result)
EOSINOPHIL # BLD AUTO: 0 K/UL — SIGNIFICANT CHANGE UP (ref 0–0.7)
EOSINOPHIL NFR BLD AUTO: 0 % — SIGNIFICANT CHANGE UP (ref 0–8)
EPI CELLS # UR: (no result) /HPF
GAS PNL BLDV: 138 MMOL/L — SIGNIFICANT CHANGE UP (ref 136–145)
GAS PNL BLDV: SIGNIFICANT CHANGE UP
GAS PNL BLDV: SIGNIFICANT CHANGE UP
GLUCOSE SERPL-MCNC: 75 MG/DL — SIGNIFICANT CHANGE UP (ref 70–99)
GLUCOSE UR QL: NEGATIVE — SIGNIFICANT CHANGE UP
HCO3 BLDV-SCNC: 26 MMOL/L — SIGNIFICANT CHANGE UP (ref 22–29)
HCT VFR BLD CALC: 34.3 % — LOW (ref 37–47)
HCT VFR BLDA CALC: 47 % — HIGH (ref 34–44)
HGB BLD-MCNC: 11.3 G/DL — LOW (ref 12–16)
IMM GRANULOCYTES NFR BLD AUTO: 2.6 % — HIGH (ref 0.1–0.3)
INR BLD: 1.78 RATIO — HIGH (ref 0.65–1.3)
KETONES UR-MCNC: (no result)
LACTATE BLDV-MCNC: 1.6 MMOL/L — SIGNIFICANT CHANGE UP (ref 0.5–1.6)
LACTATE SERPL-SCNC: 1.9 MMOL/L — SIGNIFICANT CHANGE UP (ref 0.5–2.2)
LEUKOCYTE ESTERASE UR-ACNC: (no result)
LIDOCAIN IGE QN: 7 U/L — SIGNIFICANT CHANGE UP (ref 7–60)
LYMPHOCYTES # BLD AUTO: 0.92 K/UL — LOW (ref 1.2–3.4)
LYMPHOCYTES # BLD AUTO: 3.4 % — LOW (ref 20.5–51.1)
MCHC RBC-ENTMCNC: 32.3 PG — HIGH (ref 27–31)
MCHC RBC-ENTMCNC: 32.9 G/DL — SIGNIFICANT CHANGE UP (ref 32–37)
MCV RBC AUTO: 98 FL — SIGNIFICANT CHANGE UP (ref 81–99)
METAMYELOCYTES # FLD: 2 % — HIGH (ref 0–0)
MONOCYTES # BLD AUTO: 0.39 K/UL — SIGNIFICANT CHANGE UP (ref 0.1–0.6)
MONOCYTES NFR BLD AUTO: 1.5 % — LOW (ref 1.7–9.3)
NEUTROPHILS # BLD AUTO: 24.72 K/UL — HIGH (ref 1.4–6.5)
NEUTROPHILS NFR BLD AUTO: 92.3 % — HIGH (ref 42.2–75.2)
NEUTS BAND # BLD: 6 % — SIGNIFICANT CHANGE UP (ref 0–6)
NITRITE UR-MCNC: NEGATIVE — SIGNIFICANT CHANGE UP
NRBC # BLD: 0 /100 WBCS — SIGNIFICANT CHANGE UP (ref 0–0)
NRBC # BLD: 0 /100 — SIGNIFICANT CHANGE UP (ref 0–0)
PCO2 BLDV: 44 MMHG — SIGNIFICANT CHANGE UP (ref 41–51)
PH BLDV: 7.39 — SIGNIFICANT CHANGE UP (ref 7.26–7.43)
PH UR: 5.5 — SIGNIFICANT CHANGE UP (ref 5–8)
PLAT MORPH BLD: NORMAL — SIGNIFICANT CHANGE UP
PLATELET # BLD AUTO: 168 K/UL — SIGNIFICANT CHANGE UP (ref 130–400)
PO2 BLDV: 43 MMHG — HIGH (ref 20–40)
POTASSIUM BLDV-SCNC: 3.9 MMOL/L — SIGNIFICANT CHANGE UP (ref 3.3–5.6)
POTASSIUM SERPL-MCNC: 4.4 MMOL/L — SIGNIFICANT CHANGE UP (ref 3.5–5)
POTASSIUM SERPL-SCNC: 4.4 MMOL/L — SIGNIFICANT CHANGE UP (ref 3.5–5)
PROT SERPL-MCNC: 6.1 G/DL — SIGNIFICANT CHANGE UP (ref 6–8)
PROT UR-MCNC: >=300 MG/DL — SIGNIFICANT CHANGE UP
PROTHROM AB SERPL-ACNC: 19.5 SEC — HIGH (ref 9.95–12.87)
RBC # BLD: 3.5 M/UL — LOW (ref 4.2–5.4)
RBC # FLD: 16.8 % — HIGH (ref 11.5–14.5)
RBC BLD AUTO: NORMAL — SIGNIFICANT CHANGE UP
RBC CASTS # UR COMP ASSIST: (no result) /HPF
SAO2 % BLDV: 77 % — SIGNIFICANT CHANGE UP
SODIUM SERPL-SCNC: 139 MMOL/L — SIGNIFICANT CHANGE UP (ref 135–146)
SP GR SPEC: >=1.03 (ref 1.01–1.03)
TROPONIN T SERPL-MCNC: <0.01 NG/ML — SIGNIFICANT CHANGE UP
TYPE + AB SCN PNL BLD: SIGNIFICANT CHANGE UP
UROBILINOGEN FLD QL: 0.2 — SIGNIFICANT CHANGE UP (ref 0.2–0.2)
WBC # BLD: 26.78 K/UL — HIGH (ref 4.8–10.8)
WBC # FLD AUTO: 26.78 K/UL — HIGH (ref 4.8–10.8)
WBC UR QL: (no result) /HPF

## 2018-05-02 RX ORDER — BACITRACIN ZINC 500 UNIT/G
1 OINTMENT IN PACKET (EA) TOPICAL
Qty: 0 | Refills: 0 | Status: DISCONTINUED | OUTPATIENT
Start: 2018-05-02 | End: 2018-05-08

## 2018-05-02 RX ORDER — HEPARIN SODIUM 5000 [USP'U]/ML
5000 INJECTION INTRAVENOUS; SUBCUTANEOUS EVERY 12 HOURS
Qty: 0 | Refills: 0 | Status: DISCONTINUED | OUTPATIENT
Start: 2018-05-02 | End: 2018-05-08

## 2018-05-02 RX ORDER — METHOCARBAMOL 500 MG/1
750 TABLET, FILM COATED ORAL THREE TIMES A DAY
Qty: 0 | Refills: 0 | Status: DISCONTINUED | OUTPATIENT
Start: 2018-05-02 | End: 2018-05-08

## 2018-05-02 RX ORDER — MORPHINE SULFATE 50 MG/1
30 CAPSULE, EXTENDED RELEASE ORAL
Qty: 0 | Refills: 0 | Status: DISCONTINUED | OUTPATIENT
Start: 2018-05-02 | End: 2018-05-08

## 2018-05-02 RX ORDER — ACETAMINOPHEN 500 MG
975 TABLET ORAL ONCE
Qty: 0 | Refills: 0 | Status: COMPLETED | OUTPATIENT
Start: 2018-05-02 | End: 2018-05-02

## 2018-05-02 RX ORDER — METHOTREXATE 2.5 MG/1
10 TABLET ORAL
Qty: 0 | Refills: 0 | Status: CANCELLED | OUTPATIENT
Start: 2018-05-15 | End: 2018-05-08

## 2018-05-02 RX ORDER — FENTANYL CITRATE 50 UG/ML
1 INJECTION INTRAVENOUS
Qty: 0 | Refills: 0 | Status: DISCONTINUED | OUTPATIENT
Start: 2018-05-02 | End: 2018-05-08

## 2018-05-02 RX ORDER — OXYCODONE AND ACETAMINOPHEN 5; 325 MG/1; MG/1
1 TABLET ORAL EVERY 4 HOURS
Qty: 0 | Refills: 0 | Status: DISCONTINUED | OUTPATIENT
Start: 2018-05-02 | End: 2018-05-08

## 2018-05-02 RX ORDER — CARBIDOPA AND LEVODOPA 25; 100 MG/1; MG/1
1 TABLET ORAL THREE TIMES A DAY
Qty: 0 | Refills: 0 | Status: DISCONTINUED | OUTPATIENT
Start: 2018-05-02 | End: 2018-05-08

## 2018-05-02 RX ORDER — MORPHINE SULFATE 50 MG/1
15 CAPSULE, EXTENDED RELEASE ORAL EVERY 12 HOURS
Qty: 0 | Refills: 0 | Status: DISCONTINUED | OUTPATIENT
Start: 2018-05-02 | End: 2018-05-08

## 2018-05-02 RX ORDER — LEVOTHYROXINE SODIUM 125 MCG
75 TABLET ORAL DAILY
Qty: 0 | Refills: 0 | Status: DISCONTINUED | OUTPATIENT
Start: 2018-05-02 | End: 2018-05-08

## 2018-05-02 RX ORDER — SODIUM CHLORIDE 9 MG/ML
1000 INJECTION INTRAMUSCULAR; INTRAVENOUS; SUBCUTANEOUS ONCE
Qty: 0 | Refills: 0 | Status: COMPLETED | OUTPATIENT
Start: 2018-05-02 | End: 2018-05-02

## 2018-05-02 RX ORDER — SODIUM CHLORIDE 9 MG/ML
1000 INJECTION INTRAMUSCULAR; INTRAVENOUS; SUBCUTANEOUS
Qty: 0 | Refills: 0 | Status: DISCONTINUED | OUTPATIENT
Start: 2018-05-02 | End: 2018-05-06

## 2018-05-02 RX ORDER — AZITHROMYCIN 500 MG/1
500 TABLET, FILM COATED ORAL ONCE
Qty: 0 | Refills: 0 | Status: COMPLETED | OUTPATIENT
Start: 2018-05-02 | End: 2018-05-02

## 2018-05-02 RX ORDER — MONTELUKAST 4 MG/1
10 TABLET, CHEWABLE ORAL DAILY
Qty: 0 | Refills: 0 | Status: DISCONTINUED | OUTPATIENT
Start: 2018-05-02 | End: 2018-05-08

## 2018-05-02 RX ORDER — CEFEPIME 1 G/1
1000 INJECTION, POWDER, FOR SOLUTION INTRAMUSCULAR; INTRAVENOUS ONCE
Qty: 0 | Refills: 0 | Status: COMPLETED | OUTPATIENT
Start: 2018-05-02 | End: 2018-05-02

## 2018-05-02 RX ORDER — CEFTRIAXONE 500 MG/1
1 INJECTION, POWDER, FOR SOLUTION INTRAMUSCULAR; INTRAVENOUS EVERY 24 HOURS
Qty: 0 | Refills: 0 | Status: DISCONTINUED | OUTPATIENT
Start: 2018-05-02 | End: 2018-05-07

## 2018-05-02 RX ORDER — SENNA PLUS 8.6 MG/1
2 TABLET ORAL AT BEDTIME
Qty: 0 | Refills: 0 | Status: DISCONTINUED | OUTPATIENT
Start: 2018-05-02 | End: 2018-05-08

## 2018-05-02 RX ORDER — SODIUM CHLORIDE 9 MG/ML
3 INJECTION INTRAMUSCULAR; INTRAVENOUS; SUBCUTANEOUS ONCE
Qty: 0 | Refills: 0 | Status: COMPLETED | OUTPATIENT
Start: 2018-05-02 | End: 2018-05-02

## 2018-05-02 RX ORDER — ACETAMINOPHEN 500 MG
325 TABLET ORAL EVERY 4 HOURS
Qty: 0 | Refills: 0 | Status: DISCONTINUED | OUTPATIENT
Start: 2018-05-02 | End: 2018-05-08

## 2018-05-02 RX ORDER — LORATADINE 10 MG/1
10 TABLET ORAL DAILY
Qty: 0 | Refills: 0 | Status: DISCONTINUED | OUTPATIENT
Start: 2018-05-02 | End: 2018-05-08

## 2018-05-02 RX ADMIN — FENTANYL CITRATE 1 PATCH: 50 INJECTION INTRAVENOUS at 17:30

## 2018-05-02 RX ADMIN — MORPHINE SULFATE 30 MILLIGRAM(S): 50 CAPSULE, EXTENDED RELEASE ORAL at 19:09

## 2018-05-02 RX ADMIN — SODIUM CHLORIDE 125 MILLILITER(S): 9 INJECTION INTRAMUSCULAR; INTRAVENOUS; SUBCUTANEOUS at 17:20

## 2018-05-02 RX ADMIN — SODIUM CHLORIDE 125 MILLILITER(S): 9 INJECTION INTRAMUSCULAR; INTRAVENOUS; SUBCUTANEOUS at 12:57

## 2018-05-02 RX ADMIN — MONTELUKAST 10 MILLIGRAM(S): 4 TABLET, CHEWABLE ORAL at 19:07

## 2018-05-02 RX ADMIN — SODIUM CHLORIDE 3 MILLILITER(S): 9 INJECTION INTRAMUSCULAR; INTRAVENOUS; SUBCUTANEOUS at 12:06

## 2018-05-02 RX ADMIN — LORATADINE 10 MILLIGRAM(S): 10 TABLET ORAL at 19:07

## 2018-05-02 RX ADMIN — SODIUM CHLORIDE 125 MILLILITER(S): 9 INJECTION INTRAMUSCULAR; INTRAVENOUS; SUBCUTANEOUS at 17:30

## 2018-05-02 RX ADMIN — CEFTRIAXONE 100 GRAM(S): 500 INJECTION, POWDER, FOR SOLUTION INTRAMUSCULAR; INTRAVENOUS at 19:09

## 2018-05-02 RX ADMIN — MORPHINE SULFATE 30 MILLIGRAM(S): 50 CAPSULE, EXTENDED RELEASE ORAL at 18:07

## 2018-05-02 RX ADMIN — CEFEPIME 100 MILLIGRAM(S): 1 INJECTION, POWDER, FOR SOLUTION INTRAMUSCULAR; INTRAVENOUS at 12:01

## 2018-05-02 RX ADMIN — Medication 975 MILLIGRAM(S): at 12:02

## 2018-05-02 RX ADMIN — AZITHROMYCIN 255 MILLIGRAM(S): 500 TABLET, FILM COATED ORAL at 12:59

## 2018-05-02 RX ADMIN — SODIUM CHLORIDE 1000 MILLILITER(S): 9 INJECTION INTRAMUSCULAR; INTRAVENOUS; SUBCUTANEOUS at 12:00

## 2018-05-02 RX ADMIN — HEPARIN SODIUM 5000 UNIT(S): 5000 INJECTION INTRAVENOUS; SUBCUTANEOUS at 19:07

## 2018-05-02 RX ADMIN — METHOCARBAMOL 750 MILLIGRAM(S): 500 TABLET, FILM COATED ORAL at 21:18

## 2018-05-02 RX ADMIN — CARBIDOPA AND LEVODOPA 1 TABLET(S): 25; 100 TABLET ORAL at 21:18

## 2018-05-02 RX ADMIN — Medication 1 APPLICATION(S): at 19:05

## 2018-05-02 NOTE — H&P ADULT - HISTORY OF PRESENT ILLNESS
This is 78 yo female with PMH of Parkinson's Disease, Hypothyroidism, Severe OA, low back pain, Fibromyalgia was referred from [facility] for fever 101F. She was sent from [facility] two weeks ago for rehab. She was supposed to discharge home today, but she developed fever and weakness. She denies cough, her daughter stated she chocked yesterday when she tried to swallow Tylenol. She denies any acute pain except her chronic skeletal pain. She recently underwent kyphoplasty for spinal fractures.   In the Ed her labs showed leukocytosis, UA mild UTI, CXR left atelectasis.

## 2018-05-02 NOTE — H&P ADULT - NSHPPHYSICALEXAM_GEN_ALL_CORE
PHYSICAL EXAM:  Alert, oriented x3.   HEENT: PERRLA  neck: no JVD  Chest: Clear, no rales or wheezing  Heart" RRR S1 S2 no murmurs  Abdomen" soft, non-tender.   ExT" no edema, pulses are ok b/l.

## 2018-05-02 NOTE — ED PROVIDER NOTE - PROGRESS NOTE DETAILS
I notified neurosurgical pa regarding admission of the patient they will follow and provide reccomendations

## 2018-05-02 NOTE — ED PROVIDER NOTE - ATTENDING CONTRIBUTION TO CARE
I have personally performed a history and physical exam on this patient and personally directed the management of the patient. Patient presents for evaluation of shortness of breath, fever and hypoxia.  staring today thepatient is s/p kyphoplasty was doing well but has developed fevers, chills and hypoxia. she denies any chest pain, at this point she is appropriate.  On physical exam the patient is nc/at perrla eomi oropharynx clear patient has slight b/l wheezing at this time. she is rrr s1s2 noted abd-soft nt nd bS + ext from with no edema noted. she has no signs of redness or infection.    A/P patient was started on iv antibiotics.  she was given IV fluids.  I will admit for further management.  I feel that the patient with her symptoms of hypoxia, fevers, chills and cough is most likely secondary to infectious cause most likely pneumonia.

## 2018-05-02 NOTE — ED PROVIDER NOTE - OBJECTIVE STATEMENT
77 year old female status post kyphoplasty and pneumonia was at SNF and was suppose to be d/c home today. Pt today spiked fever and had low pulse and sent in to rule out pneumonia. patient states she has been coughing non productive and is shortness of breath. no headache. no abdominal pain no nausea, vomiting, diarrhea.

## 2018-05-02 NOTE — H&P ADULT - ASSESSMENT
A/P:   1. Fever:   Etiology is not clear. Possible UTI  Start on Rocephin 1 g daily IV,   Send urine and blood culture   CXR showed left lung atelectasis, patient has no symptoms, consider chest CT if no improvement.   ID consult    2. Chronic Back pain with vertebra fracture   s/p Kyphoplasty April 2018 for T9, T12, L1, L2 and L3  complicated with   Continue with Morphine ER and Fentanyl patch.     3. Psoriatic arthritis:   continue Prednisone and weekly Methotrexate     4. Parkinson's disease:   continue with Sinemet.     DVT PPX: Heparin SC.   GI PPX: PPI.

## 2018-05-02 NOTE — H&P ADULT - NSHPLABSRESULTS_GEN_ALL_CORE
Hematocrit, Calculated: 47.0 % (05.02.18 @ 12:17) Hematocrit, Calculated: 47.0 % (05.02.18 @ 12:17)  CBC Full  -  ( 02 May 2018 11:30 )  WBC Count : 26.78 K/uL  Hemoglobin : 11.3 g/dL  Hematocrit : 34.3 %  Platelet Count - Automated : 168 K/uL  Mean Cell Volume : 98.0 fL  Mean Cell Hemoglobin : 32.3 pg  Mean Cell Hemoglobin Concentration : 32.9 g/dL  Auto Neutrophil # : 24.72 K/uL  Auto Lymphocyte # : 0.92 K/uL  Auto Monocyte # : 0.39 K/uL  Auto Eosinophil # : 0.00 K/uL  Auto Basophil # : 0.05 K/uL  Auto Neutrophil % : 92.3 %  Auto Lymphocyte % : 3.4 %  Auto Monocyte % : 1.5 %  Auto Eosinophil % : 0.0 %  Auto Basophil % : 0.2 %  05-02    139  |  100  |  26<H>  ----------------------------<  75  4.4   |  25  |  1.3    Ca    6.8<L>      02 May 2018 11:30    TPro  6.1  /  Alb  3.5  /  TBili  1.2  /  DBili  0.4<H>  /  AST  28  /  ALT  <5  /  AlkPhos  122<H>  05-02  < from: Xray Chest 1 View AP/PA (05.02.18 @ 11:57) >    Impression:      No radiographic evidence of acute cardiopulmonary disease. Left basilar   focal atelectasis.    < end of copied text >

## 2018-05-03 LAB
ALBUMIN SERPL ELPH-MCNC: 2.8 G/DL — LOW (ref 3.5–5.2)
ANION GAP SERPL CALC-SCNC: 14 MMOL/L — SIGNIFICANT CHANGE UP (ref 7–14)
BUN SERPL-MCNC: 32 MG/DL — HIGH (ref 10–20)
CALCIUM SERPL-MCNC: 5.7 MG/DL — CRITICAL LOW (ref 8.5–10.1)
CHLORIDE SERPL-SCNC: 101 MMOL/L — SIGNIFICANT CHANGE UP (ref 98–110)
CO2 SERPL-SCNC: 22 MMOL/L — SIGNIFICANT CHANGE UP (ref 17–32)
CREAT SERPL-MCNC: 1 MG/DL — SIGNIFICANT CHANGE UP (ref 0.7–1.5)
E COLI DNA BLD POS QL NAA+NON-PROBE: SIGNIFICANT CHANGE UP
GLUCOSE SERPL-MCNC: 71 MG/DL — SIGNIFICANT CHANGE UP (ref 70–99)
GRAM STN FLD: SIGNIFICANT CHANGE UP
HCT VFR BLD CALC: 29.5 % — LOW (ref 37–47)
HGB BLD-MCNC: 9.7 G/DL — LOW (ref 12–16)
MCHC RBC-ENTMCNC: 32.1 PG — HIGH (ref 27–31)
MCHC RBC-ENTMCNC: 32.9 G/DL — SIGNIFICANT CHANGE UP (ref 32–37)
MCV RBC AUTO: 97.7 FL — SIGNIFICANT CHANGE UP (ref 81–99)
METHOD TYPE: SIGNIFICANT CHANGE UP
NRBC # BLD: 0 /100 WBCS — SIGNIFICANT CHANGE UP (ref 0–0)
PLATELET # BLD AUTO: 139 K/UL — SIGNIFICANT CHANGE UP (ref 130–400)
POTASSIUM SERPL-MCNC: 4.1 MMOL/L — SIGNIFICANT CHANGE UP (ref 3.5–5)
POTASSIUM SERPL-SCNC: 4.1 MMOL/L — SIGNIFICANT CHANGE UP (ref 3.5–5)
RBC # BLD: 3.02 M/UL — LOW (ref 4.2–5.4)
RBC # FLD: 16.8 % — HIGH (ref 11.5–14.5)
SODIUM SERPL-SCNC: 137 MMOL/L — SIGNIFICANT CHANGE UP (ref 135–146)
SPECIMEN SOURCE: SIGNIFICANT CHANGE UP
WBC # BLD: 17.94 K/UL — HIGH (ref 4.8–10.8)
WBC # FLD AUTO: 17.94 K/UL — HIGH (ref 4.8–10.8)

## 2018-05-03 RX ADMIN — SODIUM CHLORIDE 125 MILLILITER(S): 9 INJECTION INTRAMUSCULAR; INTRAVENOUS; SUBCUTANEOUS at 13:49

## 2018-05-03 RX ADMIN — MONTELUKAST 10 MILLIGRAM(S): 4 TABLET, CHEWABLE ORAL at 13:40

## 2018-05-03 RX ADMIN — METHOCARBAMOL 750 MILLIGRAM(S): 500 TABLET, FILM COATED ORAL at 06:02

## 2018-05-03 RX ADMIN — MORPHINE SULFATE 30 MILLIGRAM(S): 50 CAPSULE, EXTENDED RELEASE ORAL at 06:02

## 2018-05-03 RX ADMIN — HEPARIN SODIUM 5000 UNIT(S): 5000 INJECTION INTRAVENOUS; SUBCUTANEOUS at 06:02

## 2018-05-03 RX ADMIN — Medication 1 APPLICATION(S): at 17:12

## 2018-05-03 RX ADMIN — CARBIDOPA AND LEVODOPA 1 TABLET(S): 25; 100 TABLET ORAL at 13:40

## 2018-05-03 RX ADMIN — MORPHINE SULFATE 30 MILLIGRAM(S): 50 CAPSULE, EXTENDED RELEASE ORAL at 06:33

## 2018-05-03 RX ADMIN — Medication 5 MILLIGRAM(S): at 06:02

## 2018-05-03 RX ADMIN — LORATADINE 10 MILLIGRAM(S): 10 TABLET ORAL at 12:45

## 2018-05-03 RX ADMIN — CARBIDOPA AND LEVODOPA 1 TABLET(S): 25; 100 TABLET ORAL at 06:02

## 2018-05-03 RX ADMIN — SODIUM CHLORIDE 125 MILLILITER(S): 9 INJECTION INTRAMUSCULAR; INTRAVENOUS; SUBCUTANEOUS at 06:41

## 2018-05-03 RX ADMIN — Medication 1 APPLICATION(S): at 06:02

## 2018-05-03 RX ADMIN — METHOCARBAMOL 750 MILLIGRAM(S): 500 TABLET, FILM COATED ORAL at 21:11

## 2018-05-03 RX ADMIN — Medication 75 MICROGRAM(S): at 06:02

## 2018-05-03 RX ADMIN — CARBIDOPA AND LEVODOPA 1 TABLET(S): 25; 100 TABLET ORAL at 21:11

## 2018-05-03 RX ADMIN — CEFTRIAXONE 100 GRAM(S): 500 INJECTION, POWDER, FOR SOLUTION INTRAMUSCULAR; INTRAVENOUS at 20:51

## 2018-05-03 RX ADMIN — METHOCARBAMOL 750 MILLIGRAM(S): 500 TABLET, FILM COATED ORAL at 13:40

## 2018-05-03 RX ADMIN — MORPHINE SULFATE 30 MILLIGRAM(S): 50 CAPSULE, EXTENDED RELEASE ORAL at 17:11

## 2018-05-03 NOTE — CONSULT NOTE ADULT - ATTENDING COMMENTS
Attending Statement: I have personally performed a face to face diagnostic evaluation on this patient. I have written the above note pertaining to the history, exam and plan of care.

## 2018-05-03 NOTE — PHYSICAL THERAPY INITIAL EVALUATION ADULT - GENERAL OBSERVATIONS, REHAB EVAL
14:28 - 14:51.   Chart reviewed. Patient available to be seen for physical therapy, confirmed with nurse. Patient encountered semi-reclined in bed, +IV, +3L/minute oxygen via nasal cannula.  Denies pain at rest and would like to get OOB to chair.

## 2018-05-03 NOTE — PROGRESS NOTE ADULT - ASSESSMENT
A/P:   1. Fever: improved,   2. UTI:   Etiology is not clear. Possible UTI  vs. pneumonitis due to aspiration. CXR is clear, no infiltrate.   WBC went down 17k.   Continue Rocephin 1 g daily IV,   Pending urine and blood culture   CXR showed left lung atelectasis, patient has no symptoms, consider chest CT if no improvement.   ID consult if fever recurred.     3. Anemia:   Hb dropped 9.7 possible dilutional, on 4/11/18 Hb was 10 around baseline.   No acute bleeding, patient denies hematuria or melena.   Monitor CBC.     4. Chronic Back pain with vertebra fracture   s/p Kyphoplasty April 2018 for T9, T12, L1, L2 and L3  complicated with   Continue with Morphine ER and Fentanyl patch.     5. Psoriatic arthritis:   continue Prednisone and weekly Methotrexate     6. Parkinson's disease:   continue with Sinemet.     DVT PPX: Heparin SC.   GI PPX: PPI.     Disposition: consult rehab and PT. A/P:   1. Fever: improved,   2. UTI:   Etiology is not clear. Possible UTI  vs. pneumonitis due to aspiration. CXR is clear, no infiltrate.   WBC went down 17k.   Continue Rocephin 1 g daily IV,   Pending urine and blood culture   CXR showed left lung atelectasis, patient has no symptoms, consider chest CT if no improvement.   ID consult if fever recurred.     3. Hypoxia:   O2sat 90% on room air. She had recent kyphoplasty and complicated with cement embolization. Pulmonary didn't recommend  antiocagulation at that time.   Consult pulmonary again, may need Chest CT with Cont  Continue nasal canula, incentive spirometry.     4. Anemia:   Hb dropped 9.7 possible dilutional, on 4/11/18 Hb was 10 around baseline.   No acute bleeding, patient denies hematuria or melena.   Monitor CBC.     5. Chronic Back pain with vertebra fracture   s/p Kyphoplasty April 2018 for T9, T12, L1, L2 and L3  complicated with   Continue with Morphine ER and Fentanyl patch.     6. Psoriatic arthritis:   continue Prednisone and weekly Methotrexate     7. Parkinson's disease:   continue with Sinemet.     DVT PPX: Heparin SC.   GI PPX: PPI.     Disposition: consult rehab and PT. A/P:   1. Sepsis and Bacteremia: Blood culture is growing G neg rods.   2. UTI:   Etiology is not clear. Possible UTI  vs. pneumonitis due to aspiration.  WBC went down 17k.   Continue Rocephin 1 g daily IV,   Pending urine and blood culture   CXR showed left lung atelectasis, patient has no symptoms, consider chest CT if no improvement.   ID consult.      3. Hypoxia:   O2sat 90% on room air. She had recent kyphoplasty and complicated with cement embolization. Pulmonary didn't recommend  anticoagulation at that time.   Consult pulmonary again, may need Chest CT with Cont  Continue nasal canula, incentive spirometry.     4. Anemia:   Hb dropped 9.7 possible dilutional, on 4/11/18 Hb was 10 around baseline.   No acute bleeding, patient denies hematuria or melena.   Monitor CBC.     5. Chronic Back pain with vertebra fracture   s/p Kyphoplasty April 2018 for T9, T12, L1, L2 and L3  complicated with   Continue with Morphine ER and Fentanyl patch.     6. Psoriatic arthritis:   continue Prednisone and weekly Methotrexate     7. Parkinson's disease:   continue with Sinemet.     DVT PPX: Heparin SC.   GI PPX: PPI.     Disposition: consult rehab and PT.

## 2018-05-03 NOTE — CONSULT NOTE ADULT - ASSESSMENT
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    Suggest:    Supportive care  Continue current abx. Pt improving.  No need to anti coagulate for the cement emboli they will remain  life long and are likely not to cause major issues.  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    Suggest:    Supportive care  Continue current abx. Pt improving.  No need to anti coagulate for the cement emboli they will remain  life long and are likely not to cause major issues.  Pulmonary toilet  OOB  Keep SaO2 >92%  repeat CXR in AM

## 2018-05-03 NOTE — PHYSICAL THERAPY INITIAL EVALUATION ADULT - IMPAIRMENTS FOUND, PT EVAL
ergonomics and body mechanics/gait, locomotion, and balance/muscle strength/posture/aerobic capacity/endurance

## 2018-05-03 NOTE — PROVIDER CONTACT NOTE (OTHER) - SITUATION
Pt noted with slight confusion, SOB, wheeze. Po on 3LN 98%, Daughter at bedside. Dr. Renteria notified, and will be in to see patient. Pt now noted with no SOB, PO 99% on 3LNC and comfortable.

## 2018-05-03 NOTE — CONSULT NOTE ADULT - SUBJECTIVE AND OBJECTIVE BOX
YONATAN MEADE      Patient is a 77y old  Female who presents with a chief complaint of Fever (02 May 2018 15:44)      HPI:  This is 78 yo female with PMH of Parkinson's Disease, Hypothyroidism, Severe OA, low back pain, Fibromyalgia was referred from [facility] for fever 101F. She was sent from [facility] two weeks ago for rehab. She was supposed to discharge home today, but she developed fever and weakness. She denies cough, her daughter stated she chocked yesterday when she tried to swallow Tylenol. She denies any acute pain except her chronic skeletal pain. She recently underwent kyphoplasty for spinal fractures.   In the Ed her labs showed leukocytosis, UA mild UTI, CXR left atelectasis. (02 May 2018 15:44)        Allergies  adhesives (Pruritus; Rash)  Betadine (Flushing (Skin); Pruritus)  NSAIDs (Vomiting; Rash; Nausea)    Daily Height in cm: 157.48 (02 May 2018 16:04)    Daily Weight in k.5 (02 May 2018 16:04)    FAMILY HISTORY:  No pertinent family history in first degree relatives          Vital Signs Last 24 Hrs  T(C): 37.2 (03 May 2018 05:48), Max: 38.5 (02 May 2018 11:12)  T(F): 98.9 (03 May 2018 05:48), Max: 101.3 (02 May 2018 11:12)  HR: 105 (03 May 2018 05:48) (85 - 115)  BP: 119/53 (03 May 2018 05:48) (103/54 - 125/61)  RR: 16 (03 May 2018 05:48) (16 - 20)  SpO2: 99% (02 May 2018 14:11) (96% - 99%)    REVIEW OF SYSTEMS:  CONSTITUTIONAL: No fever, weight loss, or fatigue  EYES: No eye pain, visual disturbances, or discharge  NECK: No pain or stiffness  RESPIRATORY: No cough, wheezing, chills or hemoptysis; +shortness of breath  CARDIOVASCULAR: No chest pain, palpitations, dizziness, or leg swelling  GASTROINTESTINAL: No abdominal or epigastric pain. No nausea, vomiting, or hematemesis; No diarrhea or constipation. No melena or hematochezia.  GENITOURINARY: No dysuria, frequency, hematuria, or incontinence  NEUROLOGICAL: No headaches, memory loss, loss of strength, numbness, or tremors  MUSCULOSKELETAL: No joint pain or swelling; No muscle, back, or extremity pain        PT/INR - ( 02 May 2018 11:30 )   PT: 19.50 sec;   INR: 1.78 ratio         PTT - ( 02 May 2018 11:30 )  PTT:31.1 sec                          9.7    17.94 )-----------( 139      ( 03 May 2018 06:53 )             29.5       05-03    137  |  101  |  32<H>  ----------------------------<  71  4.1   |  22  |  1.0    Ca    5.7<LL>      03 May 2018 06:53    TPro  x   /  Alb  2.8<L>  /  TBili  x   /  DBili  x   /  AST  x   /  ALT  x   /  AlkPhos  x   05-03      CARDIAC MARKERS ( 02 May 2018 11:30 )  x     / <0.01 ng/mL / 99 U/L / x     / x            LIVER FUNCTIONS - ( 03 May 2018 06:53 )  Alb: 2.8 g/dL / Pro: x     / ALK PHOS: x     / ALT: x     / AST: x     / GGT: x                 CAPILLARY BLOOD GLUCOSE  82 (02 May 2018 12:02)          Urinalysis Basic - ( 02 May 2018 11:30 )    Color: Yellow / Appearance: Hazy / SG: >=1.030 / pH: x  Gluc: x / Ketone: Trace  / Bili: Small / Urobili: 0.2   Blood: x / Protein: >=300 mg/dL / Nitrite: Negative   Leuk Esterase: Small / RBC: 5-10 /HPF / WBC 6-10 /HPF   Sq Epi: x / Non Sq Epi: Many /HPF / Bacteria: Many        Radiology: I feel there is a LLL infiltrate.   CT chest reviewed shows cement emboli in the RUL    MEDICATIONS  (STANDING):  BACItracin   Ointment 1 Application(s) Topical two times a day  carbidopa/levodopa  10/100 1 Tablet(s) Oral three times a day  cefTRIAXone   IVPB 1 Gram(s) IV Intermittent every 24 hours  fentaNYL   Patch  12 MICROgram(s)/Hr 1 Patch Transdermal every 72 hours  heparin  Injectable 5000 Unit(s) SubCutaneous every 12 hours  levothyroxine 75 MICROGram(s) Oral daily  loratadine 10 milliGRAM(s) Oral daily  methocarbamol 750 milliGRAM(s) Oral three times a day  montelukast 10 milliGRAM(s) Oral daily  morphine ER Tablet 30 milliGRAM(s) Oral two times a day  predniSONE   Tablet 5 milliGRAM(s) Oral daily  sodium chloride 0.9%. 1000 milliLiter(s) (125 mL/Hr) IV Continuous <Continuous>    MEDICATIONS  (PRN):  acetaminophen   Tablet 325 milliGRAM(s) Oral every 4 hours PRN For Temp greater than 38 C (100.4 F)  bisacodyl 5 milliGRAM(s) Oral daily PRN Constipation  morphine ER Tablet 15 milliGRAM(s) Oral every 12 hours PRN severe breakthrough pain  oxyCODONE    5 mG/acetaminophen 325 mG 1 Tablet(s) Oral every 4 hours PRN Severe Pain (7 - 10)  senna 2 Tablet(s) Oral at bedtime PRN Constipation      Prescriptions:  lidocaine 5% topical film: Apply topically to affected area once a day -for moderate pain  (2018 12:20)  methocarbamol 750 mg oral tablet: 1 tab(s) orally every 8 hours  (2018 09:46)        PHYSICAL EXAM:  GENERAL: NAD, well-groomed, well-developed  HEAD:  Atraumatic, Normocephalic  EYES: EOMI, PERRLA, conjunctiva and sclera clear  NERVOUS SYSTEM:  Alert & Oriented X 4, Good concentration; Motor Strength 5/5 B/L upper and lower extremities; DTRs 2+ intact and symmetric  CHEST/LUNG: ++ rales at bases, no rhonchi, wheezing, or rubs  HEART: Regular rate and rhythm; No murmurs, rubs, or gallops  ABDOMEN: Soft, Nontender, Nondistended; Bowel sounds present  EXTREMITIES:  2+ Peripheral Pulses, No clubbing, cyanosis, or edema  SKIN: No rashes or lesions YONATAN MEADE      Patient is a 77y old  Female who presents with a chief complaint of Fever (02 May 2018 15:44)      HPI:  This is 78 yo female with PMH of Parkinson's Disease, Hypothyroidism, Severe OA, low back pain, Fibromyalgia was referred from [facility] for fever 101F. She was sent from [facility] two weeks ago for rehab. She was supposed to discharge home today, but she developed fever and weakness. She denies cough, her daughter stated she chocked yesterday when she tried to swallow Tylenol. She denies any acute pain except her chronic skeletal pain. She recently underwent kyphoplasty for spinal fractures.   In the Ed her labs showed leukocytosis, UA mild UTI, CXR left atelectasis. (02 May 2018 15:44)        Allergies  adhesives (Pruritus; Rash)  Betadine (Flushing (Skin); Pruritus)  NSAIDs (Vomiting; Rash; Nausea)    Daily Height in cm: 157.48 (02 May 2018 16:04)    Daily Weight in k.5 (02 May 2018 16:04)    FAMILY HISTORY:  No pertinent family history in first degree relatives          Vital Signs Last 24 Hrs  T(C): 37.2 (03 May 2018 05:48), Max: 38.5 (02 May 2018 11:12)  T(F): 98.9 (03 May 2018 05:48), Max: 101.3 (02 May 2018 11:12)  HR: 105 (03 May 2018 05:48) (85 - 115)  BP: 119/53 (03 May 2018 05:48) (103/54 - 125/61)  RR: 16 (03 May 2018 05:48) (16 - 20)  SpO2: 99% (02 May 2018 14:11) (96% - 99%)    REVIEW OF SYSTEMS:  CONSTITUTIONAL: No fever, weight loss, or fatigue  EYES: No eye pain, visual disturbances, or discharge  NECK: No pain or stiffness  RESPIRATORY: No cough, wheezing, chills or hemoptysis; +shortness of breath  CARDIOVASCULAR: No chest pain, palpitations, dizziness, or leg swelling  GASTROINTESTINAL: No abdominal or epigastric pain. No nausea, vomiting, or hematemesis; No diarrhea or constipation. No melena or hematochezia.  GENITOURINARY: No dysuria, frequency, hematuria, or incontinence  NEUROLOGICAL: No headaches, memory loss, loss of strength, numbness, or tremors  MUSCULOSKELETAL: No joint pain or swelling; No muscle, back, or extremity pain        PT/INR - ( 02 May 2018 11:30 )   PT: 19.50 sec;   INR: 1.78 ratio         PTT - ( 02 May 2018 11:30 )  PTT:31.1 sec                          9.7    17.94 )-----------( 139      ( 03 May 2018 06:53 )             29.5       05-03    137  |  101  |  32<H>  ----------------------------<  71  4.1   |  22  |  1.0    Ca    5.7<LL>      03 May 2018 06:53    TPro  x   /  Alb  2.8<L>  /  TBili  x   /  DBili  x   /  AST  x   /  ALT  x   /  AlkPhos  x   05-03      CARDIAC MARKERS ( 02 May 2018 11:30 )  x     / <0.01 ng/mL / 99 U/L / x     / x            LIVER FUNCTIONS - ( 03 May 2018 06:53 )  Alb: 2.8 g/dL / Pro: x     / ALK PHOS: x     / ALT: x     / AST: x     / GGT: x                 CAPILLARY BLOOD GLUCOSE  82 (02 May 2018 12:02)          Urinalysis Basic - ( 02 May 2018 11:30 )    Color: Yellow / Appearance: Hazy / SG: >=1.030 / pH: x  Gluc: x / Ketone: Trace  / Bili: Small / Urobili: 0.2   Blood: x / Protein: >=300 mg/dL / Nitrite: Negative   Leuk Esterase: Small / RBC: 5-10 /HPF / WBC 6-10 /HPF   Sq Epi: x / Non Sq Epi: Many /HPF / Bacteria: Many        Radiology: I feel there is a LLL infiltrate.   Recent CT chest reviewed shows cement emboli in the RUL    MEDICATIONS  (STANDING):  BACItracin   Ointment 1 Application(s) Topical two times a day  carbidopa/levodopa  10/100 1 Tablet(s) Oral three times a day  cefTRIAXone   IVPB 1 Gram(s) IV Intermittent every 24 hours  fentaNYL   Patch  12 MICROgram(s)/Hr 1 Patch Transdermal every 72 hours  heparin  Injectable 5000 Unit(s) SubCutaneous every 12 hours  levothyroxine 75 MICROGram(s) Oral daily  loratadine 10 milliGRAM(s) Oral daily  methocarbamol 750 milliGRAM(s) Oral three times a day  montelukast 10 milliGRAM(s) Oral daily  morphine ER Tablet 30 milliGRAM(s) Oral two times a day  predniSONE   Tablet 5 milliGRAM(s) Oral daily  sodium chloride 0.9%. 1000 milliLiter(s) (125 mL/Hr) IV Continuous <Continuous>    MEDICATIONS  (PRN):  acetaminophen   Tablet 325 milliGRAM(s) Oral every 4 hours PRN For Temp greater than 38 C (100.4 F)  bisacodyl 5 milliGRAM(s) Oral daily PRN Constipation  morphine ER Tablet 15 milliGRAM(s) Oral every 12 hours PRN severe breakthrough pain  oxyCODONE    5 mG/acetaminophen 325 mG 1 Tablet(s) Oral every 4 hours PRN Severe Pain (7 - 10)  senna 2 Tablet(s) Oral at bedtime PRN Constipation      Prescriptions:  lidocaine 5% topical film: Apply topically to affected area once a day -for moderate pain  (2018 12:20)  methocarbamol 750 mg oral tablet: 1 tab(s) orally every 8 hours  (2018 09:46)        PHYSICAL EXAM:  GENERAL: NAD, well-groomed, well-developed  HEAD:  Atraumatic, Normocephalic  EYES: EOMI, PERRLA, conjunctiva and sclera clear  NERVOUS SYSTEM:  Alert & Oriented X 4, Good concentration; Motor Strength 5/5 B/L upper and lower extremities; DTRs 2+ intact and symmetric  CHEST/LUNG: ++ rales at bases, no rhonchi, wheezing, or rubs  HEART: Regular rate and rhythm; No murmurs, rubs, or gallops  ABDOMEN: Soft, Nontender, Nondistended; Bowel sounds present  EXTREMITIES:  2+ Peripheral Pulses, No clubbing, cyanosis, or edema  SKIN: No rashes or lesions YONATAN MEADE      Patient is a 77y old  Female who presents with a chief complaint of Fever (02 May 2018 15:44)      HPI:  This is 76 yo female with PMH of Parkinson's Disease, Hypothyroidism, Severe OA, low back pain, Fibromyalgia was referred from [facility] for fever 101F. She was sent from [facility] two weeks ago for rehab. She was supposed to discharge home today, but she developed fever and weakness. She denies cough, her daughter stated she choked yesterday when she tried to swallow Tylenol. She denies any acute pain except her chronic skeletal pain. She recently underwent kyphoplasty for spinal fractures.   In the Ed her labs showed leukocytosis, UA mild UTI, CXR left atelectasis. (02 May 2018 15:44)        Allergies  adhesives (Pruritus; Rash)  Betadine (Flushing (Skin); Pruritus)  NSAIDs (Vomiting; Rash; Nausea)    Daily Height in cm: 157.48 (02 May 2018 16:04)    Daily Weight in k.5 (02 May 2018 16:04)    FAMILY HISTORY:  No pertinent family history in first degree relatives          Vital Signs Last 24 Hrs  T(C): 37.2 (03 May 2018 05:48), Max: 38.5 (02 May 2018 11:12)  T(F): 98.9 (03 May 2018 05:48), Max: 101.3 (02 May 2018 11:12)  HR: 105 (03 May 2018 05:48) (85 - 115)  BP: 119/53 (03 May 2018 05:48) (103/54 - 125/61)  RR: 16 (03 May 2018 05:48) (16 - 20)  SpO2: 99% (02 May 2018 14:11) (96% - 99%)    REVIEW OF SYSTEMS:  CONSTITUTIONAL: No fever, weight loss, or fatigue  EYES: No eye pain, visual disturbances, or discharge  NECK: No pain or stiffness  RESPIRATORY: No cough, wheezing, chills or hemoptysis; +shortness of breath  CARDIOVASCULAR: No chest pain, palpitations, dizziness, or leg swelling  GASTROINTESTINAL: No abdominal or epigastric pain. No nausea, vomiting, or hematemesis; No diarrhea or constipation. No melena or hematochezia.  GENITOURINARY: No dysuria, frequency, hematuria, or incontinence  NEUROLOGICAL: No headaches, memory loss, loss of strength, numbness, or tremors  MUSCULOSKELETAL: No joint pain or swelling; No muscle, back, or extremity pain        PT/INR - ( 02 May 2018 11:30 )   PT: 19.50 sec;   INR: 1.78 ratio         PTT - ( 02 May 2018 11:30 )  PTT:31.1 sec                          9.7    17.94 )-----------( 139      ( 03 May 2018 06:53 )             29.5       05-03    137  |  101  |  32<H>  ----------------------------<  71  4.1   |  22  |  1.0    Ca    5.7<LL>      03 May 2018 06:53    TPro  x   /  Alb  2.8<L>  /  TBili  x   /  DBili  x   /  AST  x   /  ALT  x   /  AlkPhos  x   05-03      CARDIAC MARKERS ( 02 May 2018 11:30 )  x     / <0.01 ng/mL / 99 U/L / x     / x            LIVER FUNCTIONS - ( 03 May 2018 06:53 )  Alb: 2.8 g/dL / Pro: x     / ALK PHOS: x     / ALT: x     / AST: x     / GGT: x                 CAPILLARY BLOOD GLUCOSE  82 (02 May 2018 12:02)          Urinalysis Basic - ( 02 May 2018 11:30 )    Color: Yellow / Appearance: Hazy / SG: >=1.030 / pH: x  Gluc: x / Ketone: Trace  / Bili: Small / Urobili: 0.2   Blood: x / Protein: >=300 mg/dL / Nitrite: Negative   Leuk Esterase: Small / RBC: 5-10 /HPF / WBC 6-10 /HPF   Sq Epi: x / Non Sq Epi: Many /HPF / Bacteria: Many        Radiology: I feel there is a LLL infiltrate.   Recent CT chest reviewed shows cement emboli in the RUL    MEDICATIONS  (STANDING):  BACItracin   Ointment 1 Application(s) Topical two times a day  carbidopa/levodopa  10/100 1 Tablet(s) Oral three times a day  cefTRIAXone   IVPB 1 Gram(s) IV Intermittent every 24 hours  fentaNYL   Patch  12 MICROgram(s)/Hr 1 Patch Transdermal every 72 hours  heparin  Injectable 5000 Unit(s) SubCutaneous every 12 hours  levothyroxine 75 MICROGram(s) Oral daily  loratadine 10 milliGRAM(s) Oral daily  methocarbamol 750 milliGRAM(s) Oral three times a day  montelukast 10 milliGRAM(s) Oral daily  morphine ER Tablet 30 milliGRAM(s) Oral two times a day  predniSONE   Tablet 5 milliGRAM(s) Oral daily  sodium chloride 0.9%. 1000 milliLiter(s) (125 mL/Hr) IV Continuous <Continuous>    MEDICATIONS  (PRN):  acetaminophen   Tablet 325 milliGRAM(s) Oral every 4 hours PRN For Temp greater than 38 C (100.4 F)  bisacodyl 5 milliGRAM(s) Oral daily PRN Constipation  morphine ER Tablet 15 milliGRAM(s) Oral every 12 hours PRN severe breakthrough pain  oxyCODONE    5 mG/acetaminophen 325 mG 1 Tablet(s) Oral every 4 hours PRN Severe Pain (7 - 10)  senna 2 Tablet(s) Oral at bedtime PRN Constipation      Prescriptions:  lidocaine 5% topical film: Apply topically to affected area once a day -for moderate pain  (2018 12:20)  methocarbamol 750 mg oral tablet: 1 tab(s) orally every 8 hours  (2018 09:46)        PHYSICAL EXAM:  GENERAL: NAD, well-groomed, well-developed  HEAD:  Atraumatic, Normocephalic  EYES: EOMI, PERRLA, conjunctiva and sclera clear  NERVOUS SYSTEM:  Alert & Oriented X 4, Good concentration; Motor Strength 5/5 B/L upper and lower extremities; DTRs 2+ intact and symmetric  CHEST/LUNG: ++ rales at bases, no rhonchi, wheezing, or rubs  HEART: Regular rate and rhythm; No murmurs, rubs, or gallops  ABDOMEN: Soft, Nontender, Nondistended; Bowel sounds present  EXTREMITIES:  2+ Peripheral Pulses, No clubbing, cyanosis, or edema  SKIN: No rashes or lesions

## 2018-05-03 NOTE — PROGRESS NOTE ADULT - SUBJECTIVE AND OBJECTIVE BOX
HALINA YONATAN  77y  Female      Patient is a 77y old  Female who presents with a chief complaint of Fever (02 May 2018 15:44)      INTERVAL HPI/OVERNIGHT EVENTS:  She feels ok, no fever, no cough, diarrhea or urinary symptoms.     Vital Signs Last 24 Hrs  T(C): 37.2 (03 May 2018 05:48), Max: 38.5 (02 May 2018 11:12)  T(F): 98.9 (03 May 2018 05:48), Max: 101.3 (02 May 2018 11:12)  HR: 105 (03 May 2018 05:48) (85 - 115)  BP: 119/53 (03 May 2018 05:48) (103/54 - 125/61)  BP(mean): --  RR: 16 (03 May 2018 05:48) (16 - 20)  SpO2: 99% (02 May 2018 14:11) (96% - 99%)      05-02-18 @ 07:01  -  05-03-18 @ 07:00  --------------------------------------------------------  IN: 310 mL / OUT: 0 mL / NET: 310 mL            Consultant(s) Notes Reviewed:  [x ] YES  [ ] NO          MEDICATIONS  (STANDING):  BACItracin   Ointment 1 Application(s) Topical two times a day  carbidopa/levodopa  10/100 1 Tablet(s) Oral three times a day  cefTRIAXone   IVPB 1 Gram(s) IV Intermittent every 24 hours  fentaNYL   Patch  12 MICROgram(s)/Hr 1 Patch Transdermal every 72 hours  heparin  Injectable 5000 Unit(s) SubCutaneous every 12 hours  levothyroxine 75 MICROGram(s) Oral daily  loratadine 10 milliGRAM(s) Oral daily  methocarbamol 750 milliGRAM(s) Oral three times a day  montelukast 10 milliGRAM(s) Oral daily  morphine ER Tablet 30 milliGRAM(s) Oral two times a day  predniSONE   Tablet 5 milliGRAM(s) Oral daily  sodium chloride 0.9%. 1000 milliLiter(s) (125 mL/Hr) IV Continuous <Continuous>    MEDICATIONS  (PRN):  acetaminophen   Tablet 325 milliGRAM(s) Oral every 4 hours PRN For Temp greater than 38 C (100.4 F)  bisacodyl 5 milliGRAM(s) Oral daily PRN Constipation  morphine ER Tablet 15 milliGRAM(s) Oral every 12 hours PRN severe breakthrough pain  oxyCODONE    5 mG/acetaminophen 325 mG 1 Tablet(s) Oral every 4 hours PRN Severe Pain (7 - 10)  senna 2 Tablet(s) Oral at bedtime PRN Constipation      LABS                          9.7    17.94 )-----------( 139      ( 03 May 2018 06:53 )             29.5     05-02    139  |  100  |  26<H>  ----------------------------<  75  4.4   |  25  |  1.3    Ca    6.8<L>      02 May 2018 11:30    TPro  6.1  /  Alb  3.5  /  TBili  1.2  /  DBili  0.4<H>  /  AST  28  /  ALT  <5  /  AlkPhos  122<H>  05-02      Urinalysis Basic - ( 02 May 2018 11:30 )    Color: Yellow / Appearance: Hazy / SG: >=1.030 / pH: x  Gluc: x / Ketone: Trace  / Bili: Small / Urobili: 0.2   Blood: x / Protein: >=300 mg/dL / Nitrite: Negative   Leuk Esterase: Small / RBC: 5-10 /HPF / WBC 6-10 /HPF   Sq Epi: x / Non Sq Epi: Many /HPF / Bacteria: Many      PT/INR - ( 02 May 2018 11:30 )   PT: 19.50 sec;   INR: 1.78 ratio         PTT - ( 02 May 2018 11:30 )  PTT:31.1 sec  Lactate Trend  05-02 @ 11:30 Lactate:1.9     CARDIAC MARKERS ( 02 May 2018 11:30 )  x     / <0.01 ng/mL / 99 U/L / x     / x          CAPILLARY BLOOD GLUCOSE  82 (02 May 2018 12:02)            RADIOLOGY & ADDITIONAL TESTS:    Imaging Personally Reviewed:  [ ] YES  [ ] NO    HEALTH ISSUES - PROBLEM Dx: YONATAN MEADE  77y  Female      Patient is a 77y old  Female who presents with a chief complaint of Fever (02 May 2018 15:44)      INTERVAL HPI/OVERNIGHT EVENTS:  She feels ok, no fever, no cough, diarrhea or urinary symptoms. She is mildly hypoxic on RA.     Vital Signs Last 24 Hrs  T(C): 37.2 (03 May 2018 05:48), Max: 38.5 (02 May 2018 11:12)  T(F): 98.9 (03 May 2018 05:48), Max: 101.3 (02 May 2018 11:12)  HR: 105 (03 May 2018 05:48) (85 - 115)  BP: 119/53 (03 May 2018 05:48) (103/54 - 125/61)  BP(mean): --  RR: 16 (03 May 2018 05:48) (16 - 20)  SpO2: 99% (02 May 2018 14:11) (96% - 99%)      05-02-18 @ 07:01  -  05-03-18 @ 07:00  --------------------------------------------------------  IN: 310 mL / OUT: 0 mL / NET: 310 mL            Consultant(s) Notes Reviewed:  [x ] YES  [ ] NO          MEDICATIONS  (STANDING):  BACItracin   Ointment 1 Application(s) Topical two times a day  carbidopa/levodopa  10/100 1 Tablet(s) Oral three times a day  cefTRIAXone   IVPB 1 Gram(s) IV Intermittent every 24 hours  fentaNYL   Patch  12 MICROgram(s)/Hr 1 Patch Transdermal every 72 hours  heparin  Injectable 5000 Unit(s) SubCutaneous every 12 hours  levothyroxine 75 MICROGram(s) Oral daily  loratadine 10 milliGRAM(s) Oral daily  methocarbamol 750 milliGRAM(s) Oral three times a day  montelukast 10 milliGRAM(s) Oral daily  morphine ER Tablet 30 milliGRAM(s) Oral two times a day  predniSONE   Tablet 5 milliGRAM(s) Oral daily  sodium chloride 0.9%. 1000 milliLiter(s) (125 mL/Hr) IV Continuous <Continuous>    MEDICATIONS  (PRN):  acetaminophen   Tablet 325 milliGRAM(s) Oral every 4 hours PRN For Temp greater than 38 C (100.4 F)  bisacodyl 5 milliGRAM(s) Oral daily PRN Constipation  morphine ER Tablet 15 milliGRAM(s) Oral every 12 hours PRN severe breakthrough pain  oxyCODONE    5 mG/acetaminophen 325 mG 1 Tablet(s) Oral every 4 hours PRN Severe Pain (7 - 10)  senna 2 Tablet(s) Oral at bedtime PRN Constipation      LABS                          9.7    17.94 )-----------( 139      ( 03 May 2018 06:53 )             29.5     05-02    139  |  100  |  26<H>  ----------------------------<  75  4.4   |  25  |  1.3    Ca    6.8<L>      02 May 2018 11:30    TPro  6.1  /  Alb  3.5  /  TBili  1.2  /  DBili  0.4<H>  /  AST  28  /  ALT  <5  /  AlkPhos  122<H>  05-02      Urinalysis Basic - ( 02 May 2018 11:30 )    Color: Yellow / Appearance: Hazy / SG: >=1.030 / pH: x  Gluc: x / Ketone: Trace  / Bili: Small / Urobili: 0.2   Blood: x / Protein: >=300 mg/dL / Nitrite: Negative   Leuk Esterase: Small / RBC: 5-10 /HPF / WBC 6-10 /HPF   Sq Epi: x / Non Sq Epi: Many /HPF / Bacteria: Many      PT/INR - ( 02 May 2018 11:30 )   PT: 19.50 sec;   INR: 1.78 ratio         PTT - ( 02 May 2018 11:30 )  PTT:31.1 sec  Lactate Trend  05-02 @ 11:30 Lactate:1.9     CARDIAC MARKERS ( 02 May 2018 11:30 )  x     / <0.01 ng/mL / 99 U/L / x     / x          CAPILLARY BLOOD GLUCOSE  82 (02 May 2018 12:02)            RADIOLOGY & ADDITIONAL TESTS:    Imaging Personally Reviewed:  [ ] YES  [ ] NO    HEALTH ISSUES - PROBLEM Dx:

## 2018-05-03 NOTE — PROVIDER CONTACT NOTE (OTHER) - SITUATION
Heart rate of 155, pt does not complain of palpatiations or chest discomfort. MD Doll aware, EKG recommended, awaiting further instructions from MD Doll

## 2018-05-03 NOTE — CONSULT NOTE ADULT - ASSESSMENT
IMPRESSION: Rehab of  78 y/o  f  rehab  for  decline parkinson  dis  gd  RA    PRECAUTIONS: [  ] Cardiac  [  ] Respiratory  [  ] Seizures [  ] Contact Isolation  [  ] Droplet Isolation  [  x] Other fall     Weight Bearing Status:     RECOMMENDATION:    Out of Bed to Chair     DVT/Decubiti Prophylaxis    REHAB PLAN:     [  xx Bedside P/T 3-5 times a week   [   ]   Bedside O/T  2-3 times a week             [   ] No Rehab Therapy Indicated                   [   ]  Speech Therapy   Conditioning/ROM                                    ADL  Bed Mobility                                               Conditioning/ROM  Transfers                                                     Bed Mobility  Sitting /Standing Balance                         Transfers                                        Gait Training                                               Sitting/Standing Balance  Stair Training [   ]Applicable                    Home equipment Eval                                                                        Splinting  [   ] Only      GOALS:   ADL   [ x]   Independent                    Transfers  [  x ] Independent                          Ambulation  [   x Independent     [  x ] With device                            [   ]  CG                                                         [   ]  CG                                                                  [   ] CG                            [    ] Min A                                                   [   ] Min A                                                              [   ] Min  A          DISCHARGE PLAN:   [   ]  Good candidate for Intensive Rehabilitation/Hospital based-[facility]A [facility]                                             Will tolerate 3hrs Intensive Rehab Daily                                       [ xx ]  Short Term Rehab in Skilled Nursing Facility                                       [    ]  Home with Outpatient or VN services                                         [    ]  Possible Candidate for Intensive Hospital based Rehab

## 2018-05-03 NOTE — CONSULT NOTE ADULT - SUBJECTIVE AND OBJECTIVE BOX
HPI:   78 yo female with PMH of Parkinson's Disease, Hypothyroidism, Severe OA, low back pain, Fibromyalgia was referred from [facility] for fever 101F. She was sent from [facility] two weeks ago for rehab. She was supposed to discharge home today, but she developed fever and weakness. She denies cough, her daughter stated she chocked yesterday when she tried to swallow Tylenol. She denies any acute pain except her chronic skeletal pain. She recently underwent kyphoplasty for spinal fractures.   In the Ed her labs showed leukocytosis, UA mild UTI, CXR left atelectasis.     PTN  REFERRED TO ACUTE  REHAB  FOR  EVAL AND  TX   PAST MEDICAL & SURGICAL HISTORY:  Seasonal allergies  DWAYNE on CP  Obesity: s/p gastric bypass ~15 yrs ago ~100 lb loss  Osteoarthritis  Rheumatoid arthritis  Fibromyalgia  Tremors of nervous system: essential tremors  Psoriatic arthritis  GERD (gastroesophageal reflux disease)  Hypothyroidism: no recent dosage changes  Ankle deformity: s/p b/l surgical repair  H/O gastric bypass      Hospital Course:    TODAY'S SUBJECTIVE & REVIEW OF SYMPTOMS:     Constitutional WNL   Cardio WNL   Resp WNL   GI WNL  Heme WNL  Endo WNL  Skin WNL  MSK WNL  Neuro WNL  Cognitive WNL  Psych WNL      MEDICATIONS  (STANDING):  BACItracin   Ointment 1 Application(s) Topical two times a day  carbidopa/levodopa  10/100 1 Tablet(s) Oral three times a day  cefTRIAXone   IVPB 1 Gram(s) IV Intermittent every 24 hours  fentaNYL   Patch  12 MICROgram(s)/Hr 1 Patch Transdermal every 72 hours  heparin  Injectable 5000 Unit(s) SubCutaneous every 12 hours  levothyroxine 75 MICROGram(s) Oral daily  loratadine 10 milliGRAM(s) Oral daily  methocarbamol 750 milliGRAM(s) Oral three times a day  montelukast 10 milliGRAM(s) Oral daily  morphine ER Tablet 30 milliGRAM(s) Oral two times a day  predniSONE   Tablet 5 milliGRAM(s) Oral daily  sodium chloride 0.9%. 1000 milliLiter(s) (125 mL/Hr) IV Continuous <Continuous>    MEDICATIONS  (PRN):  acetaminophen   Tablet 325 milliGRAM(s) Oral every 4 hours PRN For Temp greater than 38 C (100.4 F)  bisacodyl 5 milliGRAM(s) Oral daily PRN Constipation  morphine ER Tablet 15 milliGRAM(s) Oral every 12 hours PRN severe breakthrough pain  oxyCODONE    5 mG/acetaminophen 325 mG 1 Tablet(s) Oral every 4 hours PRN Severe Pain (7 - 10)  senna 2 Tablet(s) Oral at bedtime PRN Constipation      FAMILY HISTORY:  No pertinent family history in first degree relatives      Allergies    adhesives (Pruritus; Rash)  Betadine (Flushing (Skin); Pruritus)  NSAIDs (Vomiting; Rash; Nausea)    Intolerances        SOCIAL HISTORY:    [  ] Etoh  [  ] Smoking  [  ] Substance abuse     Home Environment:  [  ] Home Alone  [ x ] Lives with Family  [  ] Home Health Aid    Dwelling:  [  ] Apartment  [ x ] Private House  [  ] Adult Home  [  ] Skilled Nursing Facility      [  ] Short Term  [  ] Long Term  [  ] Stairs       Elevator [  ] chair  left ing      FUNCTIONAL STATUS PTA: (Check all that apply)  Ambulation: [   ]Independent    [ x ] Dependent     [  ] Non-Ambulatory  Assistive Device: [ x ] SA Cane  [  ]  Q Cane  [ x] Walker  [  ]  Wheelchair  ADL : [ x ] Independent  [  ]  Dependent       Vital Signs Last 24 Hrs  T(C): 36.7 (03 May 2018 15:25), Max: 37.2 (03 May 2018 05:48)  T(F): 98 (03 May 2018 15:25), Max: 98.9 (03 May 2018 05:48)  HR: 99 (03 May 2018 15:25) (95 - 105)  BP: 134/63 (03 May 2018 15:25) (104/58 - 134/63)  BP(mean): --  RR: 16 (03 May 2018 15:25) (16 - 16)  SpO2: 98% (03 May 2018 15:36) (89% - 98%)      PHYSICAL EXAM: Alert & Oriented X2  GENERAL: NAD, well-groomed, well-developed  HEAD:  Atraumatic, Normocephalic  EYES: EOMI, PERRLA, conjunctiva and sclera clear  NECK: Supple, No JVD, Normal thyroid  CHEST/LUNG: Clear to percussion bilaterally; No rales, rhonchi, wheezing, or rubs  HEART: Regular rate and rhythm; No murmurs, rubs, or gallops  ABDOMEN: Soft, Nontender, Nondistended; Bowel sounds present  EXTREMITIES:  2+ Peripheral Pulses, No clubbing, cyanosis, or edema    NERVOUS SYSTEM:  Cranial Nerves 2-12 intact [ x ] Abnormal  [  ]  ROM: WFL all extremities [ x ]  Abnormal [  ]  Motor Strength: WFL all extremities  [  ]  Abnormal [ x ]  Sensation: intact to light touch [ x ] Abnormal [  ]  Reflexes: Symmetric [  x]  Abnormal [  ]    FUNCTIONAL STATUS:  Bed Mobility: Independent [  ]  Supervision [  ]  Needs Assistance [  ]  N/A [  ]  Transfers: Independent [  ]  Supervision [  ]  Needs Assistance [ x ]  N/A [  ]   Ambulation: Independent [  ]  Supervision [  ]  Needs Assistance [ x ]  N/A [  ]  ADL: Independent [  ] Requires Assistance [ x ] N/A [  ]      LABS:                        9.7    17.94 )-----------( 139      ( 03 May 2018 06:53 )             29.5     05-03    137  |  101  |  32<H>  ----------------------------<  71  4.1   |  22  |  1.0    Ca    5.7<LL>      03 May 2018 06:53    TPro  x   /  Alb  2.8<L>  /  TBili  x   /  DBili  x   /  AST  x   /  ALT  x   /  AlkPhos  x   05-03    PT/INR - ( 02 May 2018 11:30 )   PT: 19.50 sec;   INR: 1.78 ratio         PTT - ( 02 May 2018 11:30 )  PTT:31.1 sec  Urinalysis Basic - ( 02 May 2018 11:30 )    Color: Yellow / Appearance: Hazy / SG: >=1.030 / pH: x  Gluc: x / Ketone: Trace  / Bili: Small / Urobili: 0.2   Blood: x / Protein: >=300 mg/dL / Nitrite: Negative   Leuk Esterase: Small / RBC: 5-10 /HPF / WBC 6-10 /HPF   Sq Epi: x / Non Sq Epi: Many /HPF / Bacteria: Many        RADIOLOGY & ADDITIONAL STUDIES:    Assesment:

## 2018-05-04 DIAGNOSIS — A41.51 SEPSIS DUE TO ESCHERICHIA COLI [E. COLI]: ICD-10-CM

## 2018-05-04 DIAGNOSIS — N39.0 URINARY TRACT INFECTION, SITE NOT SPECIFIED: ICD-10-CM

## 2018-05-04 LAB
-  AMIKACIN: SIGNIFICANT CHANGE UP
-  AMOXICILLIN/CLAVULANIC ACID: SIGNIFICANT CHANGE UP
-  AMPICILLIN/SULBACTAM: SIGNIFICANT CHANGE UP
-  AMPICILLIN: SIGNIFICANT CHANGE UP
-  AZTREONAM: SIGNIFICANT CHANGE UP
-  CEFAZOLIN: SIGNIFICANT CHANGE UP
-  CEFEPIME: SIGNIFICANT CHANGE UP
-  CEFOXITIN: SIGNIFICANT CHANGE UP
-  CEFTRIAXONE: SIGNIFICANT CHANGE UP
-  CIPROFLOXACIN: SIGNIFICANT CHANGE UP
-  ERTAPENEM: SIGNIFICANT CHANGE UP
-  GENTAMICIN: SIGNIFICANT CHANGE UP
-  IMIPENEM: SIGNIFICANT CHANGE UP
-  LEVOFLOXACIN: SIGNIFICANT CHANGE UP
-  MEROPENEM: SIGNIFICANT CHANGE UP
-  NITROFURANTOIN: SIGNIFICANT CHANGE UP
-  PIPERACILLIN/TAZOBACTAM: SIGNIFICANT CHANGE UP
-  TIGECYCLINE: SIGNIFICANT CHANGE UP
-  TOBRAMYCIN: SIGNIFICANT CHANGE UP
-  TRIMETHOPRIM/SULFAMETHOXAZOLE: SIGNIFICANT CHANGE UP
ANION GAP SERPL CALC-SCNC: 14 MMOL/L — SIGNIFICANT CHANGE UP (ref 7–14)
ANION GAP SERPL CALC-SCNC: 18 MMOL/L — HIGH (ref 7–14)
BUN SERPL-MCNC: 21 MG/DL — HIGH (ref 10–20)
BUN SERPL-MCNC: 26 MG/DL — HIGH (ref 10–20)
CALCIUM SERPL-MCNC: 5.5 MG/DL — CRITICAL LOW (ref 8.5–10.1)
CALCIUM SERPL-MCNC: 5.7 MG/DL — CRITICAL LOW (ref 8.5–10.1)
CHLORIDE SERPL-SCNC: 102 MMOL/L — SIGNIFICANT CHANGE UP (ref 98–110)
CHLORIDE SERPL-SCNC: 102 MMOL/L — SIGNIFICANT CHANGE UP (ref 98–110)
CO2 SERPL-SCNC: 19 MMOL/L — SIGNIFICANT CHANGE UP (ref 17–32)
CO2 SERPL-SCNC: 22 MMOL/L — SIGNIFICANT CHANGE UP (ref 17–32)
CREAT SERPL-MCNC: 0.8 MG/DL — SIGNIFICANT CHANGE UP (ref 0.7–1.5)
CREAT SERPL-MCNC: 0.9 MG/DL — SIGNIFICANT CHANGE UP (ref 0.7–1.5)
CULTURE RESULTS: SIGNIFICANT CHANGE UP
GLUCOSE SERPL-MCNC: 93 MG/DL — SIGNIFICANT CHANGE UP (ref 70–99)
GLUCOSE SERPL-MCNC: 98 MG/DL — SIGNIFICANT CHANGE UP (ref 70–99)
GRAM STN FLD: SIGNIFICANT CHANGE UP
GRAM STN FLD: SIGNIFICANT CHANGE UP
HCT VFR BLD CALC: 27.1 % — LOW (ref 37–47)
HGB BLD-MCNC: 8.9 G/DL — LOW (ref 12–16)
MCHC RBC-ENTMCNC: 31.7 PG — HIGH (ref 27–31)
MCHC RBC-ENTMCNC: 32.8 G/DL — SIGNIFICANT CHANGE UP (ref 32–37)
MCV RBC AUTO: 96.4 FL — SIGNIFICANT CHANGE UP (ref 81–99)
METHOD TYPE: SIGNIFICANT CHANGE UP
NRBC # BLD: 0 /100 WBCS — SIGNIFICANT CHANGE UP (ref 0–0)
OB PNL STL: NEGATIVE — SIGNIFICANT CHANGE UP
ORGANISM # SPEC MICROSCOPIC CNT: SIGNIFICANT CHANGE UP
ORGANISM # SPEC MICROSCOPIC CNT: SIGNIFICANT CHANGE UP
PLATELET # BLD AUTO: 131 K/UL — SIGNIFICANT CHANGE UP (ref 130–400)
POTASSIUM SERPL-MCNC: 3.9 MMOL/L — SIGNIFICANT CHANGE UP (ref 3.5–5)
POTASSIUM SERPL-MCNC: 4 MMOL/L — SIGNIFICANT CHANGE UP (ref 3.5–5)
POTASSIUM SERPL-SCNC: 3.9 MMOL/L — SIGNIFICANT CHANGE UP (ref 3.5–5)
POTASSIUM SERPL-SCNC: 4 MMOL/L — SIGNIFICANT CHANGE UP (ref 3.5–5)
RBC # BLD: 2.81 M/UL — LOW (ref 4.2–5.4)
RBC # FLD: 16.7 % — HIGH (ref 11.5–14.5)
SODIUM SERPL-SCNC: 138 MMOL/L — SIGNIFICANT CHANGE UP (ref 135–146)
SODIUM SERPL-SCNC: 139 MMOL/L — SIGNIFICANT CHANGE UP (ref 135–146)
SPECIMEN SOURCE: SIGNIFICANT CHANGE UP
WBC # BLD: 8.9 K/UL — SIGNIFICANT CHANGE UP (ref 4.8–10.8)
WBC # FLD AUTO: 8.9 K/UL — SIGNIFICANT CHANGE UP (ref 4.8–10.8)

## 2018-05-04 RX ORDER — METOPROLOL TARTRATE 50 MG
25 TABLET ORAL ONCE
Qty: 0 | Refills: 0 | Status: COMPLETED | OUTPATIENT
Start: 2018-05-04 | End: 2018-05-04

## 2018-05-04 RX ORDER — CHOLECALCIFEROL (VITAMIN D3) 125 MCG
1000 CAPSULE ORAL DAILY
Qty: 0 | Refills: 0 | Status: DISCONTINUED | OUTPATIENT
Start: 2018-05-04 | End: 2018-05-08

## 2018-05-04 RX ORDER — CALCIUM CARBONATE 500(1250)
1 TABLET ORAL THREE TIMES A DAY
Qty: 0 | Refills: 0 | Status: DISCONTINUED | OUTPATIENT
Start: 2018-05-04 | End: 2018-05-07

## 2018-05-04 RX ORDER — PANTOPRAZOLE SODIUM 20 MG/1
40 TABLET, DELAYED RELEASE ORAL
Qty: 0 | Refills: 0 | Status: DISCONTINUED | OUTPATIENT
Start: 2018-05-04 | End: 2018-05-08

## 2018-05-04 RX ADMIN — SODIUM CHLORIDE 125 MILLILITER(S): 9 INJECTION INTRAMUSCULAR; INTRAVENOUS; SUBCUTANEOUS at 05:13

## 2018-05-04 RX ADMIN — Medication 5 MILLIGRAM(S): at 05:13

## 2018-05-04 RX ADMIN — METHOCARBAMOL 750 MILLIGRAM(S): 500 TABLET, FILM COATED ORAL at 05:13

## 2018-05-04 RX ADMIN — Medication 1 TABLET(S): at 21:28

## 2018-05-04 RX ADMIN — MORPHINE SULFATE 30 MILLIGRAM(S): 50 CAPSULE, EXTENDED RELEASE ORAL at 17:51

## 2018-05-04 RX ADMIN — Medication 75 MICROGRAM(S): at 05:13

## 2018-05-04 RX ADMIN — MORPHINE SULFATE 30 MILLIGRAM(S): 50 CAPSULE, EXTENDED RELEASE ORAL at 05:13

## 2018-05-04 RX ADMIN — Medication 25 MILLIGRAM(S): at 00:14

## 2018-05-04 RX ADMIN — CEFTRIAXONE 100 GRAM(S): 500 INJECTION, POWDER, FOR SOLUTION INTRAMUSCULAR; INTRAVENOUS at 17:52

## 2018-05-04 RX ADMIN — Medication 1000 UNIT(S): at 17:52

## 2018-05-04 RX ADMIN — METHOCARBAMOL 750 MILLIGRAM(S): 500 TABLET, FILM COATED ORAL at 21:28

## 2018-05-04 RX ADMIN — MORPHINE SULFATE 30 MILLIGRAM(S): 50 CAPSULE, EXTENDED RELEASE ORAL at 05:20

## 2018-05-04 RX ADMIN — METHOCARBAMOL 750 MILLIGRAM(S): 500 TABLET, FILM COATED ORAL at 14:55

## 2018-05-04 RX ADMIN — LORATADINE 10 MILLIGRAM(S): 10 TABLET ORAL at 11:54

## 2018-05-04 RX ADMIN — MONTELUKAST 10 MILLIGRAM(S): 4 TABLET, CHEWABLE ORAL at 11:54

## 2018-05-04 RX ADMIN — CARBIDOPA AND LEVODOPA 1 TABLET(S): 25; 100 TABLET ORAL at 21:28

## 2018-05-04 RX ADMIN — CARBIDOPA AND LEVODOPA 1 TABLET(S): 25; 100 TABLET ORAL at 14:55

## 2018-05-04 RX ADMIN — CARBIDOPA AND LEVODOPA 1 TABLET(S): 25; 100 TABLET ORAL at 05:13

## 2018-05-04 NOTE — CONSULT NOTE ADULT - SUBJECTIVE AND OBJECTIVE BOX
YONATAN MEADE 77yFemalePatient is a 77y old  Female who presents with a chief complaint of Fever (02 May 2018 15:44)      Patient has history of:  adhesives (Pruritus; Rash)  Betadine (Flushing (Skin); Pruritus)  NSAIDs (Vomiting; Rash; Nausea)    Hx of OA - s/p back surgery  Adm with confusion & vomitting     FSH - not relevant    ROS - not contributory       Patient treated with:  cefTRIAXone   IVPB 1 Gram(s) IV Intermittent every 24 hours        PHYSICAL EXAM  T(F): 99.3 (05-04-18 @ 05:32), Max: 99.5 (05-03-18 @ 23:30)  HR: 99 (05-04-18 @ 05:32) (91 - 156)  BP: 145/63 (05-04-18 @ 05:32) (104/58 - 145/63)  RR: 20 (05-04-18 @ 05:32) (16 - 22)  SpO2: 98% (05-04-18 @ 01:00) (89% - 99%)  Daily     Daily   HEENT: normal, no nuchal rigidity  Cor: RSR Nl S1 S2  Lungs: clear  Decreased breath sounds at bases  Abdomen: Nontender, Nl BS,   Ext: No phlebitis     LAB & RADIOLOGIC RESULTS:                        9.7    17.94 )-----------( 139      ( 03 May 2018 06:53 )             29.5         05-03    137  |  101  |  32<H>  ----------------------------<  71  4.1   |  22  |  1.0    Ca    5.7<LL>      03 May 2018 06:53    TPro  x   /  Alb  2.8<L>  /  TBili  x   /  DBili  x   /  AST  x   /  ALT  x   /  AlkPhos  x   05-03      LIVER FUNCTIONS - ( 03 May 2018 06:53 )  Alb: 2.8 g/dL / Pro: x     / ALK PHOS: x     / ALT: x     / AST: x     / GGT: x           PT/INR - ( 02 May 2018 11:30 )   PT: 19.50 sec;   INR: 1.78 ratio         PTT - ( 02 May 2018 11:30 )  PTT:31.1 sec  Provider Contact Note (Other) [TUTU Hutchison] (05-04-18 @ 00:04)  Provider Contact Note (Other) [TUTU Hutchison] (05-03-18 @ 23:57)  Consult Note Adult-Physiatry Attending [BEBETO Krueger] (05-03-18 @ 17:56)  Provider Contact Note (Other) [FRANCO Finekishore] (05-03-18 @ 15:36)  Physical Therapy Initial Evaluation Adult [FRANCO Mena] (05-03-18 @ 14:51)  Consult Note Adult-Pulmonology Attending [RILEY Rock] (05-03-18 @ 11:06)  Care Coordination Assessment [C. Provider] (05-03-18 @ 10:04)  Progress Note Adult-Hospitalist Attending [PAPITO Renteria] (05-03-18 @ 08:49)  Patient Profile Adult [ADALGISA Chávez] (05-02-18 @ 17:26)  H&P Adult [PEDRO. Myra] (05-02-18 @ 15:44)  ED ADULT Nurse Note [MOOKIE Hodge] (05-02-18 @ 14:39)  ED Provider Note [PEE Fox] (05-02-18 @ 14:23)  ED ADULT Triage Note [F. Amico] (05-02-18 @ 11:12)  Discharge Note Adult [MINO More J. Juliano, L. Sorrentino] (04-18-18 @ 12:08)  Progress Notes - Care Coordination [C. Provider] (04-18-18 @ 11:40)  Progress Note Adult-Neurosurgery PA/Attending [JOB More] (04-18-18 @ 10:51)  Progress Notes - Care Coordination [C. Provider] (04-18-18 @ 08:40)  Progress Note Adult-Neurosurgery PA [JOB More] (04-17-18 @ 11:31)  Progress Note Adult-Pulmonology Attending [ANNE MARIE Marshall] (04-16-18 @ 23:18)  Chart Note-Event Note RD [ADALGISA Vigil] (04-16-18 @ 14:37)  Progress Note Adult-Neurosurgery PA [JOB More] (04-16-18 @ 11:08)  Progress Note Adult-Pulmonology Attending [ANNE MARIE Marshall] (04-15-18 @ 18:57)  Progress Note Adult-Neurosurgery Attending [MINO Garcia] (04-15-18 @ 12:36)  Progress Note Adult-Pulmonology Attending [ANNE MARIE Marshall] (04-14-18 @ 17:03)  Progress Note Adult-Neurosurgery Attending [MINO Garcia] (04-14-18 @ 10:54)  Consult Note Adult-Pulmonology Attending [ANNE MARIE Marshall] (04-13-18 @ 15:11)  Progress Note Adult-Neurosurgery Attending [MINO Garcia] (04-13-18 @ 07:14)  Progress Note Adult-Internal Medicine Attending [PEE Landry] (04-12-18 @ 15:02)  Progress Note Adult-Neurosurgery PA [JOB Galvan] (04-12-18 @ 13:13)  Progress Note Adult-Internal Medicine Attending [PEE Landry] (04-11-18 @ 16:55)  Dietitian Initial Evaluation Adult [I. Tymbel] (04-11-18 @ 12:19)  Progress Note Adult-Neurosurgery PA/Attending [JOB More] (04-11-18 @ 12:02)  Consult Note Adult-Internal Medicine Attending [PEE Landry] (04-10-18 @ 16:44)  Progress Note Adult-Neurosurgery PA/Attending [FRANCO Galvan J. Shiau] (04-10-18 @ 09:53)  Physical Therapy Initial Evaluation Adult [GURMEETSaul Reynolds] (04-09-18 @ 13:11)  Progress Note Adult-Neurosurgery PA/Attending [JOB More] (04-09-18 @ 11:23)  Consult Note Adult-Physiatry Attending [ANNE MARIE Arias] (04-09-18 @ 11:20)  Progress Note Adult-Neurosurgery PA/Attending [JOB Montgomery] (04-08-18 @ 09:57)  Progress Note Adult-Neurosurgery PA [JOB Montgomery] (04-07-18 @ 14:03)  Consult Note Adult-Anesthesia Attending [TUTU Benoit] (04-07-18 @ 12:57)  Patient Profile Adult [MINO Arellano] (04-06-18 @ 18:39)  Progress Note Adult-Neurosurgery PA [JOB More] (04-06-18 @ 17:58)  Chart Note-PACU Note CRNA [MOOKIE Werner] (04-06-18 @ 09:46)  ASU Discharge Plan (Adult/Pediatric) [JOB Bender] (04-06-18 @ 09:38)  Brief Operative Note [MINO Garcia] (04-06-18 @ 09:24)  Pre-Anesthesia Evaluation Adult [PEE Tellez] (04-06-18 @ 07:17)  Chart Note-Event Note Attending [MINO Garcia] (04-06-18 @ 07:06)  ASU Preop Checklist [JIMMIE Rivera] (04-06-18 @ 06:55)  ASU Patient Profile, Adult [JOB Rivera] (04-05-18 @ 15:43)  H&P PST Adult [FRANCO More] (03-23-18 @ 12:30)        Urinalysis Basic - ( 02 May 2018 11:30 )    Color: Yellow / Appearance: Hazy / SG: >=1.030 / pH: x  Gluc: x / Ketone: Trace  / Bili: Small / Urobili: 0.2   Blood: x / Protein: >=300 mg/dL / Nitrite: Negative   Leuk Esterase: Small / RBC: 5-10 /HPF / WBC 6-10 /HPF   Sq Epi: x / Non Sq Epi: Many /HPF / Bacteria: Many        Culture - Blood (collected 05-02-18 @ 11:50)  Source: .Blood Blood-Peripheral  Preliminary Report (05-04-18 @ 01:01):    No growth to date.    Culture - Blood (collected 05-02-18 @ 11:40)  Source: .Blood Blood-Peripheral  Gram Stain (05-03-18 @ 12:21):    Growth in anaerobic bottle: Gram Negative Rods  Preliminary Report (05-03-18 @ 12:22):    Growth in anaerobic bottle: Gram Negative Rods    ***Blood Panel PCR results on this specimen are available    approximately 3 hours after the Gram stain result.***    Gram stain, PCR, and/or culture results may not always    correspond due to difference in methodologies.    ************************************************************    This PCR assay was performed using [employer].    The following targets are tested for: Enterococcus,    vancomycin resistant enterococci, Listeria monocytogenes,    coagulase negative staphylococci, S. aureus,    methicillin resistant S. aureus, Streptococcus agalactiae    (Group B), S. pneumoniae, S. pyogenes (Group A),    Acinetobacter baumannii, Enterobacter cloacae, E. coli,    Klebsiella oxytoca, K. pneumoniae, Proteus sp.,    Serratia marcescens, Haemophilus influenzae,    Neisseria meningitidis, Pseudomonas aeruginosa, Candida    albicans, C. glabrata, C krusei, C parapsilosis,    C. tropicalis and the KPC resistance gene.    "Due to technical problems, Proteus sp. will Notbe reported as part of    the BCID panel until further notice"  Organism: Blood Culture PCR (05-03-18 @ 13:54)  Organism: Blood Culture PCR (05-03-18 @ 13:54)      -  Escherichia coli: Detec      Method Type: PCR

## 2018-05-04 NOTE — PROGRESS NOTE ADULT - SUBJECTIVE AND OBJECTIVE BOX
CALLED FOR POSITIVE BLOOD CULTURE-GNR. ALREADY ON ANTIBIOTICS AND SEEMS TO BE DOING BETTER (HAD POSITIVE BLOOD CULTURE PREVIOUSLY). CONTINUE TO MONITOR. ALSO STILL WITH LOW CALCIUM, 5.7

## 2018-05-04 NOTE — PROGRESS NOTE ADULT - SUBJECTIVE AND OBJECTIVE BOX
YONATAN MEADE  77y  Female      Patient is a 77y old  Female who presents with a chief complaint of Fever (02 May 2018 15:44)      INTERVAL HPI/OVERNIGHT EVENTS:  Patient feels better, she is awake, no acute events last night.     Vital Signs Last 24 Hrs  T(C): 36.9 (04 May 2018 11:03), Max: 37.5 (03 May 2018 23:30)  T(F): 98.4 (04 May 2018 11:03), Max: 99.5 (03 May 2018 23:30)  HR: 94 (04 May 2018 11:03) (91 - 156)  BP: 121/58 (04 May 2018 11:03) (104/58 - 145/63)  BP(mean): --  RR: 20 (04 May 2018 05:32) (16 - 22)  SpO2: 98% (04 May 2018 08:13) (97% - 99%)            Consultant(s) Notes Reviewed:  [x ] YES  [ ] NO          MEDICATIONS  (STANDING):  BACItracin   Ointment 1 Application(s) Topical two times a day  carbidopa/levodopa  10/100 1 Tablet(s) Oral three times a day  cefTRIAXone   IVPB 1 Gram(s) IV Intermittent every 24 hours  fentaNYL   Patch  12 MICROgram(s)/Hr 1 Patch Transdermal every 72 hours  heparin  Injectable 5000 Unit(s) SubCutaneous every 12 hours  levothyroxine 75 MICROGram(s) Oral daily  loratadine 10 milliGRAM(s) Oral daily  methocarbamol 750 milliGRAM(s) Oral three times a day  montelukast 10 milliGRAM(s) Oral daily  morphine ER Tablet 30 milliGRAM(s) Oral two times a day  pantoprazole    Tablet 40 milliGRAM(s) Oral before breakfast  predniSONE   Tablet 5 milliGRAM(s) Oral daily  sodium chloride 0.9%. 1000 milliLiter(s) (125 mL/Hr) IV Continuous <Continuous>    MEDICATIONS  (PRN):  acetaminophen   Tablet 325 milliGRAM(s) Oral every 4 hours PRN For Temp greater than 38 C (100.4 F)  bisacodyl 5 milliGRAM(s) Oral daily PRN Constipation  morphine ER Tablet 15 milliGRAM(s) Oral every 12 hours PRN severe breakthrough pain  oxyCODONE    5 mG/acetaminophen 325 mG 1 Tablet(s) Oral every 4 hours PRN Severe Pain (7 - 10)  senna 2 Tablet(s) Oral at bedtime PRN Constipation      LABS                          8.9    8.90  )-----------( 131      ( 04 May 2018 05:57 )             27.1     05-04    139  |  102  |  26<H>  ----------------------------<  93  4.0   |  19  |  0.8    Ca    5.5<LL>      04 May 2018 05:57    TPro  x   /  Alb  2.8<L>  /  TBili  x   /  DBili  x   /  AST  x   /  ALT  x   /  AlkPhos  x   05-03      Urinalysis Basic - ( 02 May 2018 11:30 )    Color: Yellow / Appearance: Hazy / SG: >=1.030 / pH: x  Gluc: x / Ketone: Trace  / Bili: Small / Urobili: 0.2   Blood: x / Protein: >=300 mg/dL / Nitrite: Negative   Leuk Esterase: Small / RBC: 5-10 /HPF / WBC 6-10 /HPF   Sq Epi: x / Non Sq Epi: Many /HPF / Bacteria: Many      PT/INR - ( 02 May 2018 11:30 )   PT: 19.50 sec;   INR: 1.78 ratio         PTT - ( 02 May 2018 11:30 )  PTT:31.1 sec  Lactate Trend  05-02 @ 11:30 Lactate:1.9     CARDIAC MARKERS ( 02 May 2018 11:30 )  x     / <0.01 ng/mL / 99 U/L / x     / x          CAPILLARY BLOOD GLUCOSE  82 (02 May 2018 12:02)          Culture - Urine (collected 05-02-18 @ 12:20)  Source: .Urine Clean Catch (Midstream)  Preliminary Report (05-03-18 @ 18:34):    >100,000 CFU/ml Escherichia coli    Culture - Blood (collected 05-02-18 @ 11:50)  Source: .Blood Blood-Peripheral  Preliminary Report (05-04-18 @ 01:01):    No growth to date.    Culture - Blood (collected 05-02-18 @ 11:40)  Source: .Blood Blood-Peripheral  Gram Stain (05-03-18 @ 12:21):    Growth in anaerobic bottle: Gram Negative Rods  Preliminary Report (05-04-18 @ 10:24):    Growth in anaerobic bottle: Escherichia coli    ***Blood Panel PCR results on this specimen are available    approximately 3 hours after the Gram stain result.***    Gram stain, PCR, and/or culture results may not always    correspond due to difference in methodologies.    ************************************************************    This PCR assay was performed using [employer].    The following targets are tested for: Enterococcus,    vancomycin resistant enterococci, Listeria monocytogenes,    coagulase negative staphylococci, S. aureus,    methicillin resistant S. aureus, Streptococcus agalactiae    (Group B), S. pneumoniae, S. pyogenes (Group A),    Acinetobacter baumannii, Enterobacter cloacae, E. coli,    Klebsiella oxytoca, K. pneumoniae, Proteus sp.,    Serratia marcescens, Haemophilus influenzae,    Neisseria meningitidis, Pseudomonas aeruginosa, Candida    albicans, C. glabrata, C krusei, C parapsilosis,    C. tropicalis and the KPC resistance gene.    "Due to technical problems, Proteus sp. will Not be reported as part of    the BCID panel until further notice"  Organism: Blood Culture PCR (05-03-18 @ 13:54)  Organism: Blood Culture PCR (05-03-18 @ 13:54)      -  Escherichia coli: Detec      Method Type: PCR        RADIOLOGY & ADDITIONAL TESTS:    Imaging Personally Reviewed:  [ ] YES  [ ] NO    HEALTH ISSUES - PROBLEM Dx:  Sepsis due to Escherichia coli: Sepsis due to Escherichia coli  Urinary tract infection, E. coli: Urinary tract infection, E. coli        Physical Exam:   · Constitutional	detailed exam	  · Constitutional Details	no distress	  · Eyes	detailed exam	  · Eyes Details	PERRL	  · Neck	detailed exam	  · Neck Details	supple; no JVD	  · Respiratory	detailed exam	  · Respiratory Details	airway patent; clear to auscultation bilaterally	  · Cardiovascular	detailed exam	  · Cardiovascular Details	regular rate and rhythm	  · Cardiovascular Details	positive S1; positive S2	  · Gastrointestinal	detailed exam	  · GI Normal	soft; nontender; no distention	  · Extremities	detailed exam	  · Extremities Details	no clubbing; no cyanosis; no pedal edema

## 2018-05-04 NOTE — PROGRESS NOTE ADULT - ASSESSMENT
A/P:   1. Sepsis and Bacteremia: Blood culture is growing G neg rods.   2. UTI: E.coli  Continue Rocephin 1 g daily IV,   blood and urine culture are growing E.coli, sensitivity is pending.   CXR showed left lung atelectasis, patient has no symptoms, consider chest CT if no improvement.   ID consult appreciated.     3. Hypoxia:   O2sat 90% on room air. She had recent kyphoplasty and complicated with cement embolization. Pulmonary didn't recommend  anticoagulation at that time.   Consult pulmonary, recommended to continue with oxygen, encourage to move.   Continue nasal canula, incentive spirometry.     4. Anemia:   Hb dropped 9.7 possible dilutional, on 4/11/18 Hb was 10 around baseline.   No acute bleeding, patient denies hematuria or melena.   Check stool guaiac, start on Protonix.   Monitor CBC.     5. Chronic Back pain with vertebra fracture   s/p Kyphoplasty April 2018 for T9, T12, L1, L2 and L3  complicated with   Continue with Morphine ER and Fentanyl patch.     6. Psoriatic arthritis:   continue Prednisone and weekly Methotrexate     7. Parkinson's disease:   continue with Sinemet.     DVT PPX: Heparin SC.   GI PPX: PPI.     Disposition: consult rehab and PT. A/P:   1. Sepsis and Bacteremia: Blood culture is growing G neg rods.   2. UTI: E.coli  Continue Rocephin 1 g daily IV,   blood and urine culture are growing E.coli, sensitivity is pending.   CXR showed left lung atelectasis, patient has no symptoms, consider chest CT if no improvement.   ID consult appreciated.     3. Hypoxia:   O2sat 90% on room air. She had recent kyphoplasty and complicated with cement embolization. Pulmonary didn't recommend  anticoagulation at that time.   Consult pulmonary, recommended to continue with oxygen, encourage to move.   Continue nasal canula, incentive spirometry.     4. Anemia:   Hb dropped 9.7 possible dilutional, on 4/11/18 Hb was 10 around baseline.   No acute bleeding, patient denies hematuria or melena.   Check stool guaiac, start on Protonix.   Monitor CBC.     5. Hypocalcemia   Ca 5.5 corrected with 6.7  Start on Calcium supplement check PTH level.     6. Chronic Back pain with vertebra fracture   s/p Kyphoplasty April 2018 for T9, T12, L1, L2 and L3  complicated with   Continue with Morphine ER and Fentanyl patch.     7. Psoriatic arthritis:   continue Prednisone and weekly Methotrexate     7. Parkinson's disease:   continue with Sinemet.     DVT PPX: Heparin SC.   GI PPX: PPI.     Disposition: consult rehab and PT.

## 2018-05-05 DIAGNOSIS — E83.51 HYPOCALCEMIA: ICD-10-CM

## 2018-05-05 DIAGNOSIS — G20 PARKINSON'S DISEASE: ICD-10-CM

## 2018-05-05 DIAGNOSIS — D64.9 ANEMIA, UNSPECIFIED: ICD-10-CM

## 2018-05-05 LAB
-  AMIKACIN: SIGNIFICANT CHANGE UP
-  AMPICILLIN/SULBACTAM: SIGNIFICANT CHANGE UP
-  AMPICILLIN: SIGNIFICANT CHANGE UP
-  AZTREONAM: SIGNIFICANT CHANGE UP
-  CEFAZOLIN: SIGNIFICANT CHANGE UP
-  CEFEPIME: SIGNIFICANT CHANGE UP
-  CEFOXITIN: SIGNIFICANT CHANGE UP
-  CEFTRIAXONE: SIGNIFICANT CHANGE UP
-  CIPROFLOXACIN: SIGNIFICANT CHANGE UP
-  ERTAPENEM: SIGNIFICANT CHANGE UP
-  GENTAMICIN: SIGNIFICANT CHANGE UP
-  IMIPENEM: SIGNIFICANT CHANGE UP
-  LEVOFLOXACIN: SIGNIFICANT CHANGE UP
-  MEROPENEM: SIGNIFICANT CHANGE UP
-  PIPERACILLIN/TAZOBACTAM: SIGNIFICANT CHANGE UP
-  TOBRAMYCIN: SIGNIFICANT CHANGE UP
-  TRIMETHOPRIM/SULFAMETHOXAZOLE: SIGNIFICANT CHANGE UP
CALCIUM SERPL-MCNC: 6 MG/DL — CRITICAL LOW (ref 8.4–10.5)
CULTURE RESULTS: SIGNIFICANT CHANGE UP
HCT VFR BLD CALC: 26 % — LOW (ref 37–47)
HGB BLD-MCNC: 8.6 G/DL — LOW (ref 12–16)
MCHC RBC-ENTMCNC: 32 PG — HIGH (ref 27–31)
MCHC RBC-ENTMCNC: 33.1 G/DL — SIGNIFICANT CHANGE UP (ref 32–37)
MCV RBC AUTO: 96.7 FL — SIGNIFICANT CHANGE UP (ref 81–99)
METHOD TYPE: SIGNIFICANT CHANGE UP
NRBC # BLD: 0 /100 WBCS — SIGNIFICANT CHANGE UP (ref 0–0)
ORGANISM # SPEC MICROSCOPIC CNT: SIGNIFICANT CHANGE UP
PLATELET # BLD AUTO: 120 K/UL — LOW (ref 130–400)
PTH-INTACT FLD-MCNC: 91 PG/ML — HIGH (ref 15–65)
RBC # BLD: 2.69 M/UL — LOW (ref 4.2–5.4)
RBC # FLD: 16.5 % — HIGH (ref 11.5–14.5)
SPECIMEN SOURCE: SIGNIFICANT CHANGE UP
WBC # BLD: 3.58 K/UL — LOW (ref 4.8–10.8)
WBC # FLD AUTO: 3.58 K/UL — LOW (ref 4.8–10.8)

## 2018-05-05 RX ORDER — SODIUM CHLORIDE 9 MG/ML
1000 INJECTION INTRAMUSCULAR; INTRAVENOUS; SUBCUTANEOUS
Qty: 0 | Refills: 0 | Status: DISCONTINUED | OUTPATIENT
Start: 2018-05-05 | End: 2018-05-05

## 2018-05-05 RX ADMIN — CEFTRIAXONE 100 GRAM(S): 500 INJECTION, POWDER, FOR SOLUTION INTRAMUSCULAR; INTRAVENOUS at 18:18

## 2018-05-05 RX ADMIN — Medication 1 TABLET(S): at 05:05

## 2018-05-05 RX ADMIN — MONTELUKAST 10 MILLIGRAM(S): 4 TABLET, CHEWABLE ORAL at 12:21

## 2018-05-05 RX ADMIN — MORPHINE SULFATE 30 MILLIGRAM(S): 50 CAPSULE, EXTENDED RELEASE ORAL at 05:04

## 2018-05-05 RX ADMIN — HEPARIN SODIUM 5000 UNIT(S): 5000 INJECTION INTRAVENOUS; SUBCUTANEOUS at 05:06

## 2018-05-05 RX ADMIN — METHOCARBAMOL 750 MILLIGRAM(S): 500 TABLET, FILM COATED ORAL at 13:14

## 2018-05-05 RX ADMIN — MORPHINE SULFATE 30 MILLIGRAM(S): 50 CAPSULE, EXTENDED RELEASE ORAL at 18:14

## 2018-05-05 RX ADMIN — FENTANYL CITRATE 1 PATCH: 50 INJECTION INTRAVENOUS at 18:14

## 2018-05-05 RX ADMIN — CARBIDOPA AND LEVODOPA 1 TABLET(S): 25; 100 TABLET ORAL at 21:16

## 2018-05-05 RX ADMIN — SODIUM CHLORIDE 125 MILLILITER(S): 9 INJECTION INTRAMUSCULAR; INTRAVENOUS; SUBCUTANEOUS at 08:30

## 2018-05-05 RX ADMIN — Medication 1 APPLICATION(S): at 18:15

## 2018-05-05 RX ADMIN — METHOCARBAMOL 750 MILLIGRAM(S): 500 TABLET, FILM COATED ORAL at 21:16

## 2018-05-05 RX ADMIN — Medication 75 MICROGRAM(S): at 05:05

## 2018-05-05 RX ADMIN — Medication 1 TABLET(S): at 13:15

## 2018-05-05 RX ADMIN — Medication 1 APPLICATION(S): at 05:05

## 2018-05-05 RX ADMIN — PANTOPRAZOLE SODIUM 40 MILLIGRAM(S): 20 TABLET, DELAYED RELEASE ORAL at 08:30

## 2018-05-05 RX ADMIN — CARBIDOPA AND LEVODOPA 1 TABLET(S): 25; 100 TABLET ORAL at 05:05

## 2018-05-05 RX ADMIN — CARBIDOPA AND LEVODOPA 1 TABLET(S): 25; 100 TABLET ORAL at 13:14

## 2018-05-05 RX ADMIN — Medication 1 TABLET(S): at 21:17

## 2018-05-05 RX ADMIN — METHOCARBAMOL 750 MILLIGRAM(S): 500 TABLET, FILM COATED ORAL at 05:05

## 2018-05-05 RX ADMIN — Medication 5 MILLIGRAM(S): at 05:04

## 2018-05-05 RX ADMIN — SODIUM CHLORIDE 125 MILLILITER(S): 9 INJECTION INTRAMUSCULAR; INTRAVENOUS; SUBCUTANEOUS at 18:16

## 2018-05-05 RX ADMIN — SODIUM CHLORIDE 50 MILLILITER(S): 9 INJECTION INTRAMUSCULAR; INTRAVENOUS; SUBCUTANEOUS at 21:17

## 2018-05-05 RX ADMIN — LORATADINE 10 MILLIGRAM(S): 10 TABLET ORAL at 12:21

## 2018-05-05 RX ADMIN — Medication 1000 UNIT(S): at 12:21

## 2018-05-05 NOTE — PROGRESS NOTE ADULT - ASSESSMENT
Sepsis due to Escherichia coli: Sepsis due to Escherichia coli  Urinary tract infection, E. coli: Urinary tract infection, E. co

## 2018-05-05 NOTE — PROGRESS NOTE ADULT - SUBJECTIVE AND OBJECTIVE BOX
YONATAN MEADE  77y  Female      Patient is a 77y old  Female who presents with a chief complaint of Fever (02 May 2018 15:44)      INTERVAL HPI/OVERNIGHT EVENTS:  none           T(C): 36.4 (05-05-18 @ 10:15), Max: 37.2 (05-05-18 @ 05:26)  HR: 81 (05-05-18 @ 10:15) (81 - 101)  BP: 129/60 (05-05-18 @ 10:15) (121/58 - 129/60)  RR: 16 (05-05-18 @ 10:15) (16 - 18)  SpO2: 99% (05-05-18 @ 07:27) (99% - 99%)  Wt(kg): --  Vital Signs Last 24 Hrs  T(C): 36.4 (05 May 2018 10:15), Max: 37.2 (05 May 2018 05:26)  T(F): 97.5 (05 May 2018 10:15), Max: 98.9 (05 May 2018 05:26)  HR: 81 (05 May 2018 10:15) (81 - 101)  BP: 129/60 (05 May 2018 10:15) (121/58 - 129/60)  BP(mean): --  RR: 16 (05 May 2018 10:15) (16 - 18)  SpO2: 99% (05 May 2018 07:27) (99% - 99%)    PHYSICAL EXAM:  GENERAL: NAD   HEAD:  Atraumatic, Normocephalic  CHEST/LUNG: Clear to auscultation   HEART: S1/S2  ABDOMEN: Soft, Nontender   EXTREMITIES:  no edema    LABS:                          8.6    3.58  )-----------( 120      ( 05 May 2018 06:46 )             26.0     05-04    138  |  102  |  21<H>  ----------------------------<  98  3.9   |  22  |  0.9    Ca    5.7<LL>      04 May 2018 19:24          MEDICATIONS  (STANDING):  BACItracin   Ointment 1 Application(s) Topical two times a day  calcium carbonate 1250 mG (OsCal) 1 Tablet(s) Oral three times a day  carbidopa/levodopa  10/100 1 Tablet(s) Oral three times a day  cefTRIAXone   IVPB 1 Gram(s) IV Intermittent every 24 hours  cholecalciferol 1000 Unit(s) Oral daily  fentaNYL   Patch  12 MICROgram(s)/Hr 1 Patch Transdermal every 72 hours  heparin  Injectable 5000 Unit(s) SubCutaneous every 12 hours  levothyroxine 75 MICROGram(s) Oral daily  loratadine 10 milliGRAM(s) Oral daily  methocarbamol 750 milliGRAM(s) Oral three times a day  montelukast 10 milliGRAM(s) Oral daily  morphine ER Tablet 30 milliGRAM(s) Oral two times a day  pantoprazole    Tablet 40 milliGRAM(s) Oral before breakfast  predniSONE   Tablet 5 milliGRAM(s) Oral daily  sodium chloride 0.9%. 1000 milliLiter(s) (125 mL/Hr) IV Continuous <Continuous>    MEDICATIONS  (PRN):  acetaminophen   Tablet 325 milliGRAM(s) Oral every 4 hours PRN For Temp greater than 38 C (100.4 F)  bisacodyl 5 milliGRAM(s) Oral daily PRN Constipation  morphine ER Tablet 15 milliGRAM(s) Oral every 12 hours PRN severe breakthrough pain  oxyCODONE    5 mG/acetaminophen 325 mG 1 Tablet(s) Oral every 4 hours PRN Severe Pain (7 - 10)  senna 2 Tablet(s) Oral at bedtime PRN Constipation      Culture - Urine (collected 02 May 2018 12:20)  Source: .Urine Clean Catch (Midstream)  Final Report (04 May 2018 22:23):    >100,000 CFU/ml Escherichia coli  Organism: Escherichia coli (04 May 2018 22:23)  Organism: Escherichia coli (04 May 2018 22:23)    Culture - Blood (collected 02 May 2018 11:50)  Source: .Blood Blood-Peripheral  Gram Stain (04 May 2018 21:47):    Growth in anaerobic bottle: Gram Negative Rods    Growth in aerobic bottle: Gram Negative Rods  Preliminary Report (04 May 2018 21:48):    Growth in anaerobic bottle: Gram Negative Rods    Growth in aerobic bottle: Gram Negative Rods    Culture - Blood (collected 02 May 2018 11:40)  Source: .Blood Blood-Peripheral  Gram Stain (03 May 2018 12:21):    Growth in anaerobic bottle: Gram Negative Rods  Preliminary Report (04 May 2018 10:24):    Growth in anaerobic bottle: Escherichia coli    ***Blood Panel PCR results on this specimen are available    approximately 3 hours after the Gram stain result.***    Gram stain, PCR, and/or culture results may not always    correspond due to difference in methodologies.    ************************************************************    This PCR assay was performed using [employer].    The following targets are tested for: Enterococcus,    vancomycin resistant enterococci, Listeria monocytogenes,    coagulase negative staphylococci, S. aureus,    methicillin resistant S. aureus, Streptococcus agalactiae    (Group B), S. pneumoniae, S. pyogenes (Group A),    Acinetobacter baumannii, Enterobacter cloacae, E. coli,    Klebsiella oxytoca, K. pneumoniae, Proteus sp.,    Serratia marcescens, Haemophilus influenzae,    Neisseria meningitidis, Pseudomonas aeruginosa, Candida    albicans, C. glabrata, C krusei, C parapsilosis,    C. tropicalis and the KPC resistance gene.    "Due to technical problems, Proteus sp. will Not be reported as part of    the BCID panel until further notice"  Organism: Blood Culture PCR (03 May 2018 13:54)  Organism: Blood Culture PCR (03 May 2018 13:54)        Consultant(s) Notes Reviewed:  [x ] YES  [ ] NO  Care Discussed with Consultants/Other Providers [ x] YES  [ ] NO        RADIOLOGY & ADDITIONAL TESTS:      Imaging Personally Reviewed:  [ ] YES  [ ] NO    HEALTH ISSUES - PROBLEM Dx:  Sepsis due to Escherichia coli: Sepsis due to Escherichia coli  Urinary tract infection, E. coli: Urinary tract infection, E. coli          Case discussed with housestaff

## 2018-05-06 DIAGNOSIS — E03.9 HYPOTHYROIDISM, UNSPECIFIED: ICD-10-CM

## 2018-05-06 LAB
24R-OH-CALCIDIOL SERPL-MCNC: 28 NG/ML — LOW (ref 30–80)
ANION GAP SERPL CALC-SCNC: 12 MMOL/L — SIGNIFICANT CHANGE UP (ref 7–14)
BUN SERPL-MCNC: 11 MG/DL — SIGNIFICANT CHANGE UP (ref 10–20)
CALCIUM SERPL-MCNC: 5.9 MG/DL — CRITICAL LOW (ref 8.5–10.1)
CHLORIDE SERPL-SCNC: 105 MMOL/L — SIGNIFICANT CHANGE UP (ref 98–110)
CO2 SERPL-SCNC: 25 MMOL/L — SIGNIFICANT CHANGE UP (ref 17–32)
CREAT SERPL-MCNC: 0.7 MG/DL — SIGNIFICANT CHANGE UP (ref 0.7–1.5)
CULTURE RESULTS: SIGNIFICANT CHANGE UP
CULTURE RESULTS: SIGNIFICANT CHANGE UP
GLUCOSE SERPL-MCNC: 97 MG/DL — SIGNIFICANT CHANGE UP (ref 70–99)
GRAM STN FLD: SIGNIFICANT CHANGE UP
HCT VFR BLD CALC: 26.6 % — LOW (ref 37–47)
HGB BLD-MCNC: 8.5 G/DL — LOW (ref 12–16)
MCHC RBC-ENTMCNC: 31.1 PG — HIGH (ref 27–31)
MCHC RBC-ENTMCNC: 32 G/DL — SIGNIFICANT CHANGE UP (ref 32–37)
MCV RBC AUTO: 97.4 FL — SIGNIFICANT CHANGE UP (ref 81–99)
NRBC # BLD: 0 /100 WBCS — SIGNIFICANT CHANGE UP (ref 0–0)
ORGANISM # SPEC MICROSCOPIC CNT: SIGNIFICANT CHANGE UP
PHOSPHATE SERPL-MCNC: 1.4 MG/DL — LOW (ref 2.1–4.9)
PLATELET # BLD AUTO: 104 K/UL — LOW (ref 130–400)
POTASSIUM SERPL-MCNC: 3.6 MMOL/L — SIGNIFICANT CHANGE UP (ref 3.5–5)
POTASSIUM SERPL-SCNC: 3.6 MMOL/L — SIGNIFICANT CHANGE UP (ref 3.5–5)
RBC # BLD: 2.73 M/UL — LOW (ref 4.2–5.4)
RBC # FLD: 16.4 % — HIGH (ref 11.5–14.5)
SODIUM SERPL-SCNC: 142 MMOL/L — SIGNIFICANT CHANGE UP (ref 135–146)
SPECIMEN SOURCE: SIGNIFICANT CHANGE UP
WBC # BLD: 5.22 K/UL — SIGNIFICANT CHANGE UP (ref 4.8–10.8)
WBC # FLD AUTO: 5.22 K/UL — SIGNIFICANT CHANGE UP (ref 4.8–10.8)

## 2018-05-06 RX ADMIN — Medication 1 TABLET(S): at 21:22

## 2018-05-06 RX ADMIN — Medication 1000 UNIT(S): at 11:15

## 2018-05-06 RX ADMIN — LORATADINE 10 MILLIGRAM(S): 10 TABLET ORAL at 11:15

## 2018-05-06 RX ADMIN — CARBIDOPA AND LEVODOPA 1 TABLET(S): 25; 100 TABLET ORAL at 21:22

## 2018-05-06 RX ADMIN — MORPHINE SULFATE 30 MILLIGRAM(S): 50 CAPSULE, EXTENDED RELEASE ORAL at 05:46

## 2018-05-06 RX ADMIN — METHOCARBAMOL 750 MILLIGRAM(S): 500 TABLET, FILM COATED ORAL at 21:22

## 2018-05-06 RX ADMIN — CARBIDOPA AND LEVODOPA 1 TABLET(S): 25; 100 TABLET ORAL at 13:18

## 2018-05-06 RX ADMIN — Medication 1 TABLET(S): at 05:44

## 2018-05-06 RX ADMIN — MONTELUKAST 10 MILLIGRAM(S): 4 TABLET, CHEWABLE ORAL at 11:15

## 2018-05-06 RX ADMIN — MORPHINE SULFATE 30 MILLIGRAM(S): 50 CAPSULE, EXTENDED RELEASE ORAL at 06:16

## 2018-05-06 RX ADMIN — Medication 5 MILLIGRAM(S): at 05:44

## 2018-05-06 RX ADMIN — MORPHINE SULFATE 30 MILLIGRAM(S): 50 CAPSULE, EXTENDED RELEASE ORAL at 17:58

## 2018-05-06 RX ADMIN — PANTOPRAZOLE SODIUM 40 MILLIGRAM(S): 20 TABLET, DELAYED RELEASE ORAL at 05:44

## 2018-05-06 RX ADMIN — Medication 1 TABLET(S): at 13:18

## 2018-05-06 RX ADMIN — METHOCARBAMOL 750 MILLIGRAM(S): 500 TABLET, FILM COATED ORAL at 13:18

## 2018-05-06 RX ADMIN — METHOCARBAMOL 750 MILLIGRAM(S): 500 TABLET, FILM COATED ORAL at 05:44

## 2018-05-06 RX ADMIN — CEFTRIAXONE 100 GRAM(S): 500 INJECTION, POWDER, FOR SOLUTION INTRAMUSCULAR; INTRAVENOUS at 19:01

## 2018-05-06 RX ADMIN — CARBIDOPA AND LEVODOPA 1 TABLET(S): 25; 100 TABLET ORAL at 05:44

## 2018-05-06 RX ADMIN — MORPHINE SULFATE 30 MILLIGRAM(S): 50 CAPSULE, EXTENDED RELEASE ORAL at 20:23

## 2018-05-06 RX ADMIN — Medication 75 MICROGRAM(S): at 05:44

## 2018-05-06 NOTE — PROGRESS NOTE ADULT - ASSESSMENT
Parkinson disease: Parkinson disease  Hypocalcemia: Hypocalcemia  Anemia: Anemia  Sepsis due to Escherichia coli: Sepsis due to Escherichia coli  Urinary tract infection, E. coli: Urinary tract infection, E. coli

## 2018-05-06 NOTE — CONSULT NOTE ADULT - ASSESSMENT
78 yo woman with remote past history of gastric bypass, RA/psoriatic arthritis and severe (established) osteoporosis s/p multiple compression fractures and recent kyphoplasty procedures.  Hypocalcemia with apparent onset 1-2 months ago; note that this is contemporaneous with patient's most recent dose of denosumab.  Patient has secondary hyperparathyroidism related to hypocalcemia.  Vitamin D metabolites, phosphate not measured; in view of gastric bypass need to consider vitamin D and calcium malabsorption.  Note that denosumab can cause hypocalcemia-especially in patients with renal dysfunction-not present here-but vitamin D insufficiency could well be a causative co-factor.    Also, hypothyroidism-on thyroxine.

## 2018-05-06 NOTE — PROGRESS NOTE ADULT - SUBJECTIVE AND OBJECTIVE BOX
YONATAN MEADE  77y  Female      Patient is a 77y old  Female who presents with a chief complaint of Fever (02 May 2018 15:44)      INTERVAL HPI/OVERNIGHT EVENTS:  none          T(C): 36.6 (05-06-18 @ 05:57), Max: 36.6 (05-06-18 @ 05:57)  HR: 91 (05-06-18 @ 05:57) (73 - 93)  BP: 143/61 (05-06-18 @ 05:57) (143/61 - 178/78)  RR: 16 (05-06-18 @ 05:57) (16 - 16)  SpO2: 95% (05-06-18 @ 08:51) (95% - 98%)  Wt(kg): --  Vital Signs Last 24 Hrs  T(C): 36.6 (06 May 2018 05:57), Max: 36.6 (06 May 2018 05:57)  T(F): 97.8 (06 May 2018 05:57), Max: 97.8 (06 May 2018 05:57)  HR: 91 (06 May 2018 05:57) (73 - 93)  BP: 143/61 (06 May 2018 05:57) (143/61 - 178/78)  BP(mean): --  RR: 16 (06 May 2018 05:57) (16 - 16)  SpO2: 95% (06 May 2018 08:51) (95% - 98%)    PHYSICAL EXAM:  GENERAL: NAD   HEAD:  Atraumatic, Normocephalic  CHEST/LUNG: Clear to auscultation  HEART: S1/S2  ABDOMEN: Soft, Nontender   EXTREMITIES:  no edema    LABS:                          8.5    5.22  )-----------( 104      ( 06 May 2018 06:52 )             26.6     05-06    142  |  105  |  11  ----------------------------<  97  3.6   |  25  |  0.7    Ca    5.9<LL>      06 May 2018 06:52          MEDICATIONS  (STANDING):  BACItracin   Ointment 1 Application(s) Topical two times a day  calcium carbonate 1250 mG (OsCal) 1 Tablet(s) Oral three times a day  carbidopa/levodopa  10/100 1 Tablet(s) Oral three times a day  cefTRIAXone   IVPB 1 Gram(s) IV Intermittent every 24 hours  cholecalciferol 1000 Unit(s) Oral daily  fentaNYL   Patch  12 MICROgram(s)/Hr 1 Patch Transdermal every 72 hours  heparin  Injectable 5000 Unit(s) SubCutaneous every 12 hours  levothyroxine 75 MICROGram(s) Oral daily  loratadine 10 milliGRAM(s) Oral daily  methocarbamol 750 milliGRAM(s) Oral three times a day  montelukast 10 milliGRAM(s) Oral daily  morphine ER Tablet 30 milliGRAM(s) Oral two times a day  pantoprazole    Tablet 40 milliGRAM(s) Oral before breakfast  predniSONE   Tablet 5 milliGRAM(s) Oral daily  sodium chloride 0.9%. 1000 milliLiter(s) (125 mL/Hr) IV Continuous <Continuous>    MEDICATIONS  (PRN):  acetaminophen   Tablet 325 milliGRAM(s) Oral every 4 hours PRN For Temp greater than 38 C (100.4 F)  bisacodyl 5 milliGRAM(s) Oral daily PRN Constipation  morphine ER Tablet 15 milliGRAM(s) Oral every 12 hours PRN severe breakthrough pain  oxyCODONE    5 mG/acetaminophen 325 mG 1 Tablet(s) Oral every 4 hours PRN Severe Pain (7 - 10)  senna 2 Tablet(s) Oral at bedtime PRN Constipation      Culture - Blood (collected 04 May 2018 05:57)  Source: .Blood None  Preliminary Report (05 May 2018 17:00):    No growth to date.            Consultant(s) Notes Reviewed:  [x ] YES  [ ] NO  Care Discussed with Consultants/Other Providers [ x] YES  [ ] NO        RADIOLOGY & ADDITIONAL TESTS:      Imaging Personally Reviewed:  [ ] YES  [ ] NO    HEALTH ISSUES - PROBLEM Dx:  Parkinson disease: Parkinson disease  Hypocalcemia: Hypocalcemia  Anemia: Anemia  Sepsis due to Escherichia coli: Sepsis due to Escherichia coli  Urinary tract infection, E. coli: Urinary tract infection, E. coli          Case discussed with housestaff

## 2018-05-06 NOTE — CONSULT NOTE ADULT - SUBJECTIVE AND OBJECTIVE BOX
HPI:  This is 76 yo female with PMH of Parkinson's Disease, Hypothyroidism, Severe OA, low back pain, Fibromyalgia was referred from [facility] for fever 101F. She was sent from [facility] two weeks ago for rehab following admission for multiple kyphoplasty procedures (T9, T12, L1, L2, L3). She was supposed to discharge home today, but she developed fever and weakness. She denies cough, her daughter stated she choked yesterday when she tried to swallow Tylenol. She denies any acute pain except her chronic skeletal pain.    In the Ed her labs showed leukocytosis, UA mild UTI, CXR left atelectasis. (02 May 2018 15:44)      PAST MEDICAL & SURGICAL HISTORY:  Seasonal allergies  DWAYNE on CPAP  Obesity: s/p gastric bypass ~15 yrs ago ~100 lb loss  Osteoarthritis  Rheumatoid arthritis  Fibromyalgia  Tremors of nervous system: essential tremors  Psoriatic arthritis  GERD (gastroesophageal reflux disease)  Hypothyroidism: no recent dosage changes  Ankle deformity: s/p b/l surgical repair  H/O gastric bypass      FAMILY HISTORY:  No pertinent family history in first degree relatives      Social History:    Outpatient Medications:    MEDICATIONS  (STANDING):  BACItracin   Ointment 1 Application(s) Topical two times a day  calcium carbonate 1250 mG (OsCal) 1 Tablet(s) Oral three times a day  carbidopa/levodopa  10/100 1 Tablet(s) Oral three times a day  cefTRIAXone   IVPB 1 Gram(s) IV Intermittent every 24 hours  cholecalciferol 1000 Unit(s) Oral daily  fentaNYL   Patch  12 MICROgram(s)/Hr 1 Patch Transdermal every 72 hours  heparin  Injectable 5000 Unit(s) SubCutaneous every 12 hours  levothyroxine 75 MICROGram(s) Oral daily  loratadine 10 milliGRAM(s) Oral daily  methocarbamol 750 milliGRAM(s) Oral three times a day  montelukast 10 milliGRAM(s) Oral daily  morphine ER Tablet 30 milliGRAM(s) Oral two times a day  pantoprazole    Tablet 40 milliGRAM(s) Oral before breakfast  predniSONE   Tablet 5 milliGRAM(s) Oral daily  sodium chloride 0.9%. 1000 milliLiter(s) (125 mL/Hr) IV Continuous <Continuous>  Denosumab (Prolia) 60 mg q6months; has received 2 doses, most recent approx 1 month ago    MEDICATIONS  (PRN):  acetaminophen   Tablet 325 milliGRAM(s) Oral every 4 hours PRN For Temp greater than 38 C (100.4 F)  bisacodyl 5 milliGRAM(s) Oral daily PRN Constipation  morphine ER Tablet 15 milliGRAM(s) Oral every 12 hours PRN severe breakthrough pain  oxyCODONE    5 mG/acetaminophen 325 mG 1 Tablet(s) Oral every 4 hours PRN Severe Pain (7 - 10)  senna 2 Tablet(s) Oral at bedtime PRN Constipation      Allergies    adhesives (Pruritus; Rash)  Betadine (Flushing (Skin); Pruritus)  NSAIDs (Vomiting; Rash; Nausea)    Intolerances      Review of Systems:  Constitutional: No fever  Eyes: No blurry vision  Neuro: No tremors, denies dysthesias or muscle cramps  HEENT: No pain  Cardiovascular: No chest pain, palpitations  Respiratory: No SOB, no cough  GI: No nausea, vomiting, abdominal pain; denies diarrhea or symptoms suggesting malabsorption  : No dysuria  Skin: no rash  Psych: no depression  Endocrine: no polyuria, polydipsia  Hem/lymph: no swelling  Osteoporosis: multiple spinal compression fractures; pain relief following kyphoplasties  ALL OTHER SYSTEMS REVIEWED AND NEGATIVE      PHYSICAL EXAM:  VITALS: T(C): 36.2 (05-06-18 @ 14:25)  T(F): 97.1 (05-06-18 @ 14:25), Max: 97.8 (05-06-18 @ 05:57)  HR: 82 (05-06-18 @ 14:25) (82 - 93)  BP: 133/62 (05-06-18 @ 14:25) (133/62 - 178/78)  RR:  (16 - 16)    GENERAL: NAD, well-groomed, well-developed  EYES: No proptosis, no lid lag, anicteric  HEENT:  Atraumatic, Normocephalic, moist mucous membranes  THYROID: Normal size, no palpable nodules  RESPIRATORY: Clear to auscultation bilaterally; No rales, rhonchi, wheezing, or rubs  CARDIOVASCULAR: Regular rate and rhythm; No murmurs; no peripheral edema  GI: Soft, nontender, non distended, normal bowel sounds  SKIN: Dry, intact, No rashes or lesions  MUSCULOSKELETAL: Full range of motion, normal strength  NEURO: sensation intact, extraocular movements intact, no tremor, normal reflexes  PSYCH: Alert and oriented x 3, normal affect, normal mood  CUSHING'S SIGNS: no striae  Chvostek sign: absent                            8.5    5.22  )-----------( 104      ( 06 May 2018 06:52 )             26.6       05-06    142  |  105  |  11  ----------------------------<  97  3.6   |  25  |  0.7    EGFR if : 98  EGFR if non : 85    05/06: Calcium 5.9; creat 0.7  05/04: PTH 91; calcium  6.0 (05/03: albumin 2.8)  04/11: calcium 6.6 albumin 3.1  03/23L calcium 9.1, albumin 4.3

## 2018-05-07 LAB
ANION GAP SERPL CALC-SCNC: 11 MMOL/L — SIGNIFICANT CHANGE UP (ref 7–14)
BUN SERPL-MCNC: 9 MG/DL — LOW (ref 10–20)
CALCIUM SERPL-MCNC: 6.4 MG/DL — LOW (ref 8.5–10.1)
CHLORIDE SERPL-SCNC: 105 MMOL/L — SIGNIFICANT CHANGE UP (ref 98–110)
CO2 SERPL-SCNC: 26 MMOL/L — SIGNIFICANT CHANGE UP (ref 17–32)
CREAT SERPL-MCNC: 0.6 MG/DL — LOW (ref 0.7–1.5)
CULTURE RESULTS: SIGNIFICANT CHANGE UP
GLUCOSE SERPL-MCNC: 98 MG/DL — SIGNIFICANT CHANGE UP (ref 70–99)
HCT VFR BLD CALC: 25.6 % — LOW (ref 37–47)
HGB BLD-MCNC: 8.3 G/DL — LOW (ref 12–16)
MAGNESIUM SERPL-MCNC: 1.6 MG/DL — LOW (ref 1.8–2.4)
MCHC RBC-ENTMCNC: 31.3 PG — HIGH (ref 27–31)
MCHC RBC-ENTMCNC: 32.4 G/DL — SIGNIFICANT CHANGE UP (ref 32–37)
MCV RBC AUTO: 96.6 FL — SIGNIFICANT CHANGE UP (ref 81–99)
NRBC # BLD: 0 /100 WBCS — SIGNIFICANT CHANGE UP (ref 0–0)
PLATELET # BLD AUTO: 83 K/UL — LOW (ref 130–400)
POTASSIUM SERPL-MCNC: 3.4 MMOL/L — LOW (ref 3.5–5)
POTASSIUM SERPL-SCNC: 3.4 MMOL/L — LOW (ref 3.5–5)
RBC # BLD: 2.65 M/UL — LOW (ref 4.2–5.4)
RBC # FLD: 16.2 % — HIGH (ref 11.5–14.5)
SODIUM SERPL-SCNC: 142 MMOL/L — SIGNIFICANT CHANGE UP (ref 135–146)
VIT D25+D1,25 OH+D1,25 PNL SERPL-MCNC: 112 PG/ML — HIGH (ref 19.9–79.3)
WBC # BLD: 5.34 K/UL — SIGNIFICANT CHANGE UP (ref 4.8–10.8)
WBC # FLD AUTO: 5.34 K/UL — SIGNIFICANT CHANGE UP (ref 4.8–10.8)

## 2018-05-07 RX ORDER — MAGNESIUM SULFATE 500 MG/ML
2 VIAL (ML) INJECTION ONCE
Qty: 0 | Refills: 0 | Status: COMPLETED | OUTPATIENT
Start: 2018-05-07 | End: 2018-05-07

## 2018-05-07 RX ORDER — POTASSIUM CHLORIDE 20 MEQ
40 PACKET (EA) ORAL ONCE
Qty: 0 | Refills: 0 | Status: COMPLETED | OUTPATIENT
Start: 2018-05-07 | End: 2018-05-07

## 2018-05-07 RX ORDER — CALCITRIOL 0.5 UG/1
0.25 CAPSULE ORAL
Qty: 0 | Refills: 0 | Status: DISCONTINUED | OUTPATIENT
Start: 2018-05-07 | End: 2018-05-08

## 2018-05-07 RX ORDER — MAGNESIUM OXIDE 400 MG ORAL TABLET 241.3 MG
400 TABLET ORAL DAILY
Qty: 0 | Refills: 0 | Status: DISCONTINUED | OUTPATIENT
Start: 2018-05-07 | End: 2018-05-08

## 2018-05-07 RX ADMIN — MORPHINE SULFATE 30 MILLIGRAM(S): 50 CAPSULE, EXTENDED RELEASE ORAL at 18:48

## 2018-05-07 RX ADMIN — MAGNESIUM OXIDE 400 MG ORAL TABLET 400 MILLIGRAM(S): 241.3 TABLET ORAL at 14:47

## 2018-05-07 RX ADMIN — Medication 75 MICROGRAM(S): at 05:02

## 2018-05-07 RX ADMIN — Medication 5 MILLIGRAM(S): at 05:02

## 2018-05-07 RX ADMIN — CARBIDOPA AND LEVODOPA 1 TABLET(S): 25; 100 TABLET ORAL at 21:07

## 2018-05-07 RX ADMIN — CARBIDOPA AND LEVODOPA 1 TABLET(S): 25; 100 TABLET ORAL at 13:53

## 2018-05-07 RX ADMIN — Medication 1 TABLET(S): at 05:02

## 2018-05-07 RX ADMIN — LORATADINE 10 MILLIGRAM(S): 10 TABLET ORAL at 11:51

## 2018-05-07 RX ADMIN — Medication 1 APPLICATION(S): at 18:48

## 2018-05-07 RX ADMIN — MORPHINE SULFATE 30 MILLIGRAM(S): 50 CAPSULE, EXTENDED RELEASE ORAL at 05:34

## 2018-05-07 RX ADMIN — Medication 40 MILLIEQUIVALENT(S): at 11:50

## 2018-05-07 RX ADMIN — Medication 50 GRAM(S): at 11:50

## 2018-05-07 RX ADMIN — METHOCARBAMOL 750 MILLIGRAM(S): 500 TABLET, FILM COATED ORAL at 21:07

## 2018-05-07 RX ADMIN — METHOCARBAMOL 750 MILLIGRAM(S): 500 TABLET, FILM COATED ORAL at 13:53

## 2018-05-07 RX ADMIN — PANTOPRAZOLE SODIUM 40 MILLIGRAM(S): 20 TABLET, DELAYED RELEASE ORAL at 05:02

## 2018-05-07 RX ADMIN — CALCITRIOL 0.25 MICROGRAM(S): 0.5 CAPSULE ORAL at 18:48

## 2018-05-07 RX ADMIN — Medication 1000 UNIT(S): at 11:51

## 2018-05-07 RX ADMIN — METHOCARBAMOL 750 MILLIGRAM(S): 500 TABLET, FILM COATED ORAL at 05:02

## 2018-05-07 RX ADMIN — MONTELUKAST 10 MILLIGRAM(S): 4 TABLET, CHEWABLE ORAL at 11:51

## 2018-05-07 RX ADMIN — MORPHINE SULFATE 30 MILLIGRAM(S): 50 CAPSULE, EXTENDED RELEASE ORAL at 05:04

## 2018-05-07 RX ADMIN — CARBIDOPA AND LEVODOPA 1 TABLET(S): 25; 100 TABLET ORAL at 05:02

## 2018-05-07 RX ADMIN — Medication 1 TABLET(S): at 18:48

## 2018-05-07 NOTE — PROGRESS NOTE ADULT - SUBJECTIVE AND OBJECTIVE BOX
Patient has a history of hypocalcemia following denosumab injection for severe osteoporosis. she is also taking pantoprazole which can cause hypomagnesemia, recent labs show very low serum albumin, calcium, high 1,25 vitamin d level, and slightly low 25 hydroxyvitamin d level.

## 2018-05-07 NOTE — PROGRESS NOTE ADULT - SUBJECTIVE AND OBJECTIVE BOX
YONATAN MEADE  77y  Female      Patient is a 77y old  Female who presents with a chief complaint of Fever (02 May 2018 15:44)      INTERVAL HPI/OVERNIGHT EVENTS:  none          T(C): 36.2 (05-07-18 @ 06:03), Max: 37.7 (05-06-18 @ 22:52)  HR: 88 (05-07-18 @ 06:40) (76 - 100)  BP: 169/70 (05-07-18 @ 06:40) (133/62 - 187/81)  RR: 16 (05-07-18 @ 06:03) (16 - 16)  SpO2: 95% (05-07-18 @ 08:09) (95% - 98%)  Wt(kg): --  Vital Signs Last 24 Hrs  T(C): 36.2 (07 May 2018 06:03), Max: 37.7 (06 May 2018 22:52)  T(F): 97.2 (07 May 2018 06:03), Max: 99.8 (06 May 2018 22:52)  HR: 88 (07 May 2018 06:40) (76 - 100)  BP: 169/70 (07 May 2018 06:40) (133/62 - 187/81)  BP(mean): --  RR: 16 (07 May 2018 06:03) (16 - 16)  SpO2: 95% (07 May 2018 08:09) (95% - 98%)    PHYSICAL EXAM:  GENERAL: NAD   HEAD:  Atraumatic, Normocephalic  CHEST/LUNG: Clear to auscultation   HEART: S1/S2  ABDOMEN: Soft, Nontender, obese  EXTREMITIES:  no edema    LABS:                          8.3    5.34  )-----------( 83       ( 07 May 2018 06:24 )             25.6     05-07    142  |  105  |  9<L>  ----------------------------<  98  3.4<L>   |  26  |  0.6<L>    Ca    6.4<L>      07 May 2018 06:24  Phos  1.4     05-06  Mg     1.6     05-07          MEDICATIONS  (STANDING):  BACItracin   Ointment 1 Application(s) Topical two times a day  calcitriol   Capsule 0.25 MICROGram(s) Oral two times a day  carbidopa/levodopa  10/100 1 Tablet(s) Oral three times a day  cefTRIAXone   IVPB 1 Gram(s) IV Intermittent every 24 hours  cholecalciferol 1000 Unit(s) Oral daily  fentaNYL   Patch  12 MICROgram(s)/Hr 1 Patch Transdermal every 72 hours  heparin  Injectable 5000 Unit(s) SubCutaneous every 12 hours  levothyroxine 75 MICROGram(s) Oral daily  loratadine 10 milliGRAM(s) Oral daily  magnesium sulfate  IVPB 2 Gram(s) IV Intermittent once  methocarbamol 750 milliGRAM(s) Oral three times a day  montelukast 10 milliGRAM(s) Oral daily  morphine ER Tablet 30 milliGRAM(s) Oral two times a day  pantoprazole    Tablet 40 milliGRAM(s) Oral before breakfast  potassium chloride    Tablet ER 40 milliEquivalent(s) Oral once  predniSONE   Tablet 5 milliGRAM(s) Oral daily    MEDICATIONS  (PRN):  acetaminophen   Tablet 325 milliGRAM(s) Oral every 4 hours PRN For Temp greater than 38 C (100.4 F)  bisacodyl 5 milliGRAM(s) Oral daily PRN Constipation  morphine ER Tablet 15 milliGRAM(s) Oral every 12 hours PRN severe breakthrough pain  oxyCODONE    5 mG/acetaminophen 325 mG 1 Tablet(s) Oral every 4 hours PRN Severe Pain (7 - 10)  senna 2 Tablet(s) Oral at bedtime PRN Constipation        Consultant(s) Notes Reviewed:  [x ] YES  [ ] NO  Care Discussed with Consultants/Other Providers [ x] YES  [ ] NO        RADIOLOGY & ADDITIONAL TESTS:      Imaging Personally Reviewed:  [ ] YES  [ ] NO    HEALTH ISSUES - PROBLEM Dx:  Hypothyroidism: Hypothyroidism  Parkinson disease: Parkinson disease  Hypocalcemia: Hypocalcemia  Anemia: Anemia  Sepsis due to Escherichia coli: Sepsis due to Escherichia coli  Urinary tract infection, E. coli: Urinary tract infection, E. coli          Case discussed with housestaff

## 2018-05-07 NOTE — PROGRESS NOTE ADULT - ATTENDING COMMENTS
would stop pantoprazole, or switch to ranitidine if acid production needs to be blocked as P.P.I's can cause or worsen hypumagnesemia, also hold denosumab which is a six monthly injection any way until patient's case is reviewed in 6 months by an endocrinologist. no further therapy for now.

## 2018-05-07 NOTE — PROGRESS NOTE ADULT - ASSESSMENT
Hypothyroidism: Hypothyroidism  Parkinson disease: Parkinson disease  Hypocalcemia: Hypocalcemia  Anemia: Anemia  Sepsis due to Escherichia coli: Sepsis due to Escherichia coli  Urinary tract infection, E. coli: Urinary tract infection, E. coli

## 2018-05-08 ENCOUNTER — TRANSCRIPTION ENCOUNTER (OUTPATIENT)
Age: 78
End: 2018-05-08

## 2018-05-08 VITALS — OXYGEN SATURATION: 93 %

## 2018-05-08 LAB
ANION GAP SERPL CALC-SCNC: 13 MMOL/L — SIGNIFICANT CHANGE UP (ref 7–14)
BUN SERPL-MCNC: 6 MG/DL — LOW (ref 10–20)
CALCIUM SERPL-MCNC: 6.5 MG/DL — LOW (ref 8.5–10.1)
CHLORIDE SERPL-SCNC: 101 MMOL/L — SIGNIFICANT CHANGE UP (ref 98–110)
CO2 SERPL-SCNC: 26 MMOL/L — SIGNIFICANT CHANGE UP (ref 17–32)
CREAT SERPL-MCNC: 0.7 MG/DL — SIGNIFICANT CHANGE UP (ref 0.7–1.5)
GLUCOSE SERPL-MCNC: 108 MG/DL — HIGH (ref 70–99)
HCT VFR BLD CALC: 26.6 % — LOW (ref 37–47)
HGB BLD-MCNC: 8.6 G/DL — LOW (ref 12–16)
MAGNESIUM SERPL-MCNC: 1.6 MG/DL — LOW (ref 1.8–2.4)
MCHC RBC-ENTMCNC: 31 PG — SIGNIFICANT CHANGE UP (ref 27–31)
MCHC RBC-ENTMCNC: 32.3 G/DL — SIGNIFICANT CHANGE UP (ref 32–37)
MCV RBC AUTO: 96 FL — SIGNIFICANT CHANGE UP (ref 81–99)
NRBC # BLD: 0 /100 WBCS — SIGNIFICANT CHANGE UP (ref 0–0)
PLATELET # BLD AUTO: 91 K/UL — LOW (ref 130–400)
POTASSIUM SERPL-MCNC: 3.6 MMOL/L — SIGNIFICANT CHANGE UP (ref 3.5–5)
POTASSIUM SERPL-SCNC: 3.6 MMOL/L — SIGNIFICANT CHANGE UP (ref 3.5–5)
RBC # BLD: 2.77 M/UL — LOW (ref 4.2–5.4)
RBC # FLD: 16.1 % — HIGH (ref 11.5–14.5)
SODIUM SERPL-SCNC: 140 MMOL/L — SIGNIFICANT CHANGE UP (ref 135–146)
WBC # BLD: 3.76 K/UL — LOW (ref 4.8–10.8)
WBC # FLD AUTO: 3.76 K/UL — LOW (ref 4.8–10.8)

## 2018-05-08 RX ORDER — MONTELUKAST 4 MG/1
1 TABLET, CHEWABLE ORAL
Qty: 0 | Refills: 0 | COMMUNITY

## 2018-05-08 RX ORDER — MILNACIPRAN HYDROCHLORIDE 50 MG/1
1 TABLET, FILM COATED ORAL
Qty: 0 | Refills: 0 | COMMUNITY

## 2018-05-08 RX ORDER — MAGNESIUM OXIDE 400 MG ORAL TABLET 241.3 MG
400 TABLET ORAL ONCE
Qty: 0 | Refills: 0 | Status: DISCONTINUED | OUTPATIENT
Start: 2018-05-08 | End: 2018-05-08

## 2018-05-08 RX ORDER — CETIRIZINE HYDROCHLORIDE 10 MG/1
1 TABLET ORAL
Qty: 0 | Refills: 0 | COMMUNITY

## 2018-05-08 RX ADMIN — Medication 1 TABLET(S): at 05:42

## 2018-05-08 RX ADMIN — CALCITRIOL 0.25 MICROGRAM(S): 0.5 CAPSULE ORAL at 05:42

## 2018-05-08 RX ADMIN — METHOCARBAMOL 750 MILLIGRAM(S): 500 TABLET, FILM COATED ORAL at 05:42

## 2018-05-08 RX ADMIN — PANTOPRAZOLE SODIUM 40 MILLIGRAM(S): 20 TABLET, DELAYED RELEASE ORAL at 05:48

## 2018-05-08 RX ADMIN — MORPHINE SULFATE 30 MILLIGRAM(S): 50 CAPSULE, EXTENDED RELEASE ORAL at 05:45

## 2018-05-08 RX ADMIN — Medication 75 MICROGRAM(S): at 05:42

## 2018-05-08 RX ADMIN — Medication 5 MILLIGRAM(S): at 05:42

## 2018-05-08 RX ADMIN — CARBIDOPA AND LEVODOPA 1 TABLET(S): 25; 100 TABLET ORAL at 05:42

## 2018-05-08 NOTE — DISCHARGE NOTE ADULT - PLAN OF CARE
resolution of symptoms continue meds, follow up with PMD for further management monitor level follow up with PMD and with endocrinologist.

## 2018-05-08 NOTE — DISCHARGE NOTE ADULT - PATIENT PORTAL LINK FT
You can access the [facility], offered by [facility], by registering with the following website: [email or web address]

## 2018-05-08 NOTE — DISCHARGE NOTE ADULT - CARE PROVIDERS DIRECT ADDRESSES
,DirectAddress_Unknown,DirectAddress_Unknown,[email or web address]

## 2018-05-08 NOTE — DISCHARGE NOTE ADULT - HOSPITAL COURSE
pt admitted with fevers, UTI, found to have e coli cultures positive. seen by ID, antibiotics switched to PO, cultures pansensitive. Pt also found to have hypocalcemia, maybe medication induced, follow up with PMD and with endocrinology. hold PPI. repeat bloodwork within 1 week.  complete course of antibiotics.

## 2018-05-08 NOTE — DISCHARGE NOTE ADULT - CARE PROVIDER_API CALL
Jennifer Moncada (), Internal Medicine  [address] [place] [address]  Phone: [phone]  Fax: [phone]    Jeremiah Dawson), EndocrinologyMetabDiabetes; Internal Medicine  [address] [place] [address]  Phone: [phone]  Fax: [phone]    Wagner Fulton), Internal Medicine; Rheumatology  [address] [place] [address]  Phone: [phone]  Fax: [phone]

## 2018-05-08 NOTE — DISCHARGE NOTE ADULT - MEDICATION SUMMARY - MEDICATIONS TO TAKE
I will START or STAY ON the medications listed below when I get home from the hospital:    predniSONE 5 mg oral delayed release tablet  -- 5 milligram(s) by mouth once a day  -- Indication: For Home meds    morphine 30 mg/12 hr oral tablet, extended release  -- 1 tab(s) by mouth 2 times a day  -- Indication: For chronic pain    morphine 15 mg/12 hr oral tablet, extended release  -- 1 tab(s) by mouth every 12 hours, As needed, severe breakthrough pain  -- Indication: For Pain    oxyCODONE-acetaminophen 5 mg-325 mg oral tablet  -- 1 tab(s) by mouth every 4 hours, As needed, Severe Pain (7 - 10)  -- Indication: For Pain    fentaNYL 12 mcg/hr transdermal film, extended release  -- 1 patch by transdermal patch every 72 hours  -- Indication: For Pain    Savella 25 mg oral tablet  -- 1 tab(s) by mouth 2 times a day  -- Indication: For Home meds    methotrexate 10 mg oral tablet  -- orally once a week (q12h) on tuesdays  -- Indication: For Home meds    carbidopa-levodopa 10 mg-100 mg oral tablet  -- orally 3 times a day  -- Indication: For Parkinson disease    lidocaine 5% topical film  -- Apply on skin to affected area once a day -for moderate pain   -- Indication: For chronic pain    bacitracin 500 units/g topical ointment  -- 1 application on skin 2 times a day  -- Indication: For Skin care    Actemra 20 mg/mL intravenous solution  -- intravenous once a month, last infusion ~2 wks ago  -- Indication: For Home meds    montelukast 10 mg oral tablet  -- 1 tab(s) by mouth once a day  -- Indication: For Home meds    methocarbamol 750 mg oral tablet  -- 1 tab(s) by mouth every 8 hours   -- May cause drowsiness.  Alcohol may intensify this effect.  Use care when operating dangerous machinery.    -- Indication: For Pain    sulfamethoxazole-trimethoprim 800 mg-160 mg oral tablet  -- 1 tab(s) by mouth 2 times a day  -- Indication: For Uti    levothyroxine 75 mcg (0.075 mg) oral tablet  -- 1 tab(s) by mouth once a day  -- Indication: For Hypothyroidism

## 2018-05-09 LAB
CULTURE RESULTS: SIGNIFICANT CHANGE UP
SPECIMEN SOURCE: SIGNIFICANT CHANGE UP

## 2018-05-10 ENCOUNTER — INPATIENT (INPATIENT)
Facility: HOSPITAL | Age: 78
LOS: 5 days | Discharge: HOME HEALTH | End: 2018-05-16
Attending: INTERNAL MEDICINE | Admitting: INTERNAL MEDICINE

## 2018-05-10 VITALS
OXYGEN SATURATION: 100 % | SYSTOLIC BLOOD PRESSURE: 169 MMHG | RESPIRATION RATE: 22 BRPM | DIASTOLIC BLOOD PRESSURE: 80 MMHG | HEART RATE: 104 BPM | TEMPERATURE: 100 F

## 2018-05-10 DIAGNOSIS — Z98.84 BARIATRIC SURGERY STATUS: Chronic | ICD-10-CM

## 2018-05-10 DIAGNOSIS — M21.969 UNSPECIFIED ACQUIRED DEFORMITY OF UNSPECIFIED LOWER LEG: Chronic | ICD-10-CM

## 2018-05-10 LAB
ALBUMIN SERPL ELPH-MCNC: 3.2 G/DL — LOW (ref 3.5–5.2)
ALP SERPL-CCNC: 86 U/L — SIGNIFICANT CHANGE UP (ref 30–115)
ALT FLD-CCNC: 23 U/L — SIGNIFICANT CHANGE UP (ref 0–41)
ANION GAP SERPL CALC-SCNC: 17 MMOL/L — HIGH (ref 7–14)
APPEARANCE UR: CLEAR — SIGNIFICANT CHANGE UP
APTT BLD: SIGNIFICANT CHANGE UP SEC (ref 27–39.2)
AST SERPL-CCNC: 69 U/L — HIGH (ref 0–41)
BACTERIA # UR AUTO: (no result) /HPF
BASE EXCESS BLDV CALC-SCNC: 4.5 MMOL/L — HIGH (ref -2–2)
BASOPHILS # BLD AUTO: 0.01 K/UL — SIGNIFICANT CHANGE UP (ref 0–0.2)
BASOPHILS NFR BLD AUTO: 0.3 % — SIGNIFICANT CHANGE UP (ref 0–1)
BILIRUB SERPL-MCNC: 0.6 MG/DL — SIGNIFICANT CHANGE UP (ref 0.2–1.2)
BILIRUB UR-MCNC: NEGATIVE — SIGNIFICANT CHANGE UP
BUN SERPL-MCNC: 7 MG/DL — LOW (ref 10–20)
CA-I SERPL-SCNC: 0.95 MMOL/L — LOW (ref 1.12–1.3)
CALCIUM SERPL-MCNC: 7.1 MG/DL — LOW (ref 8.5–10.1)
CHLORIDE SERPL-SCNC: 92 MMOL/L — LOW (ref 98–110)
CK MB CFR SERPL CALC: 1.4 NG/ML — SIGNIFICANT CHANGE UP (ref 0.6–6.3)
CK SERPL-CCNC: 139 U/L — SIGNIFICANT CHANGE UP (ref 0–225)
CO2 SERPL-SCNC: 25 MMOL/L — SIGNIFICANT CHANGE UP (ref 17–32)
COLOR SPEC: YELLOW — SIGNIFICANT CHANGE UP
CREAT SERPL-MCNC: 0.8 MG/DL — SIGNIFICANT CHANGE UP (ref 0.7–1.5)
DIFF PNL FLD: NEGATIVE — SIGNIFICANT CHANGE UP
EOSINOPHIL # BLD AUTO: 0.01 K/UL — SIGNIFICANT CHANGE UP (ref 0–0.7)
EOSINOPHIL NFR BLD AUTO: 0.3 % — SIGNIFICANT CHANGE UP (ref 0–8)
EPI CELLS # UR: (no result) /HPF
GAS PNL BLDV: 134 MMOL/L — LOW (ref 136–145)
GAS PNL BLDV: SIGNIFICANT CHANGE UP
GLUCOSE SERPL-MCNC: 69 MG/DL — LOW (ref 70–99)
GLUCOSE UR QL: NEGATIVE MG/DL — SIGNIFICANT CHANGE UP
HCO3 BLDV-SCNC: 29 MMOL/L — SIGNIFICANT CHANGE UP (ref 22–29)
HCT VFR BLD CALC: 31.9 % — LOW (ref 37–47)
HCT VFR BLDA CALC: 48.6 % — HIGH (ref 34–44)
HGB BLD CALC-MCNC: 15.9 G/DL — SIGNIFICANT CHANGE UP (ref 14–18)
HGB BLD-MCNC: 10.3 G/DL — LOW (ref 12–16)
IMM GRANULOCYTES NFR BLD AUTO: 0.6 % — HIGH (ref 0.1–0.3)
INR BLD: 1.31 RATIO — HIGH (ref 0.65–1.3)
KETONES UR-MCNC: 15
LACTATE BLDV-MCNC: 1.5 MMOL/L — SIGNIFICANT CHANGE UP (ref 0.5–1.6)
LEUKOCYTE ESTERASE UR-ACNC: (no result)
LYMPHOCYTES # BLD AUTO: 1.55 K/UL — SIGNIFICANT CHANGE UP (ref 1.2–3.4)
LYMPHOCYTES # BLD AUTO: 44.8 % — SIGNIFICANT CHANGE UP (ref 20.5–51.1)
MCHC RBC-ENTMCNC: 31.1 PG — HIGH (ref 27–31)
MCHC RBC-ENTMCNC: 32.3 G/DL — SIGNIFICANT CHANGE UP (ref 32–37)
MCV RBC AUTO: 96.4 FL — SIGNIFICANT CHANGE UP (ref 81–99)
MONOCYTES # BLD AUTO: 0.28 K/UL — SIGNIFICANT CHANGE UP (ref 0.1–0.6)
MONOCYTES NFR BLD AUTO: 8.1 % — SIGNIFICANT CHANGE UP (ref 1.7–9.3)
NEUTROPHILS # BLD AUTO: 1.59 K/UL — SIGNIFICANT CHANGE UP (ref 1.4–6.5)
NEUTROPHILS NFR BLD AUTO: 45.9 % — SIGNIFICANT CHANGE UP (ref 42.2–75.2)
NITRITE UR-MCNC: NEGATIVE — SIGNIFICANT CHANGE UP
NRBC # BLD: 0 /100 WBCS — SIGNIFICANT CHANGE UP (ref 0–0)
PCO2 BLDV: 42 MMHG — SIGNIFICANT CHANGE UP (ref 41–51)
PH BLDV: 7.45 — HIGH (ref 7.26–7.43)
PH UR: 7 — SIGNIFICANT CHANGE UP (ref 5–8)
PLATELET # BLD AUTO: 161 K/UL — SIGNIFICANT CHANGE UP (ref 130–400)
PO2 BLDV: 20 MMHG — SIGNIFICANT CHANGE UP (ref 20–40)
POTASSIUM BLDV-SCNC: 4.6 MMOL/L — SIGNIFICANT CHANGE UP (ref 3.3–5.6)
POTASSIUM SERPL-MCNC: 5.3 MMOL/L — HIGH (ref 3.5–5)
POTASSIUM SERPL-SCNC: 5.3 MMOL/L — HIGH (ref 3.5–5)
PROT SERPL-MCNC: 6.4 G/DL — SIGNIFICANT CHANGE UP (ref 6–8)
PROT UR-MCNC: 30 MG/DL
PROTHROM AB SERPL-ACNC: 14.2 SEC — HIGH (ref 9.95–12.87)
RBC # BLD: 3.31 M/UL — LOW (ref 4.2–5.4)
RBC # FLD: 17.3 % — HIGH (ref 11.5–14.5)
SAO2 % BLDV: 30 % — SIGNIFICANT CHANGE UP
SODIUM SERPL-SCNC: 134 MMOL/L — LOW (ref 135–146)
SP GR SPEC: 1.01 — SIGNIFICANT CHANGE UP (ref 1.01–1.03)
TROPONIN T SERPL-MCNC: <0.01 NG/ML — SIGNIFICANT CHANGE UP
UROBILINOGEN FLD QL: 1 MG/DL (ref 0.2–0.2)
WBC # BLD: 3.46 K/UL — LOW (ref 4.8–10.8)
WBC # FLD AUTO: 3.46 K/UL — LOW (ref 4.8–10.8)
WBC UR QL: (no result) /HPF

## 2018-05-10 RX ORDER — ACETAMINOPHEN 500 MG
975 TABLET ORAL ONCE
Qty: 0 | Refills: 0 | Status: COMPLETED | OUTPATIENT
Start: 2018-05-10 | End: 2018-05-10

## 2018-05-10 RX ORDER — FOLIC ACID 0.8 MG
1 TABLET ORAL DAILY
Qty: 0 | Refills: 0 | Status: DISCONTINUED | OUTPATIENT
Start: 2018-05-10 | End: 2018-05-16

## 2018-05-10 RX ORDER — FUROSEMIDE 40 MG
40 TABLET ORAL ONCE
Qty: 0 | Refills: 0 | Status: DISCONTINUED | OUTPATIENT
Start: 2018-05-10 | End: 2018-05-11

## 2018-05-10 RX ORDER — CARBIDOPA AND LEVODOPA 25; 100 MG/1; MG/1
1 TABLET ORAL THREE TIMES A DAY
Qty: 0 | Refills: 0 | Status: DISCONTINUED | OUTPATIENT
Start: 2018-05-10 | End: 2018-05-16

## 2018-05-10 RX ORDER — CEFEPIME 1 G/1
1000 INJECTION, POWDER, FOR SOLUTION INTRAMUSCULAR; INTRAVENOUS EVERY 12 HOURS
Qty: 0 | Refills: 0 | Status: DISCONTINUED | OUTPATIENT
Start: 2018-05-10 | End: 2018-05-10

## 2018-05-10 RX ORDER — IPRATROPIUM/ALBUTEROL SULFATE 18-103MCG
3 AEROSOL WITH ADAPTER (GRAM) INHALATION EVERY 6 HOURS
Qty: 0 | Refills: 0 | Status: DISCONTINUED | OUTPATIENT
Start: 2018-05-10 | End: 2018-05-16

## 2018-05-10 RX ORDER — ACETAMINOPHEN 500 MG
650 TABLET ORAL EVERY 6 HOURS
Qty: 0 | Refills: 0 | Status: DISCONTINUED | OUTPATIENT
Start: 2018-05-10 | End: 2018-05-16

## 2018-05-10 RX ORDER — FENTANYL CITRATE 50 UG/ML
1 INJECTION INTRAVENOUS
Qty: 0 | Refills: 0 | Status: DISCONTINUED | OUTPATIENT
Start: 2018-05-10 | End: 2018-05-11

## 2018-05-10 RX ORDER — AZITHROMYCIN 500 MG/1
500 TABLET, FILM COATED ORAL ONCE
Qty: 0 | Refills: 0 | Status: COMPLETED | OUTPATIENT
Start: 2018-05-10 | End: 2018-05-10

## 2018-05-10 RX ORDER — MONTELUKAST 4 MG/1
10 TABLET, CHEWABLE ORAL AT BEDTIME
Qty: 0 | Refills: 0 | Status: DISCONTINUED | OUTPATIENT
Start: 2018-05-10 | End: 2018-05-16

## 2018-05-10 RX ORDER — LEVOTHYROXINE SODIUM 125 MCG
75 TABLET ORAL DAILY
Qty: 0 | Refills: 0 | Status: DISCONTINUED | OUTPATIENT
Start: 2018-05-10 | End: 2018-05-16

## 2018-05-10 RX ORDER — MORPHINE SULFATE 50 MG/1
30 CAPSULE, EXTENDED RELEASE ORAL
Qty: 0 | Refills: 0 | Status: DISCONTINUED | OUTPATIENT
Start: 2018-05-10 | End: 2018-05-11

## 2018-05-10 RX ORDER — FUROSEMIDE 40 MG
40 TABLET ORAL DAILY
Qty: 0 | Refills: 0 | Status: DISCONTINUED | OUTPATIENT
Start: 2018-05-10 | End: 2018-05-14

## 2018-05-10 RX ORDER — MORPHINE SULFATE 50 MG/1
15 CAPSULE, EXTENDED RELEASE ORAL
Qty: 0 | Refills: 0 | Status: DISCONTINUED | OUTPATIENT
Start: 2018-05-10 | End: 2018-05-11

## 2018-05-10 RX ORDER — OXYCODONE AND ACETAMINOPHEN 5; 325 MG/1; MG/1
1 TABLET ORAL EVERY 4 HOURS
Qty: 0 | Refills: 0 | Status: DISCONTINUED | OUTPATIENT
Start: 2018-05-10 | End: 2018-05-10

## 2018-05-10 RX ORDER — CEFEPIME 1 G/1
1000 INJECTION, POWDER, FOR SOLUTION INTRAMUSCULAR; INTRAVENOUS EVERY 12 HOURS
Qty: 0 | Refills: 0 | Status: DISCONTINUED | OUTPATIENT
Start: 2018-05-10 | End: 2018-05-12

## 2018-05-10 RX ORDER — SODIUM CHLORIDE 9 MG/ML
2000 INJECTION INTRAMUSCULAR; INTRAVENOUS; SUBCUTANEOUS ONCE
Qty: 0 | Refills: 0 | Status: COMPLETED | OUTPATIENT
Start: 2018-05-10 | End: 2018-05-10

## 2018-05-10 RX ORDER — METHOCARBAMOL 500 MG/1
750 TABLET, FILM COATED ORAL THREE TIMES A DAY
Qty: 0 | Refills: 0 | Status: DISCONTINUED | OUTPATIENT
Start: 2018-05-10 | End: 2018-05-11

## 2018-05-10 RX ORDER — ENOXAPARIN SODIUM 100 MG/ML
40 INJECTION SUBCUTANEOUS EVERY 24 HOURS
Qty: 0 | Refills: 0 | Status: DISCONTINUED | OUTPATIENT
Start: 2018-05-10 | End: 2018-05-16

## 2018-05-10 RX ADMIN — Medication 975 MILLIGRAM(S): at 19:49

## 2018-05-10 RX ADMIN — CEFEPIME 100 MILLIGRAM(S): 1 INJECTION, POWDER, FOR SOLUTION INTRAMUSCULAR; INTRAVENOUS at 22:50

## 2018-05-10 RX ADMIN — SODIUM CHLORIDE 2000 MILLILITER(S): 9 INJECTION INTRAMUSCULAR; INTRAVENOUS; SUBCUTANEOUS at 20:12

## 2018-05-10 RX ADMIN — AZITHROMYCIN 255 MILLIGRAM(S): 500 TABLET, FILM COATED ORAL at 20:32

## 2018-05-10 NOTE — ED PROVIDER NOTE - NS ED ROS FT
Constitutional: No fever, chills, unintended weight loss.  Eyes:  No visual changes, eye pain or discharge.  ENMT:  No hearing changes, pain, no sore throat or runny nose, no difficulty swallowing  Cardiac:  No chest pain, +SOB, no edema. No chest pain with exertion.  Respiratory: + cough no respiratory distress. No hemoptysis. No history of asthma or RAD.  GI:  No nausea, vomiting, diarrhea or abdominal pain.  :  No dysuria, frequency or burning.  MS:  No myalgia, muscle weakness, joint pain or back pain.  Neuro:  No headache or weakness.  No LOC.  Skin:  No skin rash.   Endocrine: +hypothyroidism,  no DM

## 2018-05-10 NOTE — H&P ADULT - NSHPLABSRESULTS_GEN_ALL_CORE
10.3   3.46  )-----------( 161      ( 10 May 2018 17:41 )             31.9       05-10    134<L>  |  92<L>  |  7<L>  ----------------------------<  69<L>  5.3<H>   |  25  |  0.8    Ca    7.1<L>      10 May 2018 17:43    TPro  6.4  /  Alb  3.2<L>  /  TBili  0.6  /  DBili  x   /  AST  69<H>  /  ALT  23  /  AlkPhos  86  05-10              Urinalysis Basic - ( 10 May 2018 18:35 )    Color: Yellow / Appearance: Clear / S.015 / pH: x  Gluc: x / Ketone: 15  / Bili: Negative / Urobili: 1.0 mg/dL   Blood: x / Protein: 30 mg/dL / Nitrite: Negative   Leuk Esterase: Trace / RBC: x / WBC 10-25 /HPF   Sq Epi: x / Non Sq Epi: Moderate /HPF / Bacteria: Moderate /HPF        PT/INR - ( 10 May 2018 17:43 )   PT: T.N.P. sec;   INR: T.N.P. ratio         PTT - ( 10 May 2018 17:43 )  PTT:T.N.P. sec    CARDIAC MARKERS ( 10 May 2018 17:43 )  x     / <0.01 ng/mL / 139 U/L / x     / 1.4 ng/mL      Culture Results:   No growth at 5 days. ( @ 05:57)  Culture Results:   >100,000 CFU/ml Escherichia coli ( @ 12:20)  Culture Results:   Growth in aerobic and anaerobic bottles: Escherichia coli  See previous culture [id number] ( @ 11:50)  Culture Results:   Growth in aerobic and anaerobic bottles: Escherichia coli  ***Blood Panel PCR results on this specimen are available  approximately 3 hours after the Gram stain result.***  Gram stain, PCR, and/or culture results may not always  correspond due to difference in methodologies.  ************************************************************  This PCR assay was performed using [employer].  The following targets are tested for: Enterococcus,  vancomycin resistant enterococci, Listeria monocytogenes,  coagulase negative staphylococci, S. aureus,  methicillin resistant S. aureus, Streptococcus agalactiae  (Group B), S. pneumoniae, S. pyogenes (Group A),  Acinetobacter baumannii, Enterobacter cloacae, E. coli,  Klebsiella oxytoca, K. pneumoniae, Proteus sp.,  Serratia marcescens, Haemophilus influenzae,  Neisseria meningitidis, Pseudomonas aeruginosa, Candida  albicans, C. glabrata, C krusei, C parapsilosis,  C. tropicalis and the KPC resistance gene.  "Due to technical problems, Proteus sp. will Not be reported as part of  the BCID panel until further notice" ( @ 11:40)  Culture Results:   10,000 - 49,000 CFU/mL Enterococcus faecalis ( @ 15:19)    < from: CT Chest w/ IV Cont (18 @ 21:50) >    Multiple small branching tubular hyperdensities within the right upper   lobe pulmonary vasculature compatible with cement emboli from recent   vertebroplasty. No distal air space opacity identified.    < end of copied text >    < from: Xray Chest 1 View- PORTABLE-Routine (18 @ 09:08) >    Left basilar focal atelectasis, unchanged..    < end of copied text > 10.3   3.46  )-----------( 161      ( 10 May 2018 17:41 )             31.9     05-10    134<L>  |  92<L>  |  7<L>  ----------------------------<  69<L>  5.3<H>   |  25  |  0.8    Ca    7.1<L>      10 May 2018 17:43    TPro  6.4  /  Alb  3.2<L>  /  TBili  0.6  /  DBili  x   /  AST  69<H>  /  ALT  23  /  AlkPhos  86  05-10          Urinalysis Basic - ( 10 May 2018 18:35 )    Color: Yellow / Appearance: Clear / S.015 / pH: x  Gluc: x / Ketone: 15  / Bili: Negative / Urobili: 1.0 mg/dL   Blood: x / Protein: 30 mg/dL / Nitrite: Negative   Leuk Esterase: Trace / RBC: x / WBC 10-25 /HPF   Sq Epi: x / Non Sq Epi: Moderate /HPF / Bacteria: Moderate /HPF        PT/INR - ( 10 May 2018 17:43 )   PT: T.N.P. sec;   INR: T.N.P. ratio         PTT - ( 10 May 2018 17:43 )  PTT:T.N.P. sec    CARDIAC MARKERS ( 10 May 2018 17:43 )  x     / <0.01 ng/mL / 139 U/L / x     / 1.4 ng/mL      Culture Results:   No growth at 5 days. ( @ 05:57)  Culture Results:   >100,000 CFU/ml Escherichia coli ( @ 12:20)  Culture Results:   Growth in aerobic and anaerobic bottles: Escherichia coli  See previous culture [id number] ( @ 11:50)  Culture Results:   Growth in aerobic and anaerobic bottles: Escherichia coli  ***Blood Panel PCR results on this specimen are available  approximately 3 hours after the Gram stain result.***  Gram stain, PCR, and/or culture results may not always  correspond due to difference in methodologies.  ************************************************************  This PCR assay was performed using [employer].  The following targets are tested for: Enterococcus,  vancomycin resistant enterococci, Listeria monocytogenes,  coagulase negative staphylococci, S. aureus,  methicillin resistant S. aureus, Streptococcus agalactiae  (Group B), S. pneumoniae, S. pyogenes (Group A),  Acinetobacter baumannii, Enterobacter cloacae, E. coli,  Klebsiella oxytoca, K. pneumoniae, Proteus sp.,  Serratia marcescens, Haemophilus influenzae,  Neisseria meningitidis, Pseudomonas aeruginosa, Candida  albicans, C. glabrata, C krusei, C parapsilosis,  C. tropicalis and the KPC resistance gene.  "Due to technical problems, Proteus sp. will Not be reported as part of  the BCID panel until further notice" ( @ 11:40)  Culture Results:   10,000 - 49,000 CFU/mL Enterococcus faecalis ( @ 15:19)    < from: CT Chest w/ IV Cont (18 @ 21:50) >    Multiple small branching tubular hyperdensities within the right upper   lobe pulmonary vasculature compatible with cement emboli from recent   vertebroplasty. No distal air space opacity identified.    < end of copied text >    < from: Xray Chest 1 View- PORTABLE-Routine (18 @ 09:08) >    Left basilar focal atelectasis, unchanged..    < end of copied text >

## 2018-05-10 NOTE — H&P ADULT - ATTENDING COMMENTS
Pt seen and examined independent of resident, agree with above history, physical exam, assessment and plan except mentioned below    Addendum  She was discharged from [facility] 2 days ago after being treated for UTI  Last month she had Kyphoplasty at [place] by Dr Garcia  She has cough and retained secretions and had fever  feels weak     A/p    1-Fever, cough  on exam has retained secretions  Likely Aspiration  h/o Parkinsons disaese  will keep her NPO today  Speech and swallow eval  Aspiration precautions  Chest PT  continue nebs/steroids    2- UTI urine culture was positive for E coli  Follow repeat cultures   continue Abx    3- Parkinsons disease    continue sinemet    4- H/O recent kyphoplasty  no back pain  d/c morphine, raboxin  NS eval    5-  h/o Psoriatic arthritis  Hold Methotraxate  continue steroids    6- Hypothyroidism  on Levothyroxine    7- DVT/GI prophylaxis  s/c heparin/PPIs    Discussed with daughter at bedside in detail  All questions and concerns answered

## 2018-05-10 NOTE — H&P ADULT - ASSESSMENT
# Fever    #SOB    # psoriatic arthritis    # pulmonary cement embolization s/p kyphoplasty    #Hypocalcemia    # anemia    # hypothyroidism  # DWAYNE  c/w cpap  # parkinsonism  # fibromyalgia 77 yof with multiple comorbidities presents with cc of sob since 1 day ptp found to have fever of 102    # SOB r/o chf exacerbation- b/l LE edema; crackles on exam; pt was recently treated for ecoli bacteremia & ecoli uti  - check echo  - check bnp; pan culture  - empiric cefepime for now  - iv lasix   - nebs;  -  pulm dr lopez cs    # psoriatic arthritis/ rheumatoid arthritis  - c/w methotrexate; home meds  - f/u rheum OP    # pulmonary cement embolization s/p kyphoplasty  - As per pulm- pt doesnt need AC    #Hypocalcemia/ osteoporosis  - supplements  - pt was seen by endo last admission; hypocalcemia attributed to meds    # anemia  -  monitor cbc    # hypothyroidism  - c/w synthroid    # DWAYNE  c/w cpap    # parkinsonism/ fibromyalgia  - c/w home meds; c/w morphine & fentanyl patch    # dvt ppx    # dispo  pt/rehab cs  might need vns vs placement

## 2018-05-10 NOTE — ED PROVIDER NOTE - MEDICAL DECISION MAKING DETAILS
cough, sob, fever - recently discharged for asp pna & e. coli uti per family - sepsis w/u, resusc, reassess

## 2018-05-10 NOTE — H&P ADULT - NSHPPHYSICALEXAM_GEN_ALL_CORE
GENERAL: NAD, speaks in full sentences, no signs of respiratory distress  HEAD:  Atraumatic, Normocephalic  EYES: EOMI, PERRLA, conjunctiva and sclera clear  NECK: Supple, No JVD  CHEST/LUNG: Clear to auscultation bilaterally; No wheeze; No crackles; No accessory muscles used  HEART: Regular rate and rhythm; No murmurs;   ABDOMEN: Soft, Nontender, Nondistended; Bowel sounds present; No guarding  EXTREMITIES:  2+ Peripheral Pulses, No cyanosis or edema  PSYCH: AAOx3  NEUROLOGY: non-focal  SKIN: No rashes or lesions GENERAL: NAD,  HEAD:  Atraumatic, Normocephalic  EYES: EOMI, PERRLA, conjunctiva and sclera clear  NECK: Supple, No JVD  CHEST/LUNG: b/l crackles; gurgling  HEART: Regular rate and rhythm; No murmurs;   ABDOMEN: Soft, Nontender, Nondistended; Bowel sounds present; No guarding  EXTREMITIES:  b/l LE edema  PSYCH: AAOx3  NEUROLOGY: non-focal  SKIN: No rashes or lesions

## 2018-05-10 NOTE — ED PROVIDER NOTE - OBJECTIVE STATEMENT
77y f h/o pia on cpap, hypothyroidism, fibromyalgia, parkinson's, psoriatic arthritis on prednisone x many yrs complic op/kyphosis p/w sob x 1d. Pt w/recent kyphoplasty by Dr. 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 Since being home pt has still been feeling unwell, with coughing & sob. Found to be febrile by VNS who came to hosp today. Pt denies neck pain, cp, nvd, abd pain, flank pain urinary sx, rash, edema. PMD Dr. Moncada - Pulm Dr. Rock - Rheum Dr. Fulton.

## 2018-05-10 NOTE — H&P ADULT - PMH
Fibromyalgia    GERD (gastroesophageal reflux disease)    Hypothyroidism  no recent dosage changes  Obesity  s/p gastric bypass ~15 yrs ago ~100 lb loss  DWAYNE on CPAP    Osteoarthritis    Psoriatic arthritis    Pulmonary embolism    Rheumatoid arthritis    Seasonal allergies    Tremors of nervous system  essential tremors

## 2018-05-10 NOTE — ED PROVIDER NOTE - PHYSICAL EXAMINATION
VITAL SIGNS: I have reviewed nursing notes and confirm.  CONSTITUTIONAL: elderly f, parkinson-like features, aaox3 w/questioning, nad  SKIN: Skin exam is warm and dry, no acute rash.  HEAD: Normocephalic; atraumatic.  EYES: PERRL, EOM intact; conjunctiva and sclera clear.  ENT: No nasal discharge; airway clear.  NECK: Supple; non tender.  CARD: S1, S2 normal; no murmurs, gallops, or rubs. Regular rate and rhythm.  RESP: coarse BS bl, no wheezes or rales  ABD: Normal bowel sounds; soft; non-distended; non-tender; no hepatosplenomegaly; no rgr  BACK: no cvat  EXT: Normal ROM. No clubbing, cyanosis or edema. dpi.  NEURO: Alert, oriented. Grossly unremarkable. No focal deficits. +incr tone x 4.  PSYCH: Cooperative, appropriate.

## 2018-05-10 NOTE — H&P ADULT - HISTORY OF PRESENT ILLNESS
77 yof with multiple comorbidities presents with cc of sob since 1 day ptp found to have fever of 102  - sob started graduallly 1 day ptp; progressively worsening, worsens with exertion; not assoc with cough  - pt has multiple recent admissions for - ecoli uti/ ecoli bacteremia/ cement pulmonary emboli s/p kyphoplasty  - pt lives at home with ; dependent on adls; uses walker for ambulation    - In ed- cefepime ivf 1 L; birdie 77 yof with multiple comorbidities presents with cc of sob since 1 day ptp found to have fever of 102  - sob started graduallly 1 day ptp; progressively worsening, worsens with exertion; not assoc with cough  - pt has multiple recent admissions for - ecoli uti/ ecoli bacteremia/ cement pulmonary emboli s/p kyphoplasty  - pt lives at home with ; dependent on adls; uses walker for ambulation   - weakness, difficulty clearing secretions  - no nausea, vomiting, no back pain    - In ed- cefepime ivf 1 L; birdie

## 2018-05-11 LAB
ALBUMIN SERPL ELPH-MCNC: 3.5 G/DL — SIGNIFICANT CHANGE UP (ref 3.5–5.2)
ALP SERPL-CCNC: 94 U/L — SIGNIFICANT CHANGE UP (ref 30–115)
ALT FLD-CCNC: 10 U/L — SIGNIFICANT CHANGE UP (ref 0–41)
ANION GAP SERPL CALC-SCNC: 23 MMOL/L — HIGH (ref 7–14)
AST SERPL-CCNC: 68 U/L — HIGH (ref 0–41)
BILIRUB DIRECT SERPL-MCNC: <0.2 MG/DL — SIGNIFICANT CHANGE UP (ref 0–0.2)
BILIRUB INDIRECT FLD-MCNC: >0.5 MG/DL — SIGNIFICANT CHANGE UP (ref 0.2–1.2)
BILIRUB SERPL-MCNC: 0.7 MG/DL — SIGNIFICANT CHANGE UP (ref 0.2–1.2)
BUN SERPL-MCNC: 9 MG/DL — LOW (ref 10–20)
CALCIUM SERPL-MCNC: 7 MG/DL — LOW (ref 8.5–10.1)
CHLORIDE SERPL-SCNC: 88 MMOL/L — LOW (ref 98–110)
CO2 SERPL-SCNC: 25 MMOL/L — SIGNIFICANT CHANGE UP (ref 17–32)
CREAT SERPL-MCNC: 0.8 MG/DL — SIGNIFICANT CHANGE UP (ref 0.7–1.5)
GLUCOSE SERPL-MCNC: 75 MG/DL — SIGNIFICANT CHANGE UP (ref 70–99)
HCT VFR BLD CALC: 35.2 % — LOW (ref 37–47)
HGB BLD-MCNC: 11.3 G/DL — LOW (ref 12–16)
MAGNESIUM SERPL-MCNC: 1.6 MG/DL — LOW (ref 1.8–2.4)
MCHC RBC-ENTMCNC: 30.8 PG — SIGNIFICANT CHANGE UP (ref 27–31)
MCHC RBC-ENTMCNC: 32.1 G/DL — SIGNIFICANT CHANGE UP (ref 32–37)
MCV RBC AUTO: 95.9 FL — SIGNIFICANT CHANGE UP (ref 81–99)
NRBC # BLD: 0 /100 WBCS — SIGNIFICANT CHANGE UP (ref 0–0)
NT-PROBNP SERPL-SCNC: 1185 PG/ML — HIGH (ref 0–300)
PLATELET # BLD AUTO: 203 K/UL — SIGNIFICANT CHANGE UP (ref 130–400)
POTASSIUM SERPL-MCNC: 4 MMOL/L — SIGNIFICANT CHANGE UP (ref 3.5–5)
POTASSIUM SERPL-SCNC: 4 MMOL/L — SIGNIFICANT CHANGE UP (ref 3.5–5)
PROT SERPL-MCNC: 6.8 G/DL — SIGNIFICANT CHANGE UP (ref 6–8)
RBC # BLD: 3.67 M/UL — LOW (ref 4.2–5.4)
RBC # FLD: 17.2 % — HIGH (ref 11.5–14.5)
SODIUM SERPL-SCNC: 136 MMOL/L — SIGNIFICANT CHANGE UP (ref 135–146)
WBC # BLD: 2.8 K/UL — LOW (ref 4.8–10.8)
WBC # FLD AUTO: 2.8 K/UL — LOW (ref 4.8–10.8)

## 2018-05-11 RX ORDER — BUDESONIDE AND FORMOTEROL FUMARATE DIHYDRATE 160; 4.5 UG/1; UG/1
2 AEROSOL RESPIRATORY (INHALATION)
Qty: 0 | Refills: 0 | Status: DISCONTINUED | OUTPATIENT
Start: 2018-05-11 | End: 2018-05-16

## 2018-05-11 RX ADMIN — CARBIDOPA AND LEVODOPA 1 TABLET(S): 25; 100 TABLET ORAL at 14:56

## 2018-05-11 RX ADMIN — Medication 3 MILLILITER(S): at 03:28

## 2018-05-11 RX ADMIN — MORPHINE SULFATE 30 MILLIGRAM(S): 50 CAPSULE, EXTENDED RELEASE ORAL at 07:55

## 2018-05-11 RX ADMIN — CARBIDOPA AND LEVODOPA 1 TABLET(S): 25; 100 TABLET ORAL at 06:25

## 2018-05-11 RX ADMIN — Medication 60 MILLIGRAM(S): at 19:35

## 2018-05-11 RX ADMIN — Medication 5 MILLIGRAM(S): at 06:26

## 2018-05-11 RX ADMIN — Medication 40 MILLIGRAM(S): at 06:21

## 2018-05-11 RX ADMIN — METHOCARBAMOL 750 MILLIGRAM(S): 500 TABLET, FILM COATED ORAL at 06:25

## 2018-05-11 RX ADMIN — Medication 75 MICROGRAM(S): at 06:25

## 2018-05-11 RX ADMIN — FENTANYL CITRATE 1 PATCH: 50 INJECTION INTRAVENOUS at 15:00

## 2018-05-11 RX ADMIN — ENOXAPARIN SODIUM 40 MILLIGRAM(S): 100 INJECTION SUBCUTANEOUS at 06:25

## 2018-05-11 RX ADMIN — Medication 1 TABLET(S): at 13:15

## 2018-05-11 RX ADMIN — BUDESONIDE AND FORMOTEROL FUMARATE DIHYDRATE 2 PUFF(S): 160; 4.5 AEROSOL RESPIRATORY (INHALATION) at 19:36

## 2018-05-11 RX ADMIN — MORPHINE SULFATE 30 MILLIGRAM(S): 50 CAPSULE, EXTENDED RELEASE ORAL at 06:28

## 2018-05-11 RX ADMIN — CEFEPIME 100 MILLIGRAM(S): 1 INJECTION, POWDER, FOR SOLUTION INTRAMUSCULAR; INTRAVENOUS at 06:26

## 2018-05-11 RX ADMIN — Medication 650 MILLIGRAM(S): at 19:45

## 2018-05-11 RX ADMIN — Medication 3 MILLILITER(S): at 20:17

## 2018-05-11 RX ADMIN — Medication 3 MILLILITER(S): at 13:08

## 2018-05-11 RX ADMIN — CEFEPIME 100 MILLIGRAM(S): 1 INJECTION, POWDER, FOR SOLUTION INTRAMUSCULAR; INTRAVENOUS at 19:34

## 2018-05-11 RX ADMIN — Medication 3 MILLILITER(S): at 07:37

## 2018-05-11 RX ADMIN — Medication 1 MILLIGRAM(S): at 14:56

## 2018-05-11 NOTE — CONSULT NOTE ADULT - SUBJECTIVE AND OBJECTIVE BOX
YONATAN MEADE  MRN-[id number]    HISTORY OF PRESENT ILLNESS:    76 yo with multiple comorbidities presents with cc of sob since 1 day ptp found to have fever of 102 at home  - sob started graduallly 1 day ptp; progressively worsening, worsens with exertion; not assoc with cough  - pt has multiple recent admissions for - ecoli uti/ ecoli bacteremia/ cement pulmonary emboli s/p kyphoplasty  - pt lives at home with ; dependent on adls; uses walker for ambulation    - pt currently c/o cough, shortness of breath wheezing  - pts  at bedside, states she ahs had episodes of aspiration in the past  -pt on chrionic steroids 5 mg for underlying ra      PMH/PSH:  PAST MEDICAL & SURGICAL HISTORY:  Asthma  Pulmonary embolism  Seasonal allergies  DWAYNE on CPAP  Obesity: s/p gastric bypass ~15 yrs ago ~100 lb loss  Osteoarthritis  Rheumatoid arthritis  Fibromyalgia  Tremors of nervous system: essential tremors  Psoriatic arthritis  GERD (gastroesophageal reflux disease)  Hypothyroidism: no recent dosage changes  Ankle deformity: s/p b/l surgical repair  H/O gastric bypass    ALLERGIES:  Allergies    adhesives (Pruritus; Rash)  Betadine (Flushing (Skin); Pruritus)  NSAIDs (Vomiting; Rash; Nausea)    Intolerances      SOCIAL HABITS:  neg x 3    FAMILY HISTORY:   FAMILY HISTORY:  No pertinent family history in first degree relatives      REVIEW OF SYSTEM:  Elements of review of systems are negative or non-applicable except as noted above in HPI section.       HOME MEDICATIONS:  Actemra 20 mg/mL intravenous solution  bacitracin 500 units/g topical ointment  carbidopa-levodopa 10 mg-100 mg oral tablet  fentaNYL 12 mcg/hr transdermal film, extended release  levothyroxine 75 mcg (0.075 mg) oral tablet  methotrexate 10 mg oral tablet  montelukast 10 mg oral tablet  morphine 15 mg/12 hr oral tablet, extended release  morphine 30 mg/12 hr oral tablet, extended release  predniSONE 5 mg oral delayed release tablet  Savella 25 mg oral tablet    MEDICATIONS:  MEDICATIONS  (STANDING):  ALBUTerol/ipratropium for Nebulization 3 milliLiter(s) Nebulizer every 6 hours  buDESOnide  80 MICROgram(s)/formoterol 4.5 MICROgram(s) Inhaler 2 Puff(s) Inhalation two times a day  calcium carbonate 1250 mG + Vitamin D (OsCal 500 + D) 1 Tablet(s) Oral daily  carbidopa/levodopa  10/100 1 Tablet(s) Oral three times a day  cefepime   IVPB 1000 milliGRAM(s) IV Intermittent every 12 hours  enoxaparin Injectable 40 milliGRAM(s) SubCutaneous every 24 hours  fentaNYL   Patch  12 MICROgram(s)/Hr 1 Patch Transdermal every 72 hours  folic acid 1 milliGRAM(s) Oral daily  furosemide   Injectable 40 milliGRAM(s) IV Push daily  furosemide   Injectable 40 milliGRAM(s) IV Push once  levothyroxine 75 MICROGram(s) Oral daily  methocarbamol 750 milliGRAM(s) Oral three times a day  methylPREDNISolone sodium succinate Injectable 60 milliGRAM(s) IV Push two times a day  montelukast 10 milliGRAM(s) Oral at bedtime  morphine ER Tablet 30 milliGRAM(s) Oral two times a day    MEDICATIONS  (PRN):  acetaminophen   Tablet 650 milliGRAM(s) Oral every 6 hours PRN For Temp greater than 38 C (100.4 F)  morphine ER Tablet 15 milliGRAM(s) Oral two times a day PRN severe pain        VITALS:   Vital Signs Last 24 Hrs  T(C): 37.2 (11 May 2018 06:30), Max: 39.1 (10 May 2018 19:38)  T(F): 99 (11 May 2018 06:30), Max: 102.4 (10 May 2018 19:38)  HR: 113 (11 May 2018 08:00) (82 - 113)  BP: 134/97 (11 May 2018 08:00) (123/61 - 169/80)  BP(mean): --  RR: 20 (11 May 2018 06:30) (20 - 22)  SpO2: 98% (11 May 2018 06:30) (98% - 100%)        PHYSICAL EXAM:    GENERAL: NAD  HEAD:  Atraumatic, Normocephalic  NECK: Supple, No JVD  CHEST/LUNG: ronchi  HEART: Regular rate and rhythm  ABDOMEN: Soft, Nontender, Nondistended; Bowel sounds present  EXTREMITIES:  edema      LABS:                        11.3   2.80  )-----------( 203      ( 11 May 2018 08:32 )             35.2     05-10    134<L>  |  92<L>  |  7<L>  ----------------------------<  69<L>  5.3<H>   |  25  |  0.8    Ca    7.1<L>      10 May 2018 17:43    TPro  6.4  /  Alb  3.2<L>  /  TBili  0.6  /  DBili  x   /  AST  69<H>  /  ALT  23  /  AlkPhos  86  05-10    LIVER FUNCTIONS - ( 10 May 2018 17:43 )  Alb: 3.2 g/dL / Pro: 6.4 g/dL / ALK PHOS: 86 U/L / ALT: 23 U/L / AST: 69 U/L / GGT: x           CARDIAC MARKERS ( 10 May 2018 17:43 )  x     / <0.01 ng/mL / 139 U/L / x     / 1.4 ng/mL      PT/INR - ( 10 May 2018 17:43 )   PT: T.N.P. sec;   INR: T.N.P. ratio         PTT - ( 10 May 2018 17:43 )  PTT:T.N.P. sec    Urinalysis Basic - ( 10 May 2018 18:35 )    Color: Yellow / Appearance: Clear / S.015 / pH: x  Gluc: x / Ketone: 15  / Bili: Negative / Urobili: 1.0 mg/dL   Blood: x / Protein: 30 mg/dL / Nitrite: Negative   Leuk Esterase: Trace / RBC: x / WBC 10-25 /HPF   Sq Epi: x / Non Sq Epi: Moderate /HPF / Bacteria: Moderate /HPF          ABG & VENT SETTINGS (when applicable)        DIAGNOSTIC STUDIES:    < from: Xray Chest 1 View-PORTABLE IMMEDIATE (05.10.18 @ 16:41) >  Impression:      No radiographic evidence of acute cardiopulmonary disease.    < end of copied text >

## 2018-05-11 NOTE — CONSULT NOTE ADULT - SUBJECTIVE AND OBJECTIVE BOX
Patient is a 77y old  Female who presents with a chief complaint of sob & fever (10 May 2018 21:42)    HPI:  77 yof with multiple comorbidities presents with cc of sob since 1 day ptp found to have fever of 102  - sob started graduallly 1 day ptp; progressively worsening, worsens with exertion; not assoc with cough  - pt has multiple recent admissions for - ecoli uti/ ecoli bacteremia/ cement pulmonary emboli s/p kyphoplasty  - pt lives at home with ; dependent on adls; uses walker for ambulation    - In ed- cefepime ivf 1 L; azithro (10 May 2018 21:42)      PAST MEDICAL & SURGICAL HISTORY:  Pulmonary embolism  Seasonal allergies  DWAYNE on CPAP  Obesity: s/p gastric bypass ~15 yrs ago ~100 lb loss  Osteoarthritis  Rheumatoid arthritis  Fibromyalgia  Tremors of nervous system: essential tremors  Psoriatic arthritis  GERD (gastroesophageal reflux disease)  Hypothyroidism: no recent dosage changes  Ankle deformity: s/p b/l surgical repair  H/O gastric bypass      Hospital Course:  Asthma exacerbation  - IV Solumedrol  - Duonebs  - Symbicort   CHF  - Lasix IV  - Obtain echo   UTI ( H/o E. coli uti & bacteremia )  - On Cefepime  TODAY'S SUBJECTIVE & REVIEW OF SYMPTOMS:     Constitutional Weakness   Cardio WNL   Resp WNL   GI WNL  Heme WNL  Endo WNL  Skin WNL  MSK WNL  Neuro WNL  Cognitive WNL  Psych WNL      MEDICATIONS  (STANDING):  ALBUTerol/ipratropium for Nebulization 3 milliLiter(s) Nebulizer every 6 hours  buDESOnide  80 MICROgram(s)/formoterol 4.5 MICROgram(s) Inhaler 2 Puff(s) Inhalation two times a day  calcium carbonate 1250 mG + Vitamin D (OsCal 500 + D) 1 Tablet(s) Oral daily  carbidopa/levodopa  10/100 1 Tablet(s) Oral three times a day  cefepime   IVPB 1000 milliGRAM(s) IV Intermittent every 12 hours  enoxaparin Injectable 40 milliGRAM(s) SubCutaneous every 24 hours  fentaNYL   Patch  12 MICROgram(s)/Hr 1 Patch Transdermal every 72 hours  folic acid 1 milliGRAM(s) Oral daily  furosemide   Injectable 40 milliGRAM(s) IV Push daily  furosemide   Injectable 40 milliGRAM(s) IV Push once  levothyroxine 75 MICROGram(s) Oral daily  methocarbamol 750 milliGRAM(s) Oral three times a day  methylPREDNISolone sodium succinate Injectable 60 milliGRAM(s) IV Push two times a day  montelukast 10 milliGRAM(s) Oral at bedtime  morphine ER Tablet 30 milliGRAM(s) Oral two times a day    MEDICATIONS  (PRN):  acetaminophen   Tablet 650 milliGRAM(s) Oral every 6 hours PRN For Temp greater than 38 C (100.4 F)  morphine ER Tablet 15 milliGRAM(s) Oral two times a day PRN severe pain      FAMILY HISTORY:  No pertinent family history in first degree relatives      Allergies    adhesives (Pruritus; Rash)  Betadine (Flushing (Skin); Pruritus)  NSAIDs (Vomiting; Rash; Nausea)    Intolerances        SOCIAL HISTORY:    [    ] Etoh  [    ] Smoking  [    ] Substance abuse     Home Environment:  [    ] Home Alone  [ xx   ] Lives with Family  [    ] Home Health Aid    Dwelling:  [    ] Apartment  [    ] Private House  [    ] Adult Home  [    ] Skilled Nursing Facility      [    ] Short Term  [    ] Long Term  [ x   ] Stairs     Chairlift                      [    ] Elevator     FUNCTIONAL STATUS PTA: (Check all that apply)  Ambulation: [     ]Independent    [  x  ] Dependent     [    ] Non-Ambulatory  Assistive Device: [    ] SA Cane  [    ]  Q Cane  [   x ] Walker  [    ]  Wheelchair  ADL : [    ] Independent  [   x ]  Dependent       Vital Signs Last 24 Hrs  T(C): 37.2 (11 May 2018 06:30), Max: 39.1 (10 May 2018 19:38)  T(F): 99 (11 May 2018 06:30), Max: 102.4 (10 May 2018 19:38)  HR: 113 (11 May 2018 08:00) (82 - 113)  BP: 134/97 (11 May 2018 08:00) (123/61 - 169/65)  BP(mean): --  RR: 20 (11 May 2018 06:30) (20 - 22)  SpO2: 98% (11 May 2018 06:30) (98% - 99%)      PHYSICAL EXAM: Alert & Oriented X3  GENERAL: NAD, well-groomed, well-developed  HEAD:  Atraumatic, Normocephalic  EYES: EOMI, PERRLA, conjunctiva and sclera clear  NECK: Supple, No JVD, Normal thyroid  CHEST/LUNG: Clear to percussion bilaterally; No rales, rhonchi, wheezing, or rubs  HEART: Regular rate and rhythm; No murmurs, rubs, or gallops  ABDOMEN: Soft, Nontender, Nondistended; Bowel sounds present  EXTREMITIES:  +1 edema   	    NERVOUS SYSTEM:  Cranial Nerves 2-12 intact [    ] Abnormal  [    ]  ROM: WFL all extremities [    ]  Abnormal [     ]  Motor Strength: WFL all extremities  [    ]  Abnormal [    ]  Sensation: intact to light touch [    ] Abnormal [    ]  Reflexes: Symmetric [    ]  Abnormal [    ]    FUNCTIONAL STATUS:  Bed Mobility: [   ]  Independent [    ]  Supervision [    ]  Needs Assistance [  ]  N/A  Transfers: [    ]  Independent [    ]  Supervision [    ]  Needs Assistance [    ]  N/A    Ambulation:  [    ]  Independent [    ]  Supervision [    ]  Needs Assistance [    ]  N/A   ADL:  [    ]   Independent [    ] Requires Assistance [    ] N/A       LABS:                        11.3   2.80  )-----------( 203      ( 11 May 2018 08:32 )             35.2     05-11    136  |  88<L>  |  9<L>  ----------------------------<  75  4.0   |  25  |  0.8    Ca    7.0<L>      11 May 2018 08:32  Mg     1.6         TPro  6.8  /  Alb  3.5  /  TBili  0.7  /  DBili  <0.2  /  AST  68<H>  /  ALT  10  /  AlkPhos  94      PT/INR - ( 10 May 2018 17:43 )   PT: T.N.P. sec;   INR: T.N.P. ratio         PTT - ( 10 May 2018 17:43 )  PTT:T.N.P. sec  Urinalysis Basic - ( 10 May 2018 18:35 )    Color: Yellow / Appearance: Clear / S.015 / pH: x  Gluc: x / Ketone: 15  / Bili: Negative / Urobili: 1.0 mg/dL   Blood: x / Protein: 30 mg/dL / Nitrite: Negative   Leuk Esterase: Trace / RBC: x / WBC 10-25 /HPF   Sq Epi: x / Non Sq Epi: Moderate /HPF / Bacteria: Moderate /HPF        RADIOLOGY & ADDITIONAL STUDIES: Patient is a 77y old  Female who presents with a chief complaint of sob & fever (10 May 2018 21:42)    HPI:  77 yof with multiple comorbidities presents with cc of sob since 1 day ptp found to have fever of 102  - sob started graduallly 1 day ptp; progressively worsening, worsens with exertion; not assoc with cough  - pt has multiple recent admissions for - ecoli uti/ ecoli bacteremia/ cement pulmonary emboli s/p kyphoplasty  - pt lives at home with ; dependent on adls; uses walker for ambulation    - In ed- cefepime ivf 1 L; azithro (10 May 2018 21:42)      PAST MEDICAL & SURGICAL HISTORY:  Pulmonary embolism  Seasonal allergies  DWAYNE on CPAP  Obesity: s/p gastric bypass ~15 yrs ago ~100 lb loss  Osteoarthritis  Rheumatoid arthritis  Fibromyalgia  Tremors of nervous system: essential tremors  Psoriatic arthritis  GERD (gastroesophageal reflux disease)  Hypothyroidism: no recent dosage changes  Ankle deformity: s/p b/l surgical repair  H/O gastric bypass      Hospital Course:  Asthma exacerbation  - IV Solumedrol  - Duonebs  - Symbicort   CHF  - Lasix IV  - Obtain echo   UTI ( H/o E. coli uti & bacteremia )  - On Cefepime  TODAY'S SUBJECTIVE & REVIEW OF SYMPTOMS:     Constitutional Weakness   Cardio WNL   Resp WNL   GI WNL  Heme WNL  Endo WNL  Skin WNL  MSK WNL  Neuro WNL  Cognitive WNL  Psych WNL  Occ urine incontinence    MEDICATIONS  (STANDING):  ALBUTerol/ipratropium for Nebulization 3 milliLiter(s) Nebulizer every 6 hours  buDESOnide  80 MICROgram(s)/formoterol 4.5 MICROgram(s) Inhaler 2 Puff(s) Inhalation two times a day  calcium carbonate 1250 mG + Vitamin D (OsCal 500 + D) 1 Tablet(s) Oral daily  carbidopa/levodopa  10/100 1 Tablet(s) Oral three times a day  cefepime   IVPB 1000 milliGRAM(s) IV Intermittent every 12 hours  enoxaparin Injectable 40 milliGRAM(s) SubCutaneous every 24 hours  fentaNYL   Patch  12 MICROgram(s)/Hr 1 Patch Transdermal every 72 hours  folic acid 1 milliGRAM(s) Oral daily  furosemide   Injectable 40 milliGRAM(s) IV Push daily  furosemide   Injectable 40 milliGRAM(s) IV Push once  levothyroxine 75 MICROGram(s) Oral daily  methocarbamol 750 milliGRAM(s) Oral three times a day  methylPREDNISolone sodium succinate Injectable 60 milliGRAM(s) IV Push two times a day  montelukast 10 milliGRAM(s) Oral at bedtime  morphine ER Tablet 30 milliGRAM(s) Oral two times a day    MEDICATIONS  (PRN):  acetaminophen   Tablet 650 milliGRAM(s) Oral every 6 hours PRN For Temp greater than 38 C (100.4 F)  morphine ER Tablet 15 milliGRAM(s) Oral two times a day PRN severe pain      FAMILY HISTORY:  No pertinent family history in first degree relatives      Allergies    adhesives (Pruritus; Rash)  Betadine (Flushing (Skin); Pruritus)  NSAIDs (Vomiting; Rash; Nausea)    Intolerances        SOCIAL HISTORY:    [    ] Etoh  [    ] Smoking  [    ] Substance abuse     Home Environment:  [    ] Home Alone  [ xx   ] Lives with Family  [    ] Home Health Aid    Dwelling:  [    ] Apartment  [ x   ] Private House  [    ] Adult Home  [    ] Skilled Nursing Facility      [    ] Short Term  [    ] Long Term  [ x   ] Stairs     Chairlift                      [    ] Elevator     FUNCTIONAL STATUS PTA: (Check all that apply)  Ambulation: [     ]Independent    [  x  ] Dependent     [    ] Non-Ambulatory  Assistive Device: [    ] SA Cane  [    ]  Q Cane  [   x ] Walker  [    ]  Wheelchair  ADL : [    ] Independent  [   x ]  Dependent       Vital Signs Last 24 Hrs  T(C): 37.2 (11 May 2018 06:30), Max: 39.1 (10 May 2018 19:38)  T(F): 99 (11 May 2018 06:30), Max: 102.4 (10 May 2018 19:38)  HR: 113 (11 May 2018 08:00) (82 - 113)  BP: 134/97 (11 May 2018 08:00) (123/61 - 169/65)  BP(mean): --  RR: 20 (11 May 2018 06:30) (20 - 22)  SpO2: 98% (11 May 2018 06:30) (98% - 99%)      PHYSICAL EXAM: Alert & Oriented X3  GENERAL: NAD, well-groomed, well-developed  HEAD:  Atraumatic, Normocephalic  EYES: EOMI, PERRLA, conjunctiva and sclera clear  NECK: Supple, No JVD, Normal thyroid  CHEST/LUNG: +exp wheeze  HEART: Regular rate and rhythm; No murmurs, rubs, or gallops  ABDOMEN: Soft, Nontender, Nondistended; Bowel sounds present  EXTREMITIES:  +1 edema   	    NERVOUS SYSTEM:  Cranial Nerves 2-12 intact [  xx  ] Abnormal  [    ]  ROM: WFL all extremities [   x ]  Abnormal [     ]  Motor Strength: WFL all extremities  [ xx   ]  Abnormal [    ]  Sensation: intact to light touch [ x   ] Abnormal [      +TREMORS BUES    FUNCTIONAL STATUS:  Bed Mobility: [   ]  Independent [    ]  Supervision [ x   ]  Needs Assistance [  ]  N/A  Transfers: [    ]  Independent [    ]  Supervision [    ]  Needs Assistance [ x   ]  N/A    Ambulation:  [    ]  Independent [    ]  Supervision [    ]  Needs Assistance [xx    ]  N/A   ADL:  [    ]   Independent [ x   ] Requires Assistance [    ] N/A       LABS:                        11.3   2.80  )-----------( 203      ( 11 May 2018 08:32 )             35.2         136  |  88<L>  |  9<L>  ----------------------------<  75  4.0   |  25  |  0.8    Ca    7.0<L>      11 May 2018 08:32  Mg     1.6         TPro  6.8  /  Alb  3.5  /  TBili  0.7  /  DBili  <0.2  /  AST  68<H>  /  ALT  10  /  AlkPhos  94      PT/INR - ( 10 May 2018 17:43 )   PT: T.N.P. sec;   INR: T.N.P. ratio         PTT - ( 10 May 2018 17:43 )  PTT:T.N.P. sec  Urinalysis Basic - ( 10 May 2018 18:35 )    Color: Yellow / Appearance: Clear / S.015 / pH: x  Gluc: x / Ketone: 15  / Bili: Negative / Urobili: 1.0 mg/dL   Blood: x / Protein: 30 mg/dL / Nitrite: Negative   Leuk Esterase: Trace / RBC: x / WBC 10-25 /HPF   Sq Epi: x / Non Sq Epi: Moderate /HPF / Bacteria: Moderate /HPF        RADIOLOGY & ADDITIONAL STUDIES:

## 2018-05-11 NOTE — PROGRESS NOTE ADULT - ASSESSMENT
77 year old pt with h/o asthma, Ra (on prednisone 5 mg daily) pia, pulmonary cement emboli after recent kyphoplasty admitted with complaints of shortness of breath consistent with asthma exacerbation with clinical suspicion for pulmonary edema and concern for underlying chf.    # Asthma exacerbation  - IV Solumedrol  - Duonebs  - Symbicort    # R/o CHF  - Lasix IV  - Obtain echo    # H/o PE  - LE duplex to r/o dvt    # RA  - Takes pred 5 at home, will resume once off of IV solumedrol    # UTI ( H/o E. coli uti & bacteremia )  - On Cefepime  - Switch to meropenem if spikes a fever

## 2018-05-12 LAB
-  AMPICILLIN: SIGNIFICANT CHANGE UP
-  AMPICILLIN: SIGNIFICANT CHANGE UP
-  CIPROFLOXACIN: SIGNIFICANT CHANGE UP
-  CIPROFLOXACIN: SIGNIFICANT CHANGE UP
-  NITROFURANTOIN: SIGNIFICANT CHANGE UP
-  NITROFURANTOIN: SIGNIFICANT CHANGE UP
-  TETRACYCLINE: SIGNIFICANT CHANGE UP
-  TETRACYCLINE: SIGNIFICANT CHANGE UP
-  VANCOMYCIN: SIGNIFICANT CHANGE UP
-  VANCOMYCIN: SIGNIFICANT CHANGE UP
ANION GAP SERPL CALC-SCNC: 18 MMOL/L — HIGH (ref 7–14)
BASOPHILS # BLD AUTO: 0 K/UL — SIGNIFICANT CHANGE UP (ref 0–0.2)
BASOPHILS NFR BLD AUTO: 0 % — SIGNIFICANT CHANGE UP (ref 0–1)
BUN SERPL-MCNC: 13 MG/DL — SIGNIFICANT CHANGE UP (ref 10–20)
CALCIUM SERPL-MCNC: 6.5 MG/DL — LOW (ref 8.5–10.1)
CHLORIDE SERPL-SCNC: 87 MMOL/L — LOW (ref 98–110)
CO2 SERPL-SCNC: 29 MMOL/L — SIGNIFICANT CHANGE UP (ref 17–32)
CREAT SERPL-MCNC: 0.8 MG/DL — SIGNIFICANT CHANGE UP (ref 0.7–1.5)
CULTURE RESULTS: SIGNIFICANT CHANGE UP
EOSINOPHIL # BLD AUTO: 0 K/UL — SIGNIFICANT CHANGE UP (ref 0–0.7)
EOSINOPHIL NFR BLD AUTO: 0 % — SIGNIFICANT CHANGE UP (ref 0–8)
GLUCOSE SERPL-MCNC: 144 MG/DL — HIGH (ref 70–99)
HCT VFR BLD CALC: 35.8 % — LOW (ref 37–47)
HGB BLD-MCNC: 11.6 G/DL — LOW (ref 12–16)
IMM GRANULOCYTES NFR BLD AUTO: 0.5 % — HIGH (ref 0.1–0.3)
LYMPHOCYTES # BLD AUTO: 1.03 K/UL — LOW (ref 1.2–3.4)
LYMPHOCYTES # BLD AUTO: 49.8 % — SIGNIFICANT CHANGE UP (ref 20.5–51.1)
MCHC RBC-ENTMCNC: 30.4 PG — SIGNIFICANT CHANGE UP (ref 27–31)
MCHC RBC-ENTMCNC: 32.4 G/DL — SIGNIFICANT CHANGE UP (ref 32–37)
MCV RBC AUTO: 94 FL — SIGNIFICANT CHANGE UP (ref 81–99)
METHOD TYPE: SIGNIFICANT CHANGE UP
METHOD TYPE: SIGNIFICANT CHANGE UP
MONOCYTES # BLD AUTO: 0.13 K/UL — SIGNIFICANT CHANGE UP (ref 0.1–0.6)
MONOCYTES NFR BLD AUTO: 6.3 % — SIGNIFICANT CHANGE UP (ref 1.7–9.3)
NEUTROPHILS # BLD AUTO: 0.9 K/UL — LOW (ref 1.4–6.5)
NEUTROPHILS NFR BLD AUTO: 43.4 % — SIGNIFICANT CHANGE UP (ref 42.2–75.2)
NRBC # BLD: 0 /100 WBCS — SIGNIFICANT CHANGE UP (ref 0–0)
ORGANISM # SPEC MICROSCOPIC CNT: SIGNIFICANT CHANGE UP
PLATELET # BLD AUTO: 270 K/UL — SIGNIFICANT CHANGE UP (ref 130–400)
POTASSIUM SERPL-MCNC: 3.7 MMOL/L — SIGNIFICANT CHANGE UP (ref 3.5–5)
POTASSIUM SERPL-SCNC: 3.7 MMOL/L — SIGNIFICANT CHANGE UP (ref 3.5–5)
RBC # BLD: 3.81 M/UL — LOW (ref 4.2–5.4)
RBC # FLD: 17 % — HIGH (ref 11.5–14.5)
SODIUM SERPL-SCNC: 134 MMOL/L — LOW (ref 135–146)
SPECIMEN SOURCE: SIGNIFICANT CHANGE UP
WBC # BLD: 2.07 K/UL — LOW (ref 4.8–10.8)
WBC # FLD AUTO: 2.07 K/UL — LOW (ref 4.8–10.8)

## 2018-05-12 RX ORDER — ALPRAZOLAM 0.25 MG
1 TABLET ORAL ONCE
Qty: 0 | Refills: 0 | Status: DISCONTINUED | OUTPATIENT
Start: 2018-05-12 | End: 2018-05-12

## 2018-05-12 RX ORDER — ALPRAZOLAM 0.25 MG
0.5 TABLET ORAL ONCE
Qty: 0 | Refills: 0 | Status: DISCONTINUED | OUTPATIENT
Start: 2018-05-12 | End: 2018-05-12

## 2018-05-12 RX ORDER — ALPRAZOLAM 0.25 MG
0.5 TABLET ORAL
Qty: 0 | Refills: 0 | Status: DISCONTINUED | OUTPATIENT
Start: 2018-05-13 | End: 2018-05-14

## 2018-05-12 RX ORDER — AMPICILLIN TRIHYDRATE 250 MG
1 CAPSULE ORAL ONCE
Qty: 0 | Refills: 0 | Status: COMPLETED | OUTPATIENT
Start: 2018-05-12 | End: 2018-05-12

## 2018-05-12 RX ORDER — AMPICILLIN TRIHYDRATE 250 MG
1 CAPSULE ORAL EVERY 6 HOURS
Qty: 0 | Refills: 0 | Status: DISCONTINUED | OUTPATIENT
Start: 2018-05-12 | End: 2018-05-13

## 2018-05-12 RX ORDER — AMPICILLIN TRIHYDRATE 250 MG
CAPSULE ORAL
Qty: 0 | Refills: 0 | Status: DISCONTINUED | OUTPATIENT
Start: 2018-05-12 | End: 2018-05-13

## 2018-05-12 RX ADMIN — Medication 1 MILLIGRAM(S): at 17:01

## 2018-05-12 RX ADMIN — Medication 108 GRAM(S): at 23:04

## 2018-05-12 RX ADMIN — Medication 75 MICROGRAM(S): at 05:57

## 2018-05-12 RX ADMIN — CARBIDOPA AND LEVODOPA 1 TABLET(S): 25; 100 TABLET ORAL at 05:56

## 2018-05-12 RX ADMIN — Medication 40 MILLIGRAM(S): at 05:58

## 2018-05-12 RX ADMIN — CARBIDOPA AND LEVODOPA 1 TABLET(S): 25; 100 TABLET ORAL at 14:59

## 2018-05-12 RX ADMIN — Medication 60 MILLIGRAM(S): at 06:00

## 2018-05-12 RX ADMIN — Medication 108 GRAM(S): at 12:58

## 2018-05-12 RX ADMIN — Medication 0.5 MILLIGRAM(S): at 22:50

## 2018-05-12 RX ADMIN — BUDESONIDE AND FORMOTEROL FUMARATE DIHYDRATE 2 PUFF(S): 160; 4.5 AEROSOL RESPIRATORY (INHALATION) at 22:52

## 2018-05-12 RX ADMIN — Medication 0.5 MILLIGRAM(S): at 14:59

## 2018-05-12 RX ADMIN — BUDESONIDE AND FORMOTEROL FUMARATE DIHYDRATE 2 PUFF(S): 160; 4.5 AEROSOL RESPIRATORY (INHALATION) at 10:14

## 2018-05-12 RX ADMIN — MONTELUKAST 10 MILLIGRAM(S): 4 TABLET, CHEWABLE ORAL at 22:53

## 2018-05-12 RX ADMIN — ENOXAPARIN SODIUM 40 MILLIGRAM(S): 100 INJECTION SUBCUTANEOUS at 05:58

## 2018-05-12 RX ADMIN — Medication 60 MILLIGRAM(S): at 18:27

## 2018-05-12 RX ADMIN — CEFEPIME 100 MILLIGRAM(S): 1 INJECTION, POWDER, FOR SOLUTION INTRAMUSCULAR; INTRAVENOUS at 05:58

## 2018-05-12 RX ADMIN — Medication 108 GRAM(S): at 18:31

## 2018-05-12 RX ADMIN — CARBIDOPA AND LEVODOPA 1 TABLET(S): 25; 100 TABLET ORAL at 22:53

## 2018-05-12 NOTE — PROGRESS NOTE ADULT - ASSESSMENT
IMPRESSION:  77 year old pt with h/o asthma and rheumatoid arthritis (on prednisone 5 mg daily) DWAYNE, s/p kyphoplasty with pulmonary cement emboli admitted with complaints of shortness of breath consistent with asthma exacerbation with clinical suspicion for pulmonary edema and concern for underlying CHF, elevated BNP      RECOMMENDATIONS:  ·	Continue solumedrol 40 mg iv [id number]  ·	Continue Symbicort and albuterol  ·	Follow up ECHO, continue diuresis  ·	Nocturnal CPAP and daytime O2 to maintain sat >90%  ·	GI and DVT prophylaxis

## 2018-05-12 NOTE — SWALLOW BEDSIDE ASSESSMENT ADULT - COMMENTS
Pt seen for f/u reeval today, reportedly tolerated a few hrs of bipap overnight, now in O2 nasal cannula +overt s/s of penetration/aspiration persists

## 2018-05-12 NOTE — PROGRESS NOTE ADULT - ASSESSMENT
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 Garcia ), recent UTI ( discharged from s site 2 days ptp ), currently being worked up as an o/p for parkinson's disease admitted with complaints of shortness of breath, fever, lethargy, and has retained secretions on exam. Pulm was consulted who felt it was consistent w/ asthma and/or underlying chf and recommended IV Solumedrol, inhalers, and IV lasix. ID was consulted and recommended ampicillin for pyelo although clinically pt has no sxs but has pyuria which may be the cause of her fevers.    # Cough, retained secretions, likely aspiration   - S&S -> NPO until further examination  - Chest PT    # Asthma and/or chf per pulm  - IV Solumedrol  - IV lasix, echo  - Duonebs  - Symbicort    # H/o PE  - LE duplex to r/o dvt    # Recent Kyphoplasty  - C/s NS for eval & f/u    # RA  - Takes pred 5 at home, will resume once off of IV solumedrol    # UTI ( H/o E. coli uti & bacteremia )  - Switched to ampicillin per ID    # Parkinsons  - c/w sinemet 77 year old pt with h/o asthma, Ra (on prednisone 5 mg daily) pia, pulmonary cement emboli after recent kyphoplasty ( last month by Dr. Garcia ), recent UTI ( discharged from s site 2 days ptp ), currently being worked up as an o/p for parkinson's disease admitted with complaints of shortness of breath, fever, lethargy, and has retained secretions on exam. Pulm was consulted who felt it was consistent w/ asthma and/or underlying chf and recommended IV Solumedrol, inhalers, and IV lasix. ID was consulted and recommended ampicillin for pyelo although clinically pt has no sxs but has pyuria which may be the cause of her fevers.    # Cough, retained secretions, likely aspiration   - S&S -> NPO until further examination, NG tube placed today for the meantime, S&S will reassess on Monday  - Chest PT    # Asthma and/or chf per pulm  - IV Solumedrol  - IV lasix, echo  - Duonebs  - Symbicort    # H/o PE  - LE duplex to r/o dvt    # Recent Kyphoplasty  - C/s NS for eval & f/u    # RA  - Takes pred 5 at home, will resume once off of IV solumedrol    # UTI ( H/o E. coli uti & bacteremia )  - Switched to ampicillin per ID    # Parkinsons  - c/w sinemet

## 2018-05-12 NOTE — SWALLOW BEDSIDE ASSESSMENT ADULT - PHARYNGEAL PHASE
Wet vocal quality post oral intake/increased wet respiratory quality after po trials increased wet respiratory quality after po trials increased wet respiratory quality noted after trials

## 2018-05-12 NOTE — CONSULT NOTE ADULT - SUBJECTIVE AND OBJECTIVE BOX
HALINAYONATAN LEVI  77y, Female  Allergy: adhesives (Pruritus; Rash)  Betadine (Flushing (Skin); Pruritus)  NSAIDs (Vomiting; Rash; Nausea)      HPI:  77 yof with multiple comorbidities presents with cc of sob since 1 day ptp found to have fever of 102  - sob started graduallly 1 day ptp; progressively worsening, worsens with exertion; not assoc with cough  - pt has multiple recent admissions for - ecoli uti/ ecoli bacteremia/ cement pulmonary emboli s/p kyphoplasty  - pt lives at home with ; dependent on adls; uses walker for ambulation   - weakness, difficulty clearing secretions  - no nausea, vomiting, no back pain    - In ed- cefepime ivf 1 L; azithro (10 May 2018 21:42)    FAMILY HISTORY:  No pertinent family history in first degree relatives    PAST MEDICAL & SURGICAL HISTORY:  Pulmonary embolism  Seasonal allergies  DWAYNE on CPAP  Obesity: s/p gastric bypass ~15 yrs ago ~100 lb loss  Osteoarthritis  Rheumatoid arthritis  Fibromyalgia  Tremors of nervous system: essential tremors  Psoriatic arthritis  GERD (gastroesophageal reflux disease)  Hypothyroidism: no recent dosage changes  Ankle deformity: s/p b/l surgical repair  H/O gastric bypass        VITALS:  T(F): 97.5, Max: 101.2 (18 @ 20:11)  HR: 99  BP: 103/76  RR: 18Vital Signs Last 24 Hrs  T(C): 36.4 (12 May 2018 05:28), Max: 38.4 (11 May 2018 20:11)  T(F): 97.5 (12 May 2018 05:28), Max: 101.2 (11 May 2018 20:11)  HR: 99 (12 May 2018 05:28) (83 - 113)  BP: 103/76 (12 May 2018 05:28) (103/76 - 169/70)  BP(mean): --  RR: 18 (12 May 2018 05:28) (18 - 20)  SpO2: 98% (11 May 2018 22:27) (98% - 98%)    TESTS & MEASUREMENTS:                        11.3   2.80  )-----------( 203      ( 11 May 2018 08:32 )             35.2         136  |  88<L>  |  9<L>  ----------------------------<  75  4.0   |  25  |  0.8    Ca    7.0<L>      11 May 2018 08:32  Mg     1.6         TPro  6.8  /  Alb  3.5  /  TBili  0.7  /  DBili  <0.2  /  AST  68<H>  /  ALT  10  /  AlkPhos  94      LIVER FUNCTIONS - ( 11 May 2018 08:32 )  Alb: 3.5 g/dL / Pro: 6.8 g/dL / ALK PHOS: 94 U/L / ALT: 10 U/L / AST: 68 U/L / GGT: x             Culture - Blood (collected 05-10-18 @ 23:32)  Source: .Blood None  Preliminary Report (18 @ 06:01):    No growth to date.    Culture - Urine (collected 05-10-18 @ 18:35)  Source: .Urine Clean Catch (Midstream)  Preliminary Report (18 @ 21:10):    >100,000 CFU/ml Enterococcus faecalis    50,000 - 99,000 CFU/mL Enterococcus faecium    Culture - Blood (collected 05-10-18 @ 16:13)  Source: .Blood Blood  Preliminary Report (18 @ 01:02):    No growth to date.      Urinalysis Basic - ( 10 May 2018 18:35 )    Color: Yellow / Appearance: Clear / S.015 / pH: x  Gluc: x / Ketone: 15  / Bili: Negative / Urobili: 1.0 mg/dL   Blood: x / Protein: 30 mg/dL / Nitrite: Negative   Leuk Esterase: Trace / RBC: x / WBC 10-25 /HPF   Sq Epi: x / Non Sq Epi: Moderate /HPF / Bacteria: Moderate /HPF          RADIOLOGY & ADDITIONAL TESTS:    ANTIBIOTICS:  cefepime   IVPB 1000 milliGRAM(s) IV Intermittent every 12 hours

## 2018-05-12 NOTE — CONSULT NOTE ADULT - ASSESSMENT
77 year old pt with h/o asthma, Ra (on prednisone 5 mg daily) pia, pulmonary cement emboli after recent kyphoplasty admitted with complaints of shortness of breath consistent with asthma exacerbation with clinical suspicion for pulmonary edema and concern for underlying chf    solumedrol 40 mg iv [id number]  symbicort 150/4.5 2 puff bid  albuterol nebs q 4 hours atc x 24 hours then prn  obtain echo and bnp  lasix 40 mg iv q12 hours  monitor lytes, cr and i/os  obtain le duplex  cpap at night  dvt/gi px  d/w family at bedside as well house staff
IMPRESSION: Rehab of debilitation    PRECAUTIONS: [ x   ] Cardiac  [x    ] Respiratory  [    ] Seizures [    ] Contact Isolation  [    ] Droplet Isolation  [    ] Other    Weight Bearing Status:     RECOMMENDATION:  NEED OOB ORDERS BY MEDICINE    Out of Bed to Chair     DVT/Decubiti Prophylaxis    REHAB PLAN:     [    x ] Bedside P/T 3-5 times a week   [     ] Bedside O/T  2-3 times a week   [     ] No Rehab Therapy Indicated   [     ]  Speech Therapy   Conditioning/ROM                                 ADL  Bed Mobility                                            Conditioning/ROM  Transfers                                                  Bed Mobility  Sitting /Standing Balance                      Transfers                                        Gait Training                                            Sitting/Standing Balance  Stair Training [   ]Applicable                 Home equipment Eval                                                                     Splinting  [   ] Only      GOALS:   ADL   [  xx  ]   Independent         Transfers  [    ] Independent            Ambulation  [  xx   ] Independent     [x    ] With device                            [    ]  CG                                               [    ]  CG                                                    [     ] CG                            [    ] Min A                                          [    ] Min A                                                [     ] Min  A          DISCHARGE PLAN:   [     ]  Good candidate for Intensive Rehabilitation/Hospital based-[facility]A [facility]                                             Will tolerate 3hrs Intensive Rehab Daily                                       [      ]  Short Term Rehab in Skilled Nursing Facility                                       [    x  ]  Home with Outpatient or  services                                         [      ]  Possible Candidate for Intensive Hospital based Rehab
IMPRESSION:  CHF.  No PNA.  Pyelo as cause of fevers although clinically has no symptoms.  Has pyuria.    RECOMMENDATIONS:  Ampicillin 1gm iv q8h.   D/c cfepime

## 2018-05-12 NOTE — PROGRESS NOTE ADULT - ASSESSMENT
A/P    Pt was discharged from [facility] 2 days ago after being treated for UTI  Last month she had Kyphoplasty at [place] by Dr Garcia  She has cough and retained secretions and had fever  feels weak     A/p    1-Fever, cough  on exam has retained secretions  Likely Aspiration  h/o Parkinsons disaese  Speech and swallow recommended NPO yresterday  today F/U pending  Aspiration precautions  Chest PT  continue nebs/steroids      2-Possible CHF  continue IV lasix  Follow up echo    3- UTI/Pyelo urine culture previously  was positive for E coli  Repeat cultures preliminary enterococci  seen by ID recommended to change Abx to Ampiccin   Follow repeat cultures       4- Parkinsons disease    continue sinemet    5- H/O recent kyphoplasty  no back pain  d/c morphine, raboxin  NS eval    6-  h/o Psoriatic arthritis  Hold Methotraxate  continue steroids    7- Hypothyroidism  on Levothyroxine    8- DVT/GI prophylaxis  s/c heparin/PPIs    Discussed with Daughter at bedside    Case discussed with Resident taking care of the Patient today  Resident Progress note reviewed

## 2018-05-13 LAB
BASOPHILS # BLD AUTO: 0.02 K/UL — SIGNIFICANT CHANGE UP (ref 0–0.2)
BASOPHILS NFR BLD AUTO: 0.4 % — SIGNIFICANT CHANGE UP (ref 0–1)
EOSINOPHIL # BLD AUTO: 0 K/UL — SIGNIFICANT CHANGE UP (ref 0–0.7)
EOSINOPHIL NFR BLD AUTO: 0 % — SIGNIFICANT CHANGE UP (ref 0–8)
HCT VFR BLD CALC: 41.5 % — SIGNIFICANT CHANGE UP (ref 37–47)
HGB BLD-MCNC: 13.4 G/DL — SIGNIFICANT CHANGE UP (ref 12–16)
IMM GRANULOCYTES NFR BLD AUTO: 0.6 % — HIGH (ref 0.1–0.3)
LYMPHOCYTES # BLD AUTO: 1.95 K/UL — SIGNIFICANT CHANGE UP (ref 1.2–3.4)
LYMPHOCYTES # BLD AUTO: 42.1 % — SIGNIFICANT CHANGE UP (ref 20.5–51.1)
MCHC RBC-ENTMCNC: 30.6 PG — SIGNIFICANT CHANGE UP (ref 27–31)
MCHC RBC-ENTMCNC: 32.3 G/DL — SIGNIFICANT CHANGE UP (ref 32–37)
MCV RBC AUTO: 94.7 FL — SIGNIFICANT CHANGE UP (ref 81–99)
MONOCYTES # BLD AUTO: 0.62 K/UL — HIGH (ref 0.1–0.6)
MONOCYTES NFR BLD AUTO: 13.4 % — HIGH (ref 1.7–9.3)
NEUTROPHILS # BLD AUTO: 2.01 K/UL — SIGNIFICANT CHANGE UP (ref 1.4–6.5)
NEUTROPHILS NFR BLD AUTO: 43.5 % — SIGNIFICANT CHANGE UP (ref 42.2–75.2)
NRBC # BLD: 0 /100 WBCS — SIGNIFICANT CHANGE UP (ref 0–0)
PLATELET # BLD AUTO: 414 K/UL — HIGH (ref 130–400)
RBC # BLD: 4.38 M/UL — SIGNIFICANT CHANGE UP (ref 4.2–5.4)
RBC # FLD: 17.5 % — HIGH (ref 11.5–14.5)
WBC # BLD: 4.63 K/UL — LOW (ref 4.8–10.8)
WBC # FLD AUTO: 4.63 K/UL — LOW (ref 4.8–10.8)

## 2018-05-13 RX ORDER — HYDROMORPHONE HYDROCHLORIDE 2 MG/ML
0.5 INJECTION INTRAMUSCULAR; INTRAVENOUS; SUBCUTANEOUS ONCE
Qty: 0 | Refills: 0 | Status: DISCONTINUED | OUTPATIENT
Start: 2018-05-13 | End: 2018-05-13

## 2018-05-13 RX ORDER — LINEZOLID 600 MG/300ML
600 INJECTION, SOLUTION INTRAVENOUS EVERY 12 HOURS
Qty: 0 | Refills: 0 | Status: DISCONTINUED | OUTPATIENT
Start: 2018-05-13 | End: 2018-05-14

## 2018-05-13 RX ORDER — LINEZOLID 600 MG/300ML
INJECTION, SOLUTION INTRAVENOUS
Qty: 0 | Refills: 0 | Status: DISCONTINUED | OUTPATIENT
Start: 2018-05-13 | End: 2018-05-14

## 2018-05-13 RX ORDER — LINEZOLID 600 MG/300ML
600 INJECTION, SOLUTION INTRAVENOUS ONCE
Qty: 0 | Refills: 0 | Status: COMPLETED | OUTPATIENT
Start: 2018-05-13 | End: 2018-05-13

## 2018-05-13 RX ADMIN — MONTELUKAST 10 MILLIGRAM(S): 4 TABLET, CHEWABLE ORAL at 21:42

## 2018-05-13 RX ADMIN — CARBIDOPA AND LEVODOPA 1 TABLET(S): 25; 100 TABLET ORAL at 06:13

## 2018-05-13 RX ADMIN — Medication 1 TABLET(S): at 11:43

## 2018-05-13 RX ADMIN — Medication 1 MILLIGRAM(S): at 11:43

## 2018-05-13 RX ADMIN — Medication 40 MILLIGRAM(S): at 06:15

## 2018-05-13 RX ADMIN — BUDESONIDE AND FORMOTEROL FUMARATE DIHYDRATE 2 PUFF(S): 160; 4.5 AEROSOL RESPIRATORY (INHALATION) at 08:42

## 2018-05-13 RX ADMIN — CARBIDOPA AND LEVODOPA 1 TABLET(S): 25; 100 TABLET ORAL at 21:42

## 2018-05-13 RX ADMIN — LINEZOLID 300 MILLIGRAM(S): 600 INJECTION, SOLUTION INTRAVENOUS at 18:14

## 2018-05-13 RX ADMIN — Medication 3 MILLILITER(S): at 19:05

## 2018-05-13 RX ADMIN — HYDROMORPHONE HYDROCHLORIDE 0.5 MILLIGRAM(S): 2 INJECTION INTRAMUSCULAR; INTRAVENOUS; SUBCUTANEOUS at 16:46

## 2018-05-13 RX ADMIN — CARBIDOPA AND LEVODOPA 1 TABLET(S): 25; 100 TABLET ORAL at 15:13

## 2018-05-13 RX ADMIN — Medication 3 MILLILITER(S): at 08:00

## 2018-05-13 RX ADMIN — Medication 60 MILLIGRAM(S): at 18:14

## 2018-05-13 RX ADMIN — Medication 75 MICROGRAM(S): at 06:16

## 2018-05-13 RX ADMIN — Medication 108 GRAM(S): at 06:14

## 2018-05-13 RX ADMIN — LINEZOLID 300 MILLIGRAM(S): 600 INJECTION, SOLUTION INTRAVENOUS at 10:11

## 2018-05-13 RX ADMIN — Medication 60 MILLIGRAM(S): at 06:16

## 2018-05-13 RX ADMIN — BUDESONIDE AND FORMOTEROL FUMARATE DIHYDRATE 2 PUFF(S): 160; 4.5 AEROSOL RESPIRATORY (INHALATION) at 21:42

## 2018-05-13 RX ADMIN — ENOXAPARIN SODIUM 40 MILLIGRAM(S): 100 INJECTION SUBCUTANEOUS at 06:16

## 2018-05-13 NOTE — CONSULT NOTE ADULT - SUBJECTIVE AND OBJECTIVE BOX
HPI:  77 yof with multiple comorbidities presents with cc of sob since 1 day ptp found to have fever of 102  - sob started graduallly 1 day ptp; progressively worsening, worsens with exertion; not assoc with cough  - pt has multiple recent admissions for - ecoli uti/ ecoli bacteremia/ cement pulmonary emboli s/p kyphoplasty  - pt lives at home with ; dependent on adls; uses walker for ambulation   - weakness, difficulty clearing secretions  - no nausea, vomiting, no back pain    - In ed- cefepime ivf 1 L; azithro (10 May 2018 21:42)    Neurosurgery called to follow up due to kyphoplasty with complication of cement emboli. Pt seen and examined at bedside. Pt not complaining of any back or radicular pain at this time.     PAST MEDICAL & SURGICAL HISTORY:  Pulmonary embolism  Seasonal allergies  DWAYNE on CPAP  Obesity: s/p gastric bypass ~15 yrs ago ~100 lb loss  Osteoarthritis  Rheumatoid arthritis  Fibromyalgia  Tremors of nervous system: essential tremors  Psoriatic arthritis  GERD (gastroesophageal reflux disease)  Hypothyroidism: no recent dosage changes  Ankle deformity: s/p b/l surgical repair  H/O gastric bypass      MEDICATIONS  (STANDING):  ALBUTerol/ipratropium for Nebulization 3 milliLiter(s) Nebulizer every 6 hours  buDESOnide  80 MICROgram(s)/formoterol 4.5 MICROgram(s) Inhaler 2 Puff(s) Inhalation two times a day  calcium carbonate 1250 mG + Vitamin D (OsCal 500 + D) 1 Tablet(s) Oral daily  carbidopa/levodopa  10/100 1 Tablet(s) Oral three times a day  enoxaparin Injectable 40 milliGRAM(s) SubCutaneous every 24 hours  folic acid 1 milliGRAM(s) Oral daily  furosemide   Injectable 40 milliGRAM(s) IV Push daily  levothyroxine 75 MICROGram(s) Oral daily  linezolid  IVPB 600 milliGRAM(s) IV Intermittent every 12 hours  linezolid  IVPB      methylPREDNISolone sodium succinate Injectable 60 milliGRAM(s) IV Push two times a day  montelukast 10 milliGRAM(s) Oral at bedtime    MEDICATIONS  (PRN):  acetaminophen   Tablet 650 milliGRAM(s) Oral every 6 hours PRN For Temp greater than 38 C (100.4 F)  ALPRAZolam 0.5 milliGRAM(s) Oral two times a day PRN anxiety        T(C): 36.4 (05-13-18 @ 13:16), Max: 36.4 (05-13-18 @ 05:04)  HR: 95 (05-13-18 @ 13:16) (85 - 106)  BP: 129/58 (05-13-18 @ 13:16) (129/58 - 147/63)  RR: 20 (05-13-18 @ 13:16) (20 - 20)  SpO2: 98% (05-13-18 @ 00:00) (90% - 98%)  Wt(kg): --    Exam:        CBC Full  -  ( 13 May 2018 08:31 )  WBC Count : 4.63 K/uL  Hemoglobin : 13.4 g/dL  Hematocrit : 41.5 %  Platelet Count - Automated : 414 K/uL  Mean Cell Volume : 94.7 fL  Mean Cell Hemoglobin : 30.6 pg  Mean Cell Hemoglobin Concentration : 32.3 g/dL  Auto Neutrophil # : 2.01 K/uL  Auto Lymphocyte # : 1.95 K/uL  Auto Monocyte # : 0.62 K/uL  Auto Eosinophil # : 0.00 K/uL  Auto Basophil # : 0.02 K/uL  Auto Neutrophil % : 43.5 %  Auto Lymphocyte % : 42.1 %  Auto Monocyte % : 13.4 %  Auto Eosinophil % : 0.0 %  Auto Basophil % : 0.4 %    05-12    134<L>  |  87<L>  |  13  ----------------------------<  144<H>  3.7   |  29  |  0.8    Ca    6.5<L>      12 May 2018 07:09    Imaging:  < from: Xray Chest 1 View-PORTABLE IMMEDIATE (05.12.18 @ 22:39) >  Impression:      No airspace opacities, pleural effusions or pneumothoraces.    Feeding tube position appropriately.    < end of copied text >        Assessment/Plan: as above  no back pain so no need for muscle relaxant or pain med  cont ID for VRE ecoli  cont speech and swallow eval  d/w attending HPI:  77 yof with multiple comorbidities presents with cc of sob since 1 day ptp found to have fever of 102  - sob started graduallly 1 day ptp; progressively worsening, worsens with exertion; not assoc with cough  - pt has multiple recent admissions for - ecoli uti/ ecoli bacteremia/ cement pulmonary emboli s/p kyphoplasty  - pt lives at home with ; dependent on adls; uses walker for ambulation   - weakness, difficulty clearing secretions  - no nausea, vomiting, no back pain    - In ed- cefepime ivf 1 L; azithro (10 May 2018 21:42)    Neurosurgery called to follow up due to kyphoplasty with complication of cement emboli. Pt seen and examined at bedside. Pt not complaining of any back or radicular pain at this time.     PAST MEDICAL & SURGICAL HISTORY:  Pulmonary embolism  Seasonal allergies  DWAYNE on CPAP  Obesity: s/p gastric bypass ~15 yrs ago ~100 lb loss  Osteoarthritis  Rheumatoid arthritis  Fibromyalgia  Tremors of nervous system: essential tremors  Psoriatic arthritis  GERD (gastroesophageal reflux disease)  Hypothyroidism: no recent dosage changes  Ankle deformity: s/p b/l surgical repair  H/O gastric bypass      MEDICATIONS  (STANDING):  ALBUTerol/ipratropium for Nebulization 3 milliLiter(s) Nebulizer every 6 hours  buDESOnide  80 MICROgram(s)/formoterol 4.5 MICROgram(s) Inhaler 2 Puff(s) Inhalation two times a day  calcium carbonate 1250 mG + Vitamin D (OsCal 500 + D) 1 Tablet(s) Oral daily  carbidopa/levodopa  10/100 1 Tablet(s) Oral three times a day  enoxaparin Injectable 40 milliGRAM(s) SubCutaneous every 24 hours  folic acid 1 milliGRAM(s) Oral daily  furosemide   Injectable 40 milliGRAM(s) IV Push daily  levothyroxine 75 MICROGram(s) Oral daily  linezolid  IVPB 600 milliGRAM(s) IV Intermittent every 12 hours  linezolid  IVPB      methylPREDNISolone sodium succinate Injectable 60 milliGRAM(s) IV Push two times a day  montelukast 10 milliGRAM(s) Oral at bedtime    MEDICATIONS  (PRN):  acetaminophen   Tablet 650 milliGRAM(s) Oral every 6 hours PRN For Temp greater than 38 C (100.4 F)  ALPRAZolam 0.5 milliGRAM(s) Oral two times a day PRN anxiety        T(C): 36.4 (05-13-18 @ 13:16), Max: 36.4 (05-13-18 @ 05:04)  HR: 95 (05-13-18 @ 13:16) (85 - 106)  BP: 129/58 (05-13-18 @ 13:16) (129/58 - 147/63)  RR: 20 (05-13-18 @ 13:16) (20 - 20)  SpO2: 98% (05-13-18 @ 00:00) (90% - 98%)      Exam:  Pt appears lethargic but arousable and responsive to follow commands  nods yes to pain  follows commands   MAEx4, overall weak  sensation intact       CBC Full  -  ( 13 May 2018 08:31 )  WBC Count : 4.63 K/uL  Hemoglobin : 13.4 g/dL  Hematocrit : 41.5 %  Platelet Count - Automated : 414 K/uL  Mean Cell Volume : 94.7 fL  Mean Cell Hemoglobin : 30.6 pg  Mean Cell Hemoglobin Concentration : 32.3 g/dL  Auto Neutrophil # : 2.01 K/uL  Auto Lymphocyte # : 1.95 K/uL  Auto Monocyte # : 0.62 K/uL  Auto Eosinophil # : 0.00 K/uL  Auto Basophil # : 0.02 K/uL  Auto Neutrophil % : 43.5 %  Auto Lymphocyte % : 42.1 %  Auto Monocyte % : 13.4 %  Auto Eosinophil % : 0.0 %  Auto Basophil % : 0.4 %    05-12    134<L>  |  87<L>  |  13  ----------------------------<  144<H>  3.7   |  29  |  0.8    Ca    6.5<L>      12 May 2018 07:09    Imaging:  < from: Xray Chest 1 View-PORTABLE IMMEDIATE (05.12.18 @ 22:39) >  Impression:      No airspace opacities, pleural effusions or pneumothoraces.    Feeding tube position appropriately.    < end of copied text >        Assessment/Plan: as above  no back pain so no need for muscle relaxant or pain med  cont ID for VRE ecoli  cont speech and swallow eval  copnt medicine care  d/w attending

## 2018-05-13 NOTE — PROGRESS NOTE ADULT - ASSESSMENT
A/P    Pt was discharged from [facility] 2 days ago after being treated for UTI  Last month she had Kyphoplasty at [place] by Dr Garcia  She has cough and retained secretions and had fever  feels weak     A/p    1-Fever, cough  on exam has retained secretions  Likely Aspiration  h/o Parkinsons disaese  Speech and swallow recommended NPO yresterday  Place NG tube for feeding for now  reeval with speech and swallow in am  Aspiration precautions  Chest PT  continue nebs/steroids      2-Possible CHF  continue IV lasix today  daily I/O and BMP to follow electrolytes  Follow up echo    3- UTI/Pyelo urine culture previously  was positive for E coli  Repeat cultures shows VRE  seen by ID recommended to change Abx to Linezolid  Follow repeat cultures       4- Parkinsons disease    continue sinemet    5- H/O recent kyphoplasty  no back pain  d/c morphine, raboxin  NS eval    6-  h/o Psoriatic arthritis  Hold Methotraxate  continue steroids    7- Hypothyroidism  on Levothyroxine    8- DVT/GI prophylaxis  s/c heparin/PPIs    Discussed with Daughter at bedside    Case discussed with Resident taking care of the Patient today  Resident Progress note reviewed

## 2018-05-13 NOTE — PROGRESS NOTE ADULT - ASSESSMENT
IMPRESSION:  CHF.  No PNA.  Pyelo as cause of fevers although clinically has no symptoms.  Has pyuria with polymicrobial bacteuria    RECOMMENDATIONS:  Zyvox 600mg iv q12h for 7 days. Could change to po once pt can eat.    Recall prn please.

## 2018-05-13 NOTE — CONSULT NOTE ADULT - ATTENDING COMMENTS
Pt seen and examined.   Here for respiratory failure.  Shakes head yes minimally when asked re: pain, but family states pt has been mobile without overt complaints of pain.   Moves legs spontaneously.    Will inform Dr. Garcia, primary neurosurgeon re: pt's admission.

## 2018-05-14 LAB
ANION GAP SERPL CALC-SCNC: 19 MMOL/L — HIGH (ref 7–14)
BASOPHILS # BLD AUTO: 0.01 K/UL — SIGNIFICANT CHANGE UP (ref 0–0.2)
BASOPHILS NFR BLD AUTO: 0.3 % — SIGNIFICANT CHANGE UP (ref 0–1)
BUN SERPL-MCNC: 27 MG/DL — HIGH (ref 10–20)
C DIFF BY PCR RESULT: NEGATIVE — SIGNIFICANT CHANGE UP
C DIFF TOX GENS STL QL NAA+PROBE: SIGNIFICANT CHANGE UP
CALCIUM SERPL-MCNC: 6 MG/DL — LOW (ref 8.5–10.1)
CHLORIDE SERPL-SCNC: 89 MMOL/L — LOW (ref 98–110)
CO2 SERPL-SCNC: 30 MMOL/L — SIGNIFICANT CHANGE UP (ref 17–32)
CREAT SERPL-MCNC: 1 MG/DL — SIGNIFICANT CHANGE UP (ref 0.7–1.5)
EOSINOPHIL # BLD AUTO: 0 K/UL — SIGNIFICANT CHANGE UP (ref 0–0.7)
EOSINOPHIL NFR BLD AUTO: 0 % — SIGNIFICANT CHANGE UP (ref 0–8)
GLUCOSE SERPL-MCNC: 276 MG/DL — HIGH (ref 70–99)
HCT VFR BLD CALC: 32 % — LOW (ref 37–47)
HGB BLD-MCNC: 10.3 G/DL — LOW (ref 12–16)
IMM GRANULOCYTES NFR BLD AUTO: 2.8 % — HIGH (ref 0.1–0.3)
LYMPHOCYTES # BLD AUTO: 1.69 K/UL — SIGNIFICANT CHANGE UP (ref 1.2–3.4)
LYMPHOCYTES # BLD AUTO: 42.5 % — SIGNIFICANT CHANGE UP (ref 20.5–51.1)
MCHC RBC-ENTMCNC: 30.6 PG — SIGNIFICANT CHANGE UP (ref 27–31)
MCHC RBC-ENTMCNC: 32.2 G/DL — SIGNIFICANT CHANGE UP (ref 32–37)
MCV RBC AUTO: 95 FL — SIGNIFICANT CHANGE UP (ref 81–99)
MONOCYTES # BLD AUTO: 0.72 K/UL — HIGH (ref 0.1–0.6)
MONOCYTES NFR BLD AUTO: 18.1 % — HIGH (ref 1.7–9.3)
NEUTROPHILS # BLD AUTO: 1.45 K/UL — SIGNIFICANT CHANGE UP (ref 1.4–6.5)
NEUTROPHILS NFR BLD AUTO: 36.3 % — LOW (ref 42.2–75.2)
NRBC # BLD: 0 /100 WBCS — SIGNIFICANT CHANGE UP (ref 0–0)
PLATELET # BLD AUTO: 462 K/UL — HIGH (ref 130–400)
POTASSIUM SERPL-MCNC: 3.7 MMOL/L — SIGNIFICANT CHANGE UP (ref 3.5–5)
POTASSIUM SERPL-SCNC: 3.7 MMOL/L — SIGNIFICANT CHANGE UP (ref 3.5–5)
RBC # BLD: 3.37 M/UL — LOW (ref 4.2–5.4)
RBC # FLD: 17.3 % — HIGH (ref 11.5–14.5)
SODIUM SERPL-SCNC: 138 MMOL/L — SIGNIFICANT CHANGE UP (ref 135–146)
WBC # BLD: 3.98 K/UL — LOW (ref 4.8–10.8)
WBC # FLD AUTO: 3.98 K/UL — LOW (ref 4.8–10.8)

## 2018-05-14 RX ORDER — FUROSEMIDE 40 MG
40 TABLET ORAL DAILY
Qty: 0 | Refills: 0 | Status: DISCONTINUED | OUTPATIENT
Start: 2018-05-14 | End: 2018-05-16

## 2018-05-14 RX ORDER — MORPHINE SULFATE 50 MG/1
15 CAPSULE, EXTENDED RELEASE ORAL
Qty: 0 | Refills: 0 | Status: DISCONTINUED | OUTPATIENT
Start: 2018-05-14 | End: 2018-05-16

## 2018-05-14 RX ORDER — HYDROMORPHONE HYDROCHLORIDE 2 MG/ML
0.5 INJECTION INTRAMUSCULAR; INTRAVENOUS; SUBCUTANEOUS EVERY 12 HOURS
Qty: 0 | Refills: 0 | Status: DISCONTINUED | OUTPATIENT
Start: 2018-05-14 | End: 2018-05-14

## 2018-05-14 RX ORDER — MORPHINE SULFATE 50 MG/1
30 CAPSULE, EXTENDED RELEASE ORAL
Qty: 0 | Refills: 0 | Status: DISCONTINUED | OUTPATIENT
Start: 2018-05-14 | End: 2018-05-16

## 2018-05-14 RX ORDER — LINEZOLID 600 MG/300ML
600 INJECTION, SOLUTION INTRAVENOUS EVERY 12 HOURS
Qty: 0 | Refills: 0 | Status: DISCONTINUED | OUTPATIENT
Start: 2018-05-14 | End: 2018-05-16

## 2018-05-14 RX ORDER — HYDROMORPHONE HYDROCHLORIDE 2 MG/ML
2 INJECTION INTRAMUSCULAR; INTRAVENOUS; SUBCUTANEOUS ONCE
Qty: 0 | Refills: 0 | Status: DISCONTINUED | OUTPATIENT
Start: 2018-05-14 | End: 2018-05-14

## 2018-05-14 RX ORDER — FENTANYL CITRATE 50 UG/ML
1 INJECTION INTRAVENOUS
Qty: 0 | Refills: 0 | Status: DISCONTINUED | OUTPATIENT
Start: 2018-05-14 | End: 2018-05-16

## 2018-05-14 RX ADMIN — ENOXAPARIN SODIUM 40 MILLIGRAM(S): 100 INJECTION SUBCUTANEOUS at 05:27

## 2018-05-14 RX ADMIN — LINEZOLID 600 MILLIGRAM(S): 600 INJECTION, SOLUTION INTRAVENOUS at 18:50

## 2018-05-14 RX ADMIN — BUDESONIDE AND FORMOTEROL FUMARATE DIHYDRATE 2 PUFF(S): 160; 4.5 AEROSOL RESPIRATORY (INHALATION) at 21:37

## 2018-05-14 RX ADMIN — FENTANYL CITRATE 1 PATCH: 50 INJECTION INTRAVENOUS at 18:50

## 2018-05-14 RX ADMIN — HYDROMORPHONE HYDROCHLORIDE 2 MILLIGRAM(S): 2 INJECTION INTRAMUSCULAR; INTRAVENOUS; SUBCUTANEOUS at 02:37

## 2018-05-14 RX ADMIN — Medication 1 TABLET(S): at 12:41

## 2018-05-14 RX ADMIN — Medication 60 MILLIGRAM(S): at 05:25

## 2018-05-14 RX ADMIN — HYDROMORPHONE HYDROCHLORIDE 2 MILLIGRAM(S): 2 INJECTION INTRAMUSCULAR; INTRAVENOUS; SUBCUTANEOUS at 02:07

## 2018-05-14 RX ADMIN — Medication 40 MILLIGRAM(S): at 05:25

## 2018-05-14 RX ADMIN — MORPHINE SULFATE 30 MILLIGRAM(S): 50 CAPSULE, EXTENDED RELEASE ORAL at 17:36

## 2018-05-14 RX ADMIN — CARBIDOPA AND LEVODOPA 1 TABLET(S): 25; 100 TABLET ORAL at 21:34

## 2018-05-14 RX ADMIN — BUDESONIDE AND FORMOTEROL FUMARATE DIHYDRATE 2 PUFF(S): 160; 4.5 AEROSOL RESPIRATORY (INHALATION) at 12:41

## 2018-05-14 RX ADMIN — LINEZOLID 300 MILLIGRAM(S): 600 INJECTION, SOLUTION INTRAVENOUS at 05:33

## 2018-05-14 RX ADMIN — CARBIDOPA AND LEVODOPA 1 TABLET(S): 25; 100 TABLET ORAL at 05:26

## 2018-05-14 RX ADMIN — Medication 3 MILLILITER(S): at 08:55

## 2018-05-14 RX ADMIN — Medication 3 MILLILITER(S): at 13:30

## 2018-05-14 RX ADMIN — Medication 75 MICROGRAM(S): at 05:26

## 2018-05-14 RX ADMIN — Medication 3 MILLILITER(S): at 19:36

## 2018-05-14 RX ADMIN — MORPHINE SULFATE 30 MILLIGRAM(S): 50 CAPSULE, EXTENDED RELEASE ORAL at 19:41

## 2018-05-14 RX ADMIN — Medication 1 MILLIGRAM(S): at 12:41

## 2018-05-14 RX ADMIN — CARBIDOPA AND LEVODOPA 1 TABLET(S): 25; 100 TABLET ORAL at 17:25

## 2018-05-14 RX ADMIN — Medication 40 MILLIGRAM(S): at 17:41

## 2018-05-14 RX ADMIN — Medication 40 MILLIGRAM(S): at 18:52

## 2018-05-14 RX ADMIN — MONTELUKAST 10 MILLIGRAM(S): 4 TABLET, CHEWABLE ORAL at 21:34

## 2018-05-14 NOTE — PROGRESS NOTE ADULT - ASSESSMENT
IMPRESSION:  77 year old pt with h/o asthma and rheumatoid arthritis (on prednisone 5 mg daily) DWAYNE, s/p kyphoplasty with pulmonary cement emboli admitted with complaints of shortness of breath consistent with asthma exacerbation with clinical suspicion for pulmonary edema and concern for underlying CHF, elevated BNP      RECOMMENDATIONS:  ·	Continue solumedrol 40 mg iv [id number]  ·	Continue Symbicort and albuterol  ·	Diuresis  ·	Nocturnal CPAP and daytime O2 to maintain sat >90%  ·	GI and DVT prophylaxis

## 2018-05-14 NOTE — SWALLOW FEES ASSESSMENT ADULT - NS SWALLOW FEES REC ASPIR MON
upper respiratory infection/fever/throat clearing/oral hygiene/position upright (90Y)/cough/gurgly voice/change of breathing pattern/pneumonia/pt does not cough or throat clear during penetration/aspiration events

## 2018-05-14 NOTE — PROGRESS NOTE ADULT - ASSESSMENT
IMPRESSION:  77 year old pt with h/o asthma and rheumatoid arthritis (on prednisone 5 mg daily) DWAYNE, s/p kyphoplasty with pulmonary cement emboli admitted with complaints of shortness of breath consistent with asthma exacerbation with clinical suspicion for pulmonary edema and concern for underlying CHF- diaslotic dysfxn      RECOMMENDATIONS:  ·	d/c iv steroids- po prednisone 40 mg for 5 days  ·	Continue Symbicort and albuterol  ·	avoid volume overload  ·	CPAP at night  ·	aspiration precautions  ·	GI and DVT prophylaxis'  ·	stable from pulmonary standpoint  ·	d/w family at bedside

## 2018-05-14 NOTE — CHART NOTE - NSCHARTNOTEFT_GEN_A_CORE
Neurosurgery Attending    Patient's back pain is resolved. Asked by medical service to comment on clearance for pt, hit therapy, or other treatments.  There is no contraindication for any treatment at this time.

## 2018-05-14 NOTE — SWALLOW FEES ASSESSMENT ADULT - PHARYNGEAL PHASE COMMENTS
severe pharyngeal dysphagia mod pharyngeal dysphagia moderate pharyngeal dysphagia moderate pharyngeal dysphagia; mild oral dysphagia

## 2018-05-14 NOTE — SWALLOW FEES ASSESSMENT ADULT - ROSENBEK'S PENETRATION ASPIRATION SCALE
(8) material passes glottis, visible subglottic residue remains, absent patient response (aspiration) (5) material contacts vocal cords, visible residue remains (penetration) (4) material contacts vocal cords, no residue remains (penetration)

## 2018-05-14 NOTE — SWALLOW FEES ASSESSMENT ADULT - ESOPHAGEAL PHASE
suspected reflux or esophageal dysfunction 2/2 mild backflow noted suspected reflux 2/2 backflow noted and belching

## 2018-05-14 NOTE — SWALLOW FEES ASSESSMENT ADULT - DIAGNOSTIC IMPRESSIONS
severe pharyngeal dysphagia for thins. moderate pharyngeal dysphagia for puree, soft and nectar thick liquids severe pharyngeal dysphagia for thins. moderate pharyngeal dysphagia for puree, soft and nectar thick liquids; mild oral dysphagia for soft

## 2018-05-14 NOTE — PHYSICAL THERAPY INITIAL EVALUATION ADULT - GENERAL OBSERVATIONS, REHAB EVAL
14:40-15:22. Pt encountered semifowler in bed in NAD, son at b/s, MD at b/s. Pt agreeable to PT, had to use bedpan. Valeda assisted pt onto bedpan. Cliff PCA doffed pt off bedpan.

## 2018-05-14 NOTE — PROGRESS NOTE ADULT - ASSESSMENT
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 Garcia ), recent UTI ( discharged from s site 2 days ptp ), currently being worked up as an o/p for parkinson's disease admitted with complaints of shortness of breath, fever, lethargy, and has retained secretions on exam. Pulm was consulted who felt it was consistent w/ asthma and/or underlying chf and recommended IV Solumedrol, inhalers, and IV lasix. ID was consulted and recommended ampicillin for pyelo although clinically pt has no sxs but has pyuria which may be the cause of her fevers.    # Cough, retained secretions, likely aspiration   - ng tube removed, f/u w/ S&S  - Chest PT    # Asthma and/or chf per pulm  - IV Solumedrol  - IV lasix, echo  - Duonebs  - Symbicort    # H/o PE  - LE duplex to r/o dvt    # Recent Kyphoplasty  - C/s NS for eval & f/u    # RA  - Takes pred 5 at home, will resume once off of IV solumedrol    # UTI ( H/o E. coli uti & bacteremia ) -> grew VRE  - Switched to Zyvox per ID    # Parkinsons  - c/w sinemet 77 year old pt with h/o asthma, Ra (on prednisone 5 mg daily) pia, pulmonary cement emboli after recent kyphoplasty ( last month by Dr. Garcia ), recent UTI ( discharged from s site 2 days ptp ), currently being worked up as an o/p for parkinson's disease admitted with complaints of shortness of breath, fever, lethargy, and has retained secretions on exam. Pulm was consulted who felt it was consistent w/ asthma and/or underlying chf and recommended IV Solumedrol, inhalers, and IV lasix. ID was consulted and recommended ampicillin for pyelo although clinically pt has no sxs but has pyuria which may be the cause of her fevers.    # Cough, retained secretions, likely aspiration   - ng tube removed, f/u w/ S&S  - Chest PT    # Asthma and/or chf per pulm  - IV Solumedrol  - IV lasix, echo  - Duonebs  - Symbicort    # H/o PE  - LE duplex to r/o dvt pending    # Recent Kyphoplasty  - NS-> no recs    # RA  - Takes pred 5 at home, will resume once off of IV solumedrol    # UTI ( H/o E. coli uti & bacteremia ) -> grew VRE ( is on contact isolation )  - Switched to Zyvox per ID    # Parkinsons  - c/w sinemet 77 year old pt with h/o asthma, Ra (on prednisone 5 mg daily) pia, pulmonary cement emboli after recent kyphoplasty ( last month by Dr. Garcia ), recent UTI ( discharged from s site 2 days ptp ), currently being worked up as an o/p for parkinson's disease admitted with complaints of shortness of breath, fever, lethargy, and has retained secretions on exam. Pulm was consulted who felt it was consistent w/ asthma and/or underlying chf and recommended IV Solumedrol, inhalers, and IV lasix. ID was consulted and recommended ampicillin for pyelo although clinically pt has no sxs but has pyuria which may be the cause of her fevers.    # Cough, retained secretions, likely aspiration   - ng tube removed, f/u w/ S&S -> mech soft ground w/ nectar liquids  - Chest PT    # Asthma and/or chf per pulm  - IV Solumedrol  - IV lasix, echo  - Duonebs  - Symbicort    # H/o PE  - LE duplex to r/o dvt pending    # Recent Kyphoplasty  - NS-> no recs    # RA  - Takes pred 5 at home, will resume once off of IV solumedrol    # UTI ( H/o E. coli uti & bacteremia ) -> grew VRE ( is on contact isolation )  - Switched to Zyvox per ID    # Parkinsons  - c/w sinemet 77 year old pt with h/o asthma, Ra (on prednisone 5 mg daily) pia, pulmonary cement emboli after recent kyphoplasty ( last month by Dr. Garcia ), recent UTI ( discharged from s site 2 days ptp ), currently being worked up as an o/p for parkinson's disease admitted with complaints of shortness of breath, fever, lethargy, and has retained secretions on exam. Pulm was consulted who felt it was consistent w/ asthma and/or underlying chf and recommended IV Solumedrol, inhalers, and IV lasix. ID was consulted and recommended ampicillin for pyelo although clinically pt has no sxs but has pyuria which may be the cause of her fevers.    # Cough, retained secretions, likely aspiration   - ng tube removed, f/u w/ S&S -> mech soft ground w/ nectar liquids  - Chest PT    # Asthma and/or chf per pulm  -pred 40 x 5 days  - po lasix  - Duonebs  - Symbicort    # Recent Kyphoplasty  - NS-> no recs    # RA  - Takes pred 5 at home, will resume once off of IV solumedrol    # Fibromyalgia  - pain control ( morphine, fentanyl ) per Dr. Fulton outpatient    # UTI ( H/o E. coli uti & bacteremia ) -> grew VRE ( is on contact isolation )  - Switched to Zyvox per ID    # Parkinsons  - c/w sinemet  - spoke with patients neurologist Dr. Eduin Houser who confirmed pt is being worked up for parkinsons, was supposed to get an MRI and then a DaTscan.

## 2018-05-14 NOTE — PHYSICAL THERAPY INITIAL EVALUATION ADULT - ADDITIONAL COMMENTS
As per son Trey pt was amb with Rollator prior to kyphoplasty. S/p kyphoplasty and rehab used RW but didn't amb much.

## 2018-05-14 NOTE — PROGRESS NOTE ADULT - ASSESSMENT
A/P    Pt was discharged from [facility] 2 days ago after being treated for UTI  Last month she had Kyphoplasty at [place] by Dr Garcia  She has cough and retained secretions and had fever  feels weak     A/p    1-Fever, cough  on exam has retained secretions  Likely Aspiration  h/o Parkinsons disaese  Speech and swallow recommended to start Puree diet  Aspiration precautions  Chest PT  continue nebs/steroids,   taper steroids now      2-Possible CHF  switch to po lasix  daily I/O and BMP to follow electrolytes  Follow up echo    3- UTI/Pyelo urine culture previously  was positive for E coli  Repeat cultures shows VRE  seen by ID recommended to change Abx to Linezolid  Follow repeat cultures       4- Parkinsons disease    continue sinemet    5- H/O recent kyphoplasty  no back pain  d/c morphine, raboxin  NS eval    6-  h/o Psoriatic arthritis  Hold Methotraxate  continue steroids  restart Morphine    7- Hypothyroidism  on Levothyroxine    8- DVT/GI prophylaxis  s/c heparin/PPIs    PT/Rehab  D/c planning      Discussed with son at bedside in detail    Case discussed with Resident taking care of the Patient today  Resident Progress note reviewed

## 2018-05-15 ENCOUNTER — TRANSCRIPTION ENCOUNTER (OUTPATIENT)
Age: 78
End: 2018-05-15

## 2018-05-15 LAB
ANION GAP SERPL CALC-SCNC: 16 MMOL/L — HIGH (ref 7–14)
BASOPHILS # BLD AUTO: 0.04 K/UL — SIGNIFICANT CHANGE UP (ref 0–0.2)
BASOPHILS NFR BLD AUTO: 0.7 % — SIGNIFICANT CHANGE UP (ref 0–1)
BUN SERPL-MCNC: 27 MG/DL — HIGH (ref 10–20)
CALCIUM SERPL-MCNC: 6.6 MG/DL — LOW (ref 8.5–10.1)
CHLORIDE SERPL-SCNC: 90 MMOL/L — LOW (ref 98–110)
CO2 SERPL-SCNC: 32 MMOL/L — SIGNIFICANT CHANGE UP (ref 17–32)
CREAT SERPL-MCNC: 1.2 MG/DL — SIGNIFICANT CHANGE UP (ref 0.7–1.5)
EOSINOPHIL # BLD AUTO: 0 K/UL — SIGNIFICANT CHANGE UP (ref 0–0.7)
EOSINOPHIL NFR BLD AUTO: 0 % — SIGNIFICANT CHANGE UP (ref 0–8)
GLUCOSE SERPL-MCNC: 154 MG/DL — HIGH (ref 70–99)
HCT VFR BLD CALC: 31.3 % — LOW (ref 37–47)
HGB BLD-MCNC: 10.1 G/DL — LOW (ref 12–16)
IMM GRANULOCYTES NFR BLD AUTO: 9.6 % — HIGH (ref 0.1–0.3)
LYMPHOCYTES # BLD AUTO: 2.25 K/UL — SIGNIFICANT CHANGE UP (ref 1.2–3.4)
LYMPHOCYTES # BLD AUTO: 41.5 % — SIGNIFICANT CHANGE UP (ref 20.5–51.1)
MCHC RBC-ENTMCNC: 30.6 PG — SIGNIFICANT CHANGE UP (ref 27–31)
MCHC RBC-ENTMCNC: 32.3 G/DL — SIGNIFICANT CHANGE UP (ref 32–37)
MCV RBC AUTO: 94.8 FL — SIGNIFICANT CHANGE UP (ref 81–99)
MONOCYTES # BLD AUTO: 1.19 K/UL — HIGH (ref 0.1–0.6)
MONOCYTES NFR BLD AUTO: 22 % — HIGH (ref 1.7–9.3)
NEUTROPHILS # BLD AUTO: 1.42 K/UL — SIGNIFICANT CHANGE UP (ref 1.4–6.5)
NEUTROPHILS NFR BLD AUTO: 26.2 % — LOW (ref 42.2–75.2)
NRBC # BLD: 2 /100 WBCS — HIGH (ref 0–0)
PLATELET # BLD AUTO: 481 K/UL — HIGH (ref 130–400)
POTASSIUM SERPL-MCNC: 3.7 MMOL/L — SIGNIFICANT CHANGE UP (ref 3.5–5)
POTASSIUM SERPL-SCNC: 3.7 MMOL/L — SIGNIFICANT CHANGE UP (ref 3.5–5)
RBC # BLD: 3.3 M/UL — LOW (ref 4.2–5.4)
RBC # FLD: 17.4 % — HIGH (ref 11.5–14.5)
SODIUM SERPL-SCNC: 138 MMOL/L — SIGNIFICANT CHANGE UP (ref 135–146)
WBC # BLD: 5.42 K/UL — SIGNIFICANT CHANGE UP (ref 4.8–10.8)
WBC # FLD AUTO: 5.42 K/UL — SIGNIFICANT CHANGE UP (ref 4.8–10.8)

## 2018-05-15 RX ORDER — LINEZOLID 600 MG/300ML
1 INJECTION, SOLUTION INTRAVENOUS
Qty: 8 | Refills: 0
Start: 2018-05-15 | End: 2018-05-18

## 2018-05-15 RX ORDER — LOPERAMIDE HCL 2 MG
2 TABLET ORAL
Qty: 0 | Refills: 0 | Status: DISCONTINUED | OUTPATIENT
Start: 2018-05-15 | End: 2018-05-16

## 2018-05-15 RX ADMIN — Medication 75 MICROGRAM(S): at 05:27

## 2018-05-15 RX ADMIN — MORPHINE SULFATE 30 MILLIGRAM(S): 50 CAPSULE, EXTENDED RELEASE ORAL at 06:13

## 2018-05-15 RX ADMIN — BUDESONIDE AND FORMOTEROL FUMARATE DIHYDRATE 2 PUFF(S): 160; 4.5 AEROSOL RESPIRATORY (INHALATION) at 11:41

## 2018-05-15 RX ADMIN — Medication 40 MILLIGRAM(S): at 06:10

## 2018-05-15 RX ADMIN — CARBIDOPA AND LEVODOPA 1 TABLET(S): 25; 100 TABLET ORAL at 14:32

## 2018-05-15 RX ADMIN — CARBIDOPA AND LEVODOPA 1 TABLET(S): 25; 100 TABLET ORAL at 05:27

## 2018-05-15 RX ADMIN — MORPHINE SULFATE 30 MILLIGRAM(S): 50 CAPSULE, EXTENDED RELEASE ORAL at 17:49

## 2018-05-15 RX ADMIN — Medication 40 MILLIGRAM(S): at 05:27

## 2018-05-15 RX ADMIN — Medication 3 MILLILITER(S): at 19:58

## 2018-05-15 RX ADMIN — LINEZOLID 600 MILLIGRAM(S): 600 INJECTION, SOLUTION INTRAVENOUS at 06:10

## 2018-05-15 RX ADMIN — MORPHINE SULFATE 30 MILLIGRAM(S): 50 CAPSULE, EXTENDED RELEASE ORAL at 05:27

## 2018-05-15 RX ADMIN — MONTELUKAST 10 MILLIGRAM(S): 4 TABLET, CHEWABLE ORAL at 21:05

## 2018-05-15 RX ADMIN — Medication 3 MILLILITER(S): at 13:27

## 2018-05-15 RX ADMIN — Medication 1 TABLET(S): at 11:41

## 2018-05-15 RX ADMIN — ENOXAPARIN SODIUM 40 MILLIGRAM(S): 100 INJECTION SUBCUTANEOUS at 05:27

## 2018-05-15 RX ADMIN — BUDESONIDE AND FORMOTEROL FUMARATE DIHYDRATE 2 PUFF(S): 160; 4.5 AEROSOL RESPIRATORY (INHALATION) at 21:05

## 2018-05-15 RX ADMIN — LINEZOLID 600 MILLIGRAM(S): 600 INJECTION, SOLUTION INTRAVENOUS at 17:47

## 2018-05-15 RX ADMIN — Medication 3 MILLILITER(S): at 08:07

## 2018-05-15 RX ADMIN — CARBIDOPA AND LEVODOPA 1 TABLET(S): 25; 100 TABLET ORAL at 21:05

## 2018-05-15 RX ADMIN — Medication 1 MILLIGRAM(S): at 11:41

## 2018-05-15 RX ADMIN — MORPHINE SULFATE 30 MILLIGRAM(S): 50 CAPSULE, EXTENDED RELEASE ORAL at 18:42

## 2018-05-15 NOTE — DISCHARGE NOTE ADULT - MEDICATION SUMMARY - MEDICATIONS TO TAKE
I will START or STAY ON the medications listed below when I get home from the hospital:    predniSONE 5 mg oral delayed release tablet  -- 5 milligram(s) by mouth once a day  -- Indication: For RA    fentaNYL 12 mcg/hr transdermal film, extended release  -- 1 patch by transdermal patch every 72 hours  -- Indication: For Fibromyalgia    morphine 30 mg/12 hr oral tablet, extended release  -- 1 tab(s) by mouth 2 times a day  -- Indication: For Fibromyalgia    morphine 15 mg/12 hr oral tablet, extended release  -- 1 tab(s) by mouth every 12 hours, As needed, severe breakthrough pain  -- Indication: For Fibromyalgia    Savella 25 mg oral tablet  -- 1 tab(s) by mouth 2 times a day  -- Indication: For Antidepressants    methotrexate 10 mg oral tablet  -- orally once a week (q12h) on tuesdays  -- Indication: For RA    carbidopa-levodopa 10 mg-100 mg oral tablet  -- orally 3 times a day  -- Indication: For Parkinsons disease    bacitracin 500 units/g topical ointment  -- 1 application on skin 2 times a day  -- Indication: For Skin    furosemide 40 mg oral tablet  -- 1 tab(s) by mouth once a day  -- Indication: For CHF    Actemra 20 mg/mL intravenous solution  -- intravenous once a month, last infusion ~2 wks ago  -- Indication: For RA    montelukast 10 mg oral tablet  -- 1 tab(s) by mouth once a day  -- Indication: For Asthma    Zyvox 600 mg oral tablet  -- 1 tab(s) by mouth every 12 hours   -- Avoid chocolate, wine and cheese while taking this medication.  Finish all this medication unless otherwise directed by prescriber.  Obtain medical advice before taking any non-prescription drugs as some may affect the action of this medication.    -- Indication: For VRE     levothyroxine 75 mcg (0.075 mg) oral tablet  -- 1 tab(s) by mouth once a day  -- Indication: For Hypothyroidism

## 2018-05-15 NOTE — PROGRESS NOTE ADULT - ASSESSMENT
A/P    Pt was discharged from [facility] 2 days ago after being treated for UTI  Last month she had Kyphoplasty at [place] by Dr Garcia  She has cough and retained secretions and had fever  feels weak     A/p    1-Fever, cough  on exam has retained secretions  Likely Aspiration  h/o Parkinsons disaese  Speech and swallow recommended to start Puree diet  Aspiration precautions  Chest PT  continue nebs/steroids,   taper steroids   tolerating puree diet now      2-Possible CHF  switch to po lasix  daily I/O and BMP to follow electrolytes  echo reviewed    3- UTI/Pyelonephritis    urine culture previously  was positive for E coli  Repeat cultures shows VRE  seen by ID recommended to change Abx to Linezolid  on Abx , no more fever or dysuria now  get ultraound kidneys today      4- Parkinsons disease    continue sinemet    5- H/O recent kyphoplasty  no back pain  restarted on morphine for her fibromyalgia      6-  h/o Psoriatic arthritis  Hold Methotraxate  continue steroids  restart Morphine    7- Hypothyroidism  on Levothyroxine    8- Diarrhea : c diff negative  improving  can use immodium prn    8- DVT/GI prophylaxis  s/c heparin/PPIs    Today had long discussion with family daughter and son at bedside  All their concerns discussed  She will likely be discharge in am with home services as they dont want Rehab      Case discussed with Resident taking care of the Patient today  Resident Progress note reviewed

## 2018-05-15 NOTE — SWALLOW BEDSIDE ASSESSMENT ADULT - SWALLOW EVAL: FUNCTIONAL LEVEL AT TIME OF EVAL
audible rhonchi persists
pt reports she had just vomited ~30 min ago. pt states vomiting occurred shortly after breakfast however reports there were no food particles in vomit it was all yellowish mucus and only enough to fill a napkin.

## 2018-05-15 NOTE — DISCHARGE NOTE ADULT - CARE PLAN
Principal Discharge DX:	VRE (vancomycin-resistant Enterococci)  Goal:	Treat and resolve infection.  Assessment and plan of treatment:	VRE in the urine, take Zyvox as prescribed and follow up with your Primary care providers.

## 2018-05-15 NOTE — PROGRESS NOTE ADULT - ASSESSMENT
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 Garcia ), recent UTI ( discharged from s site 2 days ptp ), currently being worked up as an o/p for parkinson's disease admitted with complaints of shortness of breath, fever, lethargy, and has retained secretions on exam. Pulm was consulted who felt it was consistent w/ asthma and/or underlying chf and recommended IV Solumedrol, inhalers, and IV lasix. ID was consulted and recommended ampicillin for pyelo although clinically pt has no sxs but has pyuria which may be the cause of her fevers.    # Cough, retained secretions, likely aspiration   - ng tube removed, f/u w/ S&S -> mech soft ground w/ nectar liquids  - Chest PT    # Asthma and/or chf per pulm  -pred 40 x 5 days  - po lasix  - Duonebs  - Symbicort    # Recent Kyphoplasty  - NS-> no recs    # RA  - Takes pred 5 at home, will resume once off of IV solumedrol    # Fibromyalgia  - pain control ( morphine, fentanyl ) per Dr. Fulton outpatient    # UTI ( H/o E. coli uti & bacteremia ) -> grew VRE ( is on contact isolation )  - Switched to Zyvox per ID    # Parkinsons  - c/w sinemet  - spoke with patients neurologist Dr. Eduin Houser who confirmed pt is being worked up for parkinsons, was supposed to get an MRI and then a DaTscan.    # Dispo: Homecare services

## 2018-05-15 NOTE — DISCHARGE NOTE ADULT - PATIENT PORTAL LINK FT
You can access the [facility] Patient Portal, offered by [facility], by registering with the following website: [email or web address]
You were seen in the ED for belly pain. Your blood tests and CAT scan show no concerning abnormalities.     It is not entirely clear what is causing the pain, please see your regular doctor in 1-2 days to ensure improvement.     Return for worsening pain, pain with urination, repetitive vomiting or for any other concerns.     You may use Tylenol 650mg every 8 hours or Motrin 600mg every 8 hours as needed for pain. You may also use the Pepcid as needed for pain.

## 2018-05-15 NOTE — DISCHARGE NOTE ADULT - HOSPITAL COURSE
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 Garcia ), recent UTI ( discharged from s site 2 days ptp ), currently being worked up as an o/p for parkinson's disease admitted with complaints of shortness of breath, fever, lethargy, and has retained secretions on exam. Pulm was consulted who felt it was consistent w/ asthma and/or underlying chf and recommended IV Solumedrol, inhalers, and IV lasix. ID was consulted and recommended ampicillin for pyelo although clinically pt has no sxs but has pyuria which may be the cause of her fevers. She then had a positive culture in her urine for VRE and was started on Zyvox. She was also seen by speech and swallow who recommended a mechanical soft ground w/ nectar liquids diet. She was seen by Dr. Garcia who performed the kyphoplasty in the hospital as a courtesy. She was instructed to continue taking her prescribed meds for Fibromyalgia for pain control ( morphine, fentanyl ) per Dr. Fulton prescribed as an outpatient. She was also instructed to follow up with her neurologist Dr. Eduin Houser who confirmed pt is being worked up for parkinsons, was supposed to get an MRI and then a DaTscan.

## 2018-05-15 NOTE — PROGRESS NOTE ADULT - ASSESSMENT
IMPRESSION:  77 year old pt with h/o asthma and rheumatoid arthritis (on prednisone 5 mg daily) DWAYNE, s/p kyphoplasty with pulmonary cement emboli admitted with complaints of shortness of breath consistent with asthma exacerbation with clinical suspicion for pulmonary edema and concern for underlying CHF- diaslotic dysfxn      RECOMMENDATIONS:  ·	prednisone 40 mg for 5 days  ·	Continue Symbicort and albuterol  ·	avoid volume overload  ·	CPAP at night  ·	aspiration precautions  ·	GI and DVT prophylaxis'  ·	d/w family at bedside

## 2018-05-15 NOTE — DISCHARGE NOTE ADULT - INSTRUCTIONS
Mechanical soft cut up meats with Nectar Consistency Fluid.  Consecutive swallows, no straws, and fully upright position to prevent aspiration.

## 2018-05-15 NOTE — DISCHARGE NOTE ADULT - MEDICATION SUMMARY - MEDICATIONS TO STOP TAKING
I will STOP taking the medications listed below when I get home from the hospital:    methocarbamol 750 mg oral tablet  -- 1 tab(s) by mouth every 8 hours   -- May cause drowsiness.  Alcohol may intensify this effect.  Use care when operating dangerous machinery.    lidocaine 5% topical film  -- Apply on skin to affected area once a day -for moderate pain    oxyCODONE-acetaminophen 5 mg-325 mg oral tablet  -- 1 tab(s) by mouth every 4 hours, As needed, Severe Pain (7 - 10)    sulfamethoxazole-trimethoprim 800 mg-160 mg oral tablet  -- 1 tab(s) by mouth 2 times a day

## 2018-05-15 NOTE — DISCHARGE NOTE ADULT - CARE PROVIDER_API CALL
Jennifer Moncada (), Internal Medicine  [address] [place] [address]  Phone: [phone]  Fax: [phone]    Nadir Garcia (MD), Neurological Surgery  [address] [place] [address]  Phone: [phone]  Fax: [phone]    Mandy Marshall), Internal Medicine  [address] [place] [address]  Phone: [phone]  Fax: [phone]

## 2018-05-15 NOTE — DISCHARGE NOTE ADULT - PLAN OF CARE
Treat and resolve infection. VRE in the urine, take Zyvox as prescribed and follow up with your Primary care providers.

## 2018-05-16 VITALS
RESPIRATION RATE: 18 BRPM | HEART RATE: 86 BPM | SYSTOLIC BLOOD PRESSURE: 113 MMHG | TEMPERATURE: 97 F | DIASTOLIC BLOOD PRESSURE: 79 MMHG

## 2018-05-16 LAB
ANION GAP SERPL CALC-SCNC: 20 MMOL/L — HIGH (ref 7–14)
BASOPHILS # BLD AUTO: 0.05 K/UL — SIGNIFICANT CHANGE UP (ref 0–0.2)
BASOPHILS NFR BLD AUTO: 0.6 % — SIGNIFICANT CHANGE UP (ref 0–1)
BUN SERPL-MCNC: 35 MG/DL — HIGH (ref 10–20)
CALCIUM SERPL-MCNC: 7 MG/DL — LOW (ref 8.5–10.1)
CHLORIDE SERPL-SCNC: 86 MMOL/L — LOW (ref 98–110)
CO2 SERPL-SCNC: 30 MMOL/L — SIGNIFICANT CHANGE UP (ref 17–32)
CREAT SERPL-MCNC: 2.1 MG/DL — HIGH (ref 0.7–1.5)
CULTURE RESULTS: SIGNIFICANT CHANGE UP
CULTURE RESULTS: SIGNIFICANT CHANGE UP
EOSINOPHIL # BLD AUTO: 0.03 K/UL — SIGNIFICANT CHANGE UP (ref 0–0.7)
EOSINOPHIL NFR BLD AUTO: 0.4 % — SIGNIFICANT CHANGE UP (ref 0–8)
GLUCOSE SERPL-MCNC: 161 MG/DL — HIGH (ref 70–99)
HCT VFR BLD CALC: 35.6 % — LOW (ref 37–47)
HGB BLD-MCNC: 11.5 G/DL — LOW (ref 12–16)
IMM GRANULOCYTES NFR BLD AUTO: 13.1 % — HIGH (ref 0.1–0.3)
LYMPHOCYTES # BLD AUTO: 2.85 K/UL — SIGNIFICANT CHANGE UP (ref 1.2–3.4)
LYMPHOCYTES # BLD AUTO: 34.1 % — SIGNIFICANT CHANGE UP (ref 20.5–51.1)
MCHC RBC-ENTMCNC: 31.3 PG — HIGH (ref 27–31)
MCHC RBC-ENTMCNC: 32.3 G/DL — SIGNIFICANT CHANGE UP (ref 32–37)
MCV RBC AUTO: 96.7 FL — SIGNIFICANT CHANGE UP (ref 81–99)
MONOCYTES # BLD AUTO: 2.15 K/UL — HIGH (ref 0.1–0.6)
MONOCYTES NFR BLD AUTO: 25.7 % — HIGH (ref 1.7–9.3)
NEUTROPHILS # BLD AUTO: 2.18 K/UL — SIGNIFICANT CHANGE UP (ref 1.4–6.5)
NEUTROPHILS NFR BLD AUTO: 26.1 % — LOW (ref 42.2–75.2)
NRBC # BLD: 4 /100 WBCS — HIGH (ref 0–0)
PLATELET # BLD AUTO: 658 K/UL — HIGH (ref 130–400)
POTASSIUM SERPL-MCNC: 4.4 MMOL/L — SIGNIFICANT CHANGE UP (ref 3.5–5)
POTASSIUM SERPL-SCNC: 4.4 MMOL/L — SIGNIFICANT CHANGE UP (ref 3.5–5)
RBC # BLD: 3.68 M/UL — LOW (ref 4.2–5.4)
RBC # FLD: 18.3 % — HIGH (ref 11.5–14.5)
SODIUM SERPL-SCNC: 136 MMOL/L — SIGNIFICANT CHANGE UP (ref 135–146)
SPECIMEN SOURCE: SIGNIFICANT CHANGE UP
SPECIMEN SOURCE: SIGNIFICANT CHANGE UP
WBC # BLD: 8.35 K/UL — SIGNIFICANT CHANGE UP (ref 4.8–10.8)
WBC # FLD AUTO: 8.35 K/UL — SIGNIFICANT CHANGE UP (ref 4.8–10.8)

## 2018-05-16 RX ORDER — FUROSEMIDE 40 MG
1 TABLET ORAL
Qty: 40 | Refills: 0
Start: 2018-05-16 | End: 2018-06-24

## 2018-05-16 RX ADMIN — Medication 1 MILLIGRAM(S): at 11:43

## 2018-05-16 RX ADMIN — Medication 1 TABLET(S): at 11:42

## 2018-05-16 RX ADMIN — CARBIDOPA AND LEVODOPA 1 TABLET(S): 25; 100 TABLET ORAL at 05:24

## 2018-05-16 RX ADMIN — Medication 3 MILLILITER(S): at 07:44

## 2018-05-16 RX ADMIN — MORPHINE SULFATE 30 MILLIGRAM(S): 50 CAPSULE, EXTENDED RELEASE ORAL at 07:10

## 2018-05-16 RX ADMIN — Medication 40 MILLIGRAM(S): at 05:24

## 2018-05-16 RX ADMIN — Medication 3 MILLILITER(S): at 13:22

## 2018-05-16 RX ADMIN — Medication 75 MICROGRAM(S): at 05:24

## 2018-05-16 RX ADMIN — ENOXAPARIN SODIUM 40 MILLIGRAM(S): 100 INJECTION SUBCUTANEOUS at 05:24

## 2018-05-16 RX ADMIN — CARBIDOPA AND LEVODOPA 1 TABLET(S): 25; 100 TABLET ORAL at 13:26

## 2018-05-16 RX ADMIN — MORPHINE SULFATE 30 MILLIGRAM(S): 50 CAPSULE, EXTENDED RELEASE ORAL at 05:23

## 2018-05-16 RX ADMIN — LINEZOLID 600 MILLIGRAM(S): 600 INJECTION, SOLUTION INTRAVENOUS at 05:24

## 2018-05-16 RX ADMIN — BUDESONIDE AND FORMOTEROL FUMARATE DIHYDRATE 2 PUFF(S): 160; 4.5 AEROSOL RESPIRATORY (INHALATION) at 08:44

## 2018-05-16 NOTE — SWALLOW BEDSIDE ASSESSMENT ADULT - SWALLOW EVAL: RECOMMENDED FEEDING/EATING TECHNIQUES
consecutive swallows/small sips/bites/position upright (90 degrees)
allow for swallow between intakes/small sips/bites/no straws/position upright (90 degrees)

## 2018-05-16 NOTE — PROGRESS NOTE ADULT - ASSESSMENT
A/P    Pt was discharged from [facility] recently  after being treated for UTI  Last month she had Kyphoplasty at [facility] by Dr Garcia      A/p    1-Fever, cough  on exam has retained secretions  Likely Aspiration pneumonitis   h/o Parkinsons disaese  Speech and swallow recommended to start Puree diet  Aspiration precautions  Chest PT  continue nebs/steroids,   taper steroids   tolerating puree diet now      2-Possible CHF  Acute diastolic CHF  improved      3- UTI/Pyelonephritis    urine culture previously  was positive for E coli  Repeat cultures shows VRE  seen by ID recommended to change Abx to Linezolid  ultrasound kidneys unremarkable      4- Parkinsons disease    continue sinemet    5- H/O recent kyphoplasty  no back pain  restarted on morphine for her fibromyalgia      6-  h/o Psoriatic arthritis  Hold Methotraxate  continue steroids      7- Hypothyroidism  on Levothyroxine    8- Diarrhea : c diff negative  improving  can use immodium prn    8- DVT/GI prophylaxis  s/c heparin/PPIs    9- Arthur  creatinine went up today  likely sec to diuresis AND DIARRHEA  D/C LASIX  Repeat labs in a week with PMD    Discussed with daughter   Med rec reviewed with daughter in detail  Lasix is written on med rec but told daughter not to give lasix at home    case also discussed with PMD in the community    Case discussed with Resident taking care of the Patient today  Resident discharge Progress note reviewed

## 2018-05-16 NOTE — SWALLOW BEDSIDE ASSESSMENT ADULT - SWALLOW EVAL: CURRENT DIET
regular with thins
NPO
mechanical soft ground meat w nectar thick liquids
mechanical soft ground meat w nectar thick liquids

## 2018-05-16 NOTE — PROGRESS NOTE ADULT - PROVIDER SPECIALTY LIST ADULT
Infectious Disease
Internal Medicine
Pulmonology
Internal Medicine

## 2018-05-16 NOTE — PROGRESS NOTE ADULT - ASSESSMENT
IMPRESSION:  77 year old pt with h/o asthma and rheumatoid arthritis (on prednisone 5 mg daily) DWAYNE, s/p kyphoplasty with pulmonary cement emboli admitted with complaints of shortness of breath consistent with asthma exacerbation with clinical suspicion for pulmonary edema and concern for underlying CHF- diaslotic dysfxn      RECOMMENDATIONS:  ·	prednisone 40 mg with taper to baseline 5 mg daily  ·	Continue Symbicort and albuterol  ·	avoid volume overload  ·	CPAP at night  ·	aspiration precautions  ·	stable from pulmonary standpoint

## 2018-05-16 NOTE — SWALLOW BEDSIDE ASSESSMENT ADULT - SWALLOW EVAL: RECOMMENDED DIET
npo except meds and small sips of water only when seated fully upright
MS grd w/ nectar thick liquids
NPO, non-oral means of nutrition and hydration, pt amenable to trial NGT
mechanical soft ground meat with nectar thick liquids

## 2018-05-16 NOTE — SWALLOW BEDSIDE ASSESSMENT ADULT - SLP GENERAL OBSERVATIONS
pt awake generally alert mild confusion noted. audible chest congestion note dupon entering room. wheezing wet/crackle breath sounds upon when cued to cough no production of phlegm and cough sounds clear.
Pt received in bed w/ spouse and daughter at b/s
Pt received in bed on O2 nasal cannula w/ daughter and son at b/s.
pt asleep however arousable. pt in no apparent pain. audible congestion noted with more productive cough.

## 2018-05-16 NOTE — PROGRESS NOTE ADULT - NSHPATTENDINGPLANDISCUSS_GEN_ALL_CORE
House staff, Patient and Family
House staff, Pt and her family
House staff, Nurse, patient and Family
House staff, family
House staff, patient , Physical therapy, Pt's Son

## 2018-05-16 NOTE — SWALLOW BEDSIDE ASSESSMENT ADULT - SLP PERTINENT HISTORY OF CURRENT PROBLEM
pt admitted with SOB and fever. pt had recent kyphoplasty here at [facility] in April and was d/c to SNF for rehab while there pt aspirated a tylenol pill and reportedly as per malu started vomiting and aspiration vomit. pt was treated at [facility] for aspiration PNA. SLP was never consulted. as per daughter pt has no history of dysphagia. pt seen by pulmonary and is being treated for possible asthma exacerbation
multiple comorbidities presents with cc of sob since 1 day ptp found to have fever of 102, recent admissions for - ecoli uti/ ecoli bacteremia/ cement pulmonary emboli s/p kyphoplasty . pt being treated for UTI and possible aspiration. pt also with ?parkinsons disease
pt admitted with SOB and fever. pt had recent kyphoplasty here at [facility] in April and was d/c to SNF for rehab while there pt aspirated a tylenol pill and reportedly as per malu started vomiting and aspiration vomit. pt was treated at [facility] for aspiration PNA. SLP was never consulted. as per daughter pt has no history of dysphagia. pt seen by pulmonary and is being treated for possible asthma exacerbation
multiple comorbidities presents with cc of sob since 1 day ptp found to have fever of 102, recent admissions for - ecoli uti/ ecoli bacteremia/ cement pulmonary emboli s/p kyphoplasty . pt being treated for UTI and possible aspiration. pt also with ?parkinsons disease

## 2018-05-16 NOTE — PROGRESS NOTE ADULT - SUBJECTIVE AND OBJECTIVE BOX
INTERVAL HPI/OVERNIGHT EVENTS:    77 yof with multiple comorbidities presents with cc of sob since 1 day ptp found to have fever of 102  - sob started graduallly 1 day ptp; progressively worsening, worsens with exertion; not assoc with cough  - pt has multiple recent admissions for - ecoli uti/ ecoli bacteremia/ cement pulmonary emboli s/p kyphoplasty  - pt lives at home with ; dependent on adls; uses walker for ambulation   - weakness, difficulty clearing secretions  - no nausea, vomiting, no back pain  She was admitted for UTI and possible Aspiration    Pt seen and examined today for UTI/Pyelo/ Aspiration  miesha diarrhea yesterday  today one episode of vomiting  more awake and  feels better than yesterday   no cough  started on puree diet by Speech and swallow      REVIEW OF SYSTEMS:  CONSTITUTIONAL: No fever, weight loss, or fatigue  EYES: No eye pain, visual disturbances, or discharge  ENMT:  No difficulty hearing, tinnitus, vertigo; No sinus or throat pain  NECK: No pain or stiffness  BREASTS: No pain, masses, or nipple discharge  RESPIRATORY: No cough, wheezing, chills or hemoptysis; No shortness of breath  CARDIOVASCULAR: No chest pain, palpitations, dizziness, or leg swelling  GASTROINTESTINAL: diarrhea, vomiting  GENITOURINARY: No dysuria, frequency, hematuria, or incontinence  NEUROLOGICAL: No headaches, memory loss, loss of strength, numbness, or tremors  SKIN: No itching, burning, rashes, or lesions   LYMPH NODES: No enlarged glands  ENDOCRINE: No heat or cold intolerance; No hair loss  MUSCULOSKELETAL: No joint pain or swelling; No muscle, back, or extremity pain  PSYCHIATRIC: No depression, anxiety, mood swings, or difficulty sleeping  HEME/LYMPH: No easy bruising, or bleeding gums  ALLERY AND IMMUNOLOGIC: No hives or eczema    VITALS:  T(F): 96.1, Max: 97.2 (05-15-18 @ 05:14)  HR: 80  BP: 138/60  RR: 17  SpO2: --    PHYSICAL EXAM:  GENERAL: NAD,   HEAD:  Atraumatic, Normocephalic  EYES: EOMI, PERRLA, conjunctiva and sclera clear  ENMT: No tonsillar erythema, exudates, or enlargement; Moist mucous membranes, Good dentition, No lesions  NECK: Supple, No JVD, Normal thyroid  NERVOUS SYSTEM:  Alert & Oriented X3, Good concentration; Motor Strength 5/5 B/L upper and lower extremities; DTRs 2+ intact and symmetric  CHEST/LUNG: mild rhonchi  HEART: Regular rate and rhythm; No murmurs, rubs, or gallops  ABDOMEN: Soft, Nontender, Nondistended; Bowel sounds present  EXTREMITIES: no edema  LYMPH: No lymphadenopathy noted  SKIN: No rashes or lesions      LABS:    05-15    138  |  90<L>  |  27<H>  ----------------------------<  154<H>  3.7   |  32  |  1.2    Ca    6.6<L>      15 May 2018 08:12                            10.1   5.42  )-----------( 481      ( 15 May 2018 08:12 )             31.3           RADIOLOGY & ADDITIONAL TESTS:    Imaging Personally Reviewed:  [ ] YES  [ ] NO    MEDICATIONS:     MEDICATIONS  (STANDING):  ALBUTerol/ipratropium for Nebulization 3 milliLiter(s) Nebulizer every 6 hours  buDESOnide  80 MICROgram(s)/formoterol 4.5 MICROgram(s) Inhaler 2 Puff(s) Inhalation two times a day  calcium carbonate 1250 mG + Vitamin D (OsCal 500 + D) 1 Tablet(s) Oral daily  carbidopa/levodopa  10/100 1 Tablet(s) Oral three times a day  enoxaparin Injectable 40 milliGRAM(s) SubCutaneous every 24 hours  fentaNYL   Patch  12 MICROgram(s)/Hr 1 Patch Transdermal every 72 hours  folic acid 1 milliGRAM(s) Oral daily  furosemide    Tablet 40 milliGRAM(s) Oral daily  levothyroxine 75 MICROGram(s) Oral daily  linezolid    Tablet 600 milliGRAM(s) Oral every 12 hours  montelukast 10 milliGRAM(s) Oral at bedtime  morphine ER Tablet 30 milliGRAM(s) Oral two times a day  predniSONE   Tablet 40 milliGRAM(s) Oral daily    MEDICATIONS  (PRN):  acetaminophen   Tablet 650 milliGRAM(s) Oral every 6 hours PRN For Temp greater than 38 C (100.4 F)  loperamide 2 milliGRAM(s) Oral every 3 hours PRN Diarrhea  morphine ER Tablet 15 milliGRAM(s) Oral two times a day PRN severe pain
INTERVAL HPI/OVERNIGHT EVENTS:  77 yof with multiple comorbidities presents with cc of sob since 1 day ptp found to have fever of 102  - sob started graduallly 1 day ptp; progressively worsening, worsens with exertion; not assoc with cough  - pt has multiple recent admissions for - ecoli uti/ ecoli bacteremia/ cement pulmonary emboli s/p kyphoplasty  - pt lives at home with ; dependent on adls; uses walker for ambulation   - weakness, difficulty clearing secretions  - no nausea, vomiting, no back pain  She was admitted for UTI and possible Aspiration    Today Pt seen and examined for follow up of UTI/ ? Aspiration  feels better than  yesterday  Still NPO, has cough,   no fever     REVIEW OF SYSTEMS:  CONSTITUTIONAL: No fever, weight loss, or fatigue  EYES: No eye pain, visual disturbances, or discharge  ENMT:  No difficulty hearing, tinnitus, vertigo; No sinus or throat pain  NECK: No pain or stiffness  BREASTS: No pain, masses, or nipple discharge  RESPIRATORY: cough  CARDIOVASCULAR: No chest pain, palpitations, dizziness, or leg swelling  GASTROINTESTINAL: No abdominal or epigastric pain. No nausea, vomiting, or hematemesis;   GENITOURINARY: No dysuria, frequency, hematuria, or incontinence  NEUROLOGICAL: No headaches,   SKIN: No itching, burning, rashes, or lesions   LYMPH NODES: No enlarged glands  ENDOCRINE: No heat or cold intolerance; No hair loss  MUSCULOSKELETAL: No joint pain or swelling; No muscle, back, or extremity pain  PSYCHIATRIC: No depression, anxiety, mood swings, or difficulty sleeping  HEME/LYMPH: No easy bruising, or bleeding gums  ALLERY AND IMMUNOLOGIC: No hives or eczema    VITALS:  T(F): 98.1, Max: 101.2 (18 @ 20:11)  HR: 91  BP: 150/69  RR: 20  SpO2: 98%    PHYSICAL EXAM:  GENERAL: NAD,  HEAD:  Atraumatic, Normocephalic  EYES: EOMI, PERRLA, conjunctiva and sclera clear  ENMT: No tonsillar erythema, exudates, or enlargement; Moist mucous membranes, Good dentition, No lesions  NECK: Supple, No JVD, Normal thyroid  NERVOUS SYSTEM:  Alert & Oriented X3, Good concentration;   CHEST/LUNG: Rhonchi  HEART: Regular rate and rhythm; No murmurs, rubs, or gallops  ABDOMEN: Soft, Nontender, Nondistended; Bowel sounds present  EXTREMITIES:  trace edema  LYMPH: No lymphadenopathy noted  SKIN: No rashes or lesions      LABS:  Urinalysis Basic - ( 10 May 2018 18:35 )    Color: Yellow / Appearance: Clear / S.015 / pH: x  Gluc: x / Ketone: 15  / Bili: Negative / Urobili: 1.0 mg/dL   Blood: x / Protein: 30 mg/dL / Nitrite: Negative   Leuk Esterase: Trace / RBC: x / WBC 10-25 /HPF   Sq Epi: x / Non Sq Epi: Moderate /HPF / Bacteria: Moderate /HPF      05-12    134<L>  |  87<L>  |  13  ----------------------------<  144<H>  3.7   |  29  |  0.8    Ca    6.5<L>      12 May 2018 07:09  Mg     1.6     11    TPro  6.8  /  Alb  3.5  /  TBili  0.7  /  DBili  <0.2  /  AST  68<H>  /  ALT  10  /  AlkPhos  94  11                          11.6   2.07  )-----------( 270      ( 12 May 2018 07:09 )             35.8       Culture - Blood (collected 10 May 2018 23:32)  Source: .Blood None  Preliminary Report (12 May 2018 06:01):    No growth to date.    Culture - Urine (collected 10 May 2018 18:35)  Source: .Urine Clean Catch (Midstream)  Preliminary Report (11 May 2018 21:10):    >100,000 CFU/ml Enterococcus faecalis    50,000 - 99,000 CFU/mL Enterococcus faecium    Culture - Blood (collected 10 May 2018 16:13)  Source: .Blood Blood  Preliminary Report (12 May 2018 01:02):    No growth to date.          RADIOLOGY & ADDITIONAL TESTS:    Imaging Personally Reviewed:  [ ] YES  [ ] NO    MEDICATIONS:     MEDICATIONS  (STANDING):  ALBUTerol/ipratropium for Nebulization 3 milliLiter(s) Nebulizer every 6 hours  ampicillin  IVPB 1 Gram(s) IV Intermittent every 6 hours  ampicillin  IVPB      buDESOnide  80 MICROgram(s)/formoterol 4.5 MICROgram(s) Inhaler 2 Puff(s) Inhalation two times a day  calcium carbonate 1250 mG + Vitamin D (OsCal 500 + D) 1 Tablet(s) Oral daily  carbidopa/levodopa  10/100 1 Tablet(s) Oral three times a day  enoxaparin Injectable 40 milliGRAM(s) SubCutaneous every 24 hours  folic acid 1 milliGRAM(s) Oral daily  furosemide   Injectable 40 milliGRAM(s) IV Push daily  levothyroxine 75 MICROGram(s) Oral daily  methylPREDNISolone sodium succinate Injectable 60 milliGRAM(s) IV Push two times a day  montelukast 10 milliGRAM(s) Oral at bedtime    MEDICATIONS  (PRN):  acetaminophen   Tablet 650 milliGRAM(s) Oral every 6 hours PRN For Temp greater than 38 C (100.4 F)
SUBJECTIVE:    Patient is a 77y old Female who presents with a chief complaint of sob & fever (10 May 2018 21:42)    Today is hospital day 1d. This morning she is resting comfortably in bed and reports no new issues or overnight events.     PAST MEDICAL & SURGICAL HISTORY  Pulmonary embolism  Seasonal allergies  DWAYNE on CPAP  Obesity: s/p gastric bypass ~15 yrs ago ~100 lb loss  Osteoarthritis  Rheumatoid arthritis  Fibromyalgia  Tremors of nervous system: essential tremors  Psoriatic arthritis  GERD (gastroesophageal reflux disease)  Hypothyroidism: no recent dosage changes  Ankle deformity: s/p b/l surgical repair  H/O gastric bypass    SOCIAL HISTORY:  Negative for smoking/alcohol/drug use.     ALLERGIES:  adhesives (Pruritus; Rash)  Betadine (Flushing (Skin); Pruritus)  NSAIDs (Vomiting; Rash; Nausea)    MEDICATIONS:  STANDING MEDICATIONS  ALBUTerol/ipratropium for Nebulization 3 milliLiter(s) Nebulizer every 6 hours  buDESOnide  80 MICROgram(s)/formoterol 4.5 MICROgram(s) Inhaler 2 Puff(s) Inhalation two times a day  calcium carbonate 1250 mG + Vitamin D (OsCal 500 + D) 1 Tablet(s) Oral daily  carbidopa/levodopa  10/100 1 Tablet(s) Oral three times a day  cefepime   IVPB 1000 milliGRAM(s) IV Intermittent every 12 hours  enoxaparin Injectable 40 milliGRAM(s) SubCutaneous every 24 hours  fentaNYL   Patch  12 MICROgram(s)/Hr 1 Patch Transdermal every 72 hours  folic acid 1 milliGRAM(s) Oral daily  furosemide   Injectable 40 milliGRAM(s) IV Push daily  furosemide   Injectable 40 milliGRAM(s) IV Push once  levothyroxine 75 MICROGram(s) Oral daily  methocarbamol 750 milliGRAM(s) Oral three times a day  methylPREDNISolone sodium succinate Injectable 60 milliGRAM(s) IV Push two times a day  montelukast 10 milliGRAM(s) Oral at bedtime  morphine ER Tablet 30 milliGRAM(s) Oral two times a day    PRN MEDICATIONS  acetaminophen   Tablet 650 milliGRAM(s) Oral every 6 hours PRN  morphine ER Tablet 15 milliGRAM(s) Oral two times a day PRN    VITALS:   T(F): 99  HR: 113  BP: 134/97  RR: 20  SpO2: 98%    LABS:                        11.3   2.80  )-----------( 203      ( 11 May 2018 08:32 )             35.2     05-10    134<L>  |  92<L>  |  7<L>  ----------------------------<  69<L>  5.3<H>   |  25  |  0.8    Ca    7.1<L>      10 May 2018 17:43    TPro  6.4  /  Alb  3.2<L>  /  TBili  0.6  /  DBili  x   /  AST  69<H>  /  ALT  23  /  AlkPhos  86  05-10    PT/INR - ( 10 May 2018 17:43 )   PT: T.N.P. sec;   INR: T.N.P. ratio         PTT - ( 10 May 2018 17:43 )  PTT:T.N.P. sec  Urinalysis Basic - ( 10 May 2018 18:35 )    Color: Yellow / Appearance: Clear / S.015 / pH: x  Gluc: x / Ketone: 15  / Bili: Negative / Urobili: 1.0 mg/dL   Blood: x / Protein: 30 mg/dL / Nitrite: Negative   Leuk Esterase: Trace / RBC: x / WBC 10-25 /HPF   Sq Epi: x / Non Sq Epi: Moderate /HPF / Bacteria: Moderate /HPF        Troponin T, Serum: <0.01 ng/mL (05-10-18 @ 17:43)  Creatine Kinase, Serum: 139 U/L (05-10-18 @ 17:43)      CARDIAC MARKERS ( 10 May 2018 17:43 )  x     / <0.01 ng/mL / 139 U/L / x     / 1.4 ng/mL      RADIOLOGY:    PHYSICAL EXAM:  GEN: No acute distress  LUNGS: Clear to auscultation bilaterally   HEART: S1/S2 present. RRR.   ABD: Soft, non-tender, non-distended. Bowel sounds present  EXT: NC/NC/NE/KHALIL  NEURO: AAOX3
HALINA YONATAN  77y, Female      OVERNIGHT EVENTS:    NG tube in place, no gaspar catheter, has SOB  no abdominal pain.    VITALS:  T(F): 97.5, Max: 98.1 (05-12-18 @ 12:49)  HR: 97  BP: 147/63  RR: 20Vital Signs Last 24 Hrs  T(C): 36.4 (13 May 2018 05:04), Max: 36.7 (12 May 2018 12:49)  T(F): 97.5 (13 May 2018 05:04), Max: 98.1 (12 May 2018 12:49)  HR: 97 (13 May 2018 05:04) (85 - 106)  BP: 147/63 (13 May 2018 05:04) (145/63 - 150/69)  BP(mean): --  RR: 20 (13 May 2018 05:04) (20 - 20)  SpO2: 98% (13 May 2018 00:00) (90% - 98%)    TESTS & MEASUREMENTS:                        11.6   2.07  )-----------( 270      ( 12 May 2018 07:09 )             35.8     05-12    134<L>  |  87<L>  |  13  ----------------------------<  144<H>  3.7   |  29  |  0.8    Ca    6.5<L>      12 May 2018 07:09  Mg     1.6     05-11    TPro  6.8  /  Alb  3.5  /  TBili  0.7  /  DBili  <0.2  /  AST  68<H>  /  ALT  10  /  AlkPhos  94  05-11    LIVER FUNCTIONS - ( 11 May 2018 08:32 )  Alb: 3.5 g/dL / Pro: 6.8 g/dL / ALK PHOS: 94 U/L / ALT: 10 U/L / AST: 68 U/L / GGT: x             Culture - Blood (collected 05-11-18 @ 19:00)  Source: .Blood None  Preliminary Report (05-13-18 @ 03:01):    No growth to date.    Culture - Blood (collected 05-10-18 @ 23:32)  Source: .Blood None  Preliminary Report (05-12-18 @ 06:01):    No growth to date.    Culture - Urine (collected 05-10-18 @ 18:35)  Source: .Urine Clean Catch (Midstream)  Final Report (05-12-18 @ 22:27):    >100,000 CFU/ml Enterococcus faecalis    50,000 - 99,000 CFU/mL Enterococcus faecium (vancomycin resistant)  Organism: Enterococcus faecalis  Enterococcus faecium (vancomycin resistant) (05-12-18 @ 22:27)  Organism: Enterococcus faecium (vancomycin resistant) (05-12-18 @ 22:27)      -  Ampicillin: R >8      -  Ciprofloxacin: R >2      -  Nitrofurantoin: I 64      -  Tetra/Doxy: R >8      -  Vancomycin: R >16      Method Type: MARC  Organism: Enterococcus faecalis (05-12-18 @ 22:27)      -  Ampicillin: S <=2      -  Ciprofloxacin: R >2      -  Nitrofurantoin: S <=32      -  Tetra/Doxy: R >8      -  Vancomycin: S 2      Method Type: MARC    Culture - Blood (collected 05-10-18 @ 16:13)  Source: .Blood Blood  Preliminary Report (05-12-18 @ 01:02):    No growth to date.            RADIOLOGY & ADDITIONAL TESTS:    ANTIBIOTICS:  ampicillin  IVPB 1 Gram(s) IV Intermittent every 6 hours  ampicillin  IVPB
INTERVAL HPI/OVERNIGHT EVENTS:    77 yof with multiple comorbidities presents with cc of sob since 1 day ptp found to have fever of 102  - sob started graduallly 1 day ptp; progressively worsening, worsens with exertion; not assoc with cough  - pt has multiple recent admissions for - ecoli uti/ ecoli bacteremia/ cement pulmonary emboli s/p kyphoplasty  - pt lives at home with ; dependent on adls; uses walker for ambulation   - weakness, difficulty clearing secretions  - no nausea, vomiting, no back pain  She was admitted for UTI and possible Aspiration    Pt seen and examined today for UTI/Pyelo/ Aspiration  cough better  started on puree diet by Speech and swallow      REVIEW OF SYSTEMS:  CONSTITUTIONAL: No fever, weight loss, or fatigue  EYES: No eye pain, visual disturbances, or discharge  ENMT:  No difficulty hearing, tinnitus, vertigo; No sinus or throat pain  NECK: No pain or stiffness  BREASTS: No pain, masses, or nipple discharge  RESPIRATORY: cough  CARDIOVASCULAR: No chest pain, palpitations, dizziness, or leg swelling  GASTROINTESTINAL: No abdominal or epigastric pain. No nausea, vomiting, or hematemesis;  GENITOURINARY: No dysuria, frequency, hematuria, or incontinence  NEUROLOGICAL: No headaches, memory loss, loss of strength, numbness, or tremors  SKIN: No itching, burning, rashes, or lesions   LYMPH NODES: No enlarged glands  ENDOCRINE: No heat or cold intolerance; No hair loss  MUSCULOSKELETAL: No joint pain or swelling; No muscle, back, or extremity pain  PSYCHIATRIC: No depression, anxiety, mood swings, or difficulty sleeping  HEME/LYMPH: No easy bruising, or bleeding gums  ALLERY AND IMMUNOLOGIC: No hives or eczema    VITALS:  T(F): 97.6, Max: 97.6 (05-14-18 @ 13:28)  HR: 93  BP: 117/88  RR: 16  SpO2: 93%    PHYSICAL EXAM:  GENERAL: NAD, well-groomed, well-developed  HEAD:  Atraumatic, Normocephalic  EYES: EOMI, PERRLA, conjunctiva and sclera clear  ENMT: No tonsillar erythema, exudates, or enlargement; Moist mucous membranes, Good dentition, No lesions  NECK: Supple, No JVD, Normal thyroid  NERVOUS SYSTEM:  Alert & Oriented X2  CHEST/LUNG: rhonchi  HEART: Regular rate and rhythm; No murmurs, rubs, or gallops  ABDOMEN: Soft, Nontender, Nondistended; Bowel sounds present  EXTREMITIES:  no edema  LYMPH: No lymphadenopathy noted  SKIN: No rashes or lesions      LABS:    05-14    138  |  89<L>  |  27<H>  ----------------------------<  276<H>  3.7   |  30  |  1.0    Ca    6.0<L>      14 May 2018 10:01                            10.3   3.98  )-----------( 462      ( 14 May 2018 10:01 )             32.0       Culture - Blood (collected 12 May 2018 07:09)  Source: .Blood None  Preliminary Report (13 May 2018 16:01):    No growth to date.    Culture - Blood (collected 12 May 2018 07:09)  Source: .Blood None  Preliminary Report (13 May 2018 16:01):    No growth to date.    Culture - Blood (collected 11 May 2018 19:00)  Source: .Blood None  Preliminary Report (13 May 2018 03:01):    No growth to date.          RADIOLOGY & ADDITIONAL TESTS:    Imaging Personally Reviewed:  [ ] YES  [ ] NO    MEDICATIONS:     MEDICATIONS  (STANDING):  ALBUTerol/ipratropium for Nebulization 3 milliLiter(s) Nebulizer every 6 hours  buDESOnide  80 MICROgram(s)/formoterol 4.5 MICROgram(s) Inhaler 2 Puff(s) Inhalation two times a day  calcium carbonate 1250 mG + Vitamin D (OsCal 500 + D) 1 Tablet(s) Oral daily  carbidopa/levodopa  10/100 1 Tablet(s) Oral three times a day  enoxaparin Injectable 40 milliGRAM(s) SubCutaneous every 24 hours  fentaNYL   Patch  12 MICROgram(s)/Hr 1 Patch Transdermal every 72 hours  folic acid 1 milliGRAM(s) Oral daily  furosemide    Tablet 40 milliGRAM(s) Oral daily  levothyroxine 75 MICROGram(s) Oral daily  linezolid    Tablet 600 milliGRAM(s) Oral every 12 hours  montelukast 10 milliGRAM(s) Oral at bedtime  morphine ER Tablet 30 milliGRAM(s) Oral two times a day  predniSONE   Tablet 40 milliGRAM(s) Oral daily    MEDICATIONS  (PRN):  acetaminophen   Tablet 650 milliGRAM(s) Oral every 6 hours PRN For Temp greater than 38 C (100.4 F)  morphine ER Tablet 15 milliGRAM(s) Oral two times a day PRN severe pain
INTERVAL HPI/OVERNIGHT EVENTS:    77 yof with multiple comorbidities presents with cc of sob since 1 day ptp found to have fever of 102  - sob started graduallly 1 day ptp; progressively worsening, worsens with exertion; not assoc with cough  - pt has multiple recent admissions for - ecoli uti/ ecoli bacteremia/ cement pulmonary emboli s/p kyphoplasty  - pt lives at home with ; dependent on adls; uses walker for ambulation   - weakness, difficulty clearing secretions  - no nausea, vomiting, no back pain  She was admitted for UTI and possible Aspiration    Pt seen and examined today for UTI/Pyelo/ Aspiration  feels better  no vomiting or diarrhea  wants to go home    REVIEW OF SYSTEMS:  CONSTITUTIONAL: No fever, weight loss, or fatigue  EYES: No eye pain, visual disturbances, or discharge  ENMT:  No difficulty hearing, tinnitus, vertigo; No sinus or throat pain  NECK: No pain or stiffness  BREASTS: No pain, masses, or nipple discharge  RESPIRATORY: No cough, wheezing, chills or hemoptysis; No shortness of breath  CARDIOVASCULAR: No chest pain, palpitations, dizziness, or leg swelling  GASTROINTESTINAL: No abdominal or epigastric pain. No nausea, vomiting, or hematemesis; No diarrhea or constipation. No melena or hematochezia.  GENITOURINARY: No dysuria, frequency, hematuria, or incontinence  NEUROLOGICAL: No headaches, memory loss, loss of strength, numbness, or tremors  SKIN: No itching, burning, rashes, or lesions   LYMPH NODES: No enlarged glands  ENDOCRINE: No heat or cold intolerance; No hair loss  MUSCULOSKELETAL: No joint pain or swelling; No muscle, back, or extremity pain  PSYCHIATRIC: No depression, anxiety, mood swings, or difficulty sleeping  HEME/LYMPH: No easy bruising, or bleeding gums  ALLERY AND IMMUNOLOGIC: No hives or eczema    VITALS:  T(F): 97, Max: 97 (05-15-18 @ 20:08)  HR: 86  BP: 113/79  RR: 18  SpO2: --    PHYSICAL EXAM:  GENERAL: NAD,  HEAD:  Atraumatic, Normocephalic  EYES: EOMI, PERRLA, conjunctiva and sclera clear  ENMT: No tonsillar erythema, exudates, or enlargement; Moist mucous membranes, Good dentition, No lesions  NECK: Supple, No JVD, Normal thyroid  NERVOUS SYSTEM:  Alert & Oriented X3, Good concentration; Motor Strength 5/5 B/L upper and lower extremities; DTRs 2+ intact and symmetric  CHEST/LUNG: Clear to percussion bilaterally; No rales, rhonchi, wheezing, or rubs  HEART: Regular rate and rhythm; No murmurs, rubs, or gallops  ABDOMEN: Soft, Nontender, Nondistended; Bowel sounds present  EXTREMITIES:  2+ Peripheral Pulses, No clubbing, cyanosis, or edema  LYMPH: No lymphadenopathy noted  SKIN: No rashes or lesions      LABS:    05-16    136  |  86<L>  |  35<H>  ----------------------------<  161<H>  4.4   |  30  |  2.1<H>    Ca    7.0<L>      16 May 2018 08:08                            11.5   8.35  )-----------( 658      ( 16 May 2018 08:08 )             35.6           RADIOLOGY & ADDITIONAL TESTS:    Imaging Personally Reviewed:  [ ] YES  [ ] NO    MEDICATIONS:     MEDICATIONS  (STANDING):  ALBUTerol/ipratropium for Nebulization 3 milliLiter(s) Nebulizer every 6 hours  buDESOnide  80 MICROgram(s)/formoterol 4.5 MICROgram(s) Inhaler 2 Puff(s) Inhalation two times a day  calcium carbonate 1250 mG + Vitamin D (OsCal 500 + D) 1 Tablet(s) Oral daily  carbidopa/levodopa  10/100 1 Tablet(s) Oral three times a day  enoxaparin Injectable 40 milliGRAM(s) SubCutaneous every 24 hours  fentaNYL   Patch  12 MICROgram(s)/Hr 1 Patch Transdermal every 72 hours  folic acid 1 milliGRAM(s) Oral daily  furosemide    Tablet 40 milliGRAM(s) Oral daily  levothyroxine 75 MICROGram(s) Oral daily  linezolid    Tablet 600 milliGRAM(s) Oral every 12 hours  montelukast 10 milliGRAM(s) Oral at bedtime  morphine ER Tablet 30 milliGRAM(s) Oral two times a day  predniSONE   Tablet 40 milliGRAM(s) Oral daily    MEDICATIONS  (PRN):  acetaminophen   Tablet 650 milliGRAM(s) Oral every 6 hours PRN For Temp greater than 38 C (100.4 F)  loperamide 2 milliGRAM(s) Oral every 3 hours PRN Diarrhea  morphine ER Tablet 15 milliGRAM(s) Oral two times a day PRN severe pain
INTERVAL HPI/OVERNIGHT EVENTS:    77 yof with multiple comorbidities presents with cc of sob since 1 day ptp found to have fever of 102  - sob started graduallly 1 day ptp; progressively worsening, worsens with exertion; not assoc with cough  - pt has multiple recent admissions for - ecoli uti/ ecoli bacteremia/ cement pulmonary emboli s/p kyphoplasty  - pt lives at home with ; dependent on adls; uses walker for ambulation   - weakness, difficulty clearing secretions  - no nausea, vomiting, no back pain  She was admitted for UTI and possible Aspiration    Pt seen and examined today for UTI/Pyelo/ Aspiration  still has cough with retained sectretions  but sob better  no fever    REVIEW OF SYSTEMS:  CONSTITUTIONAL: No fever, weight loss, or fatigue  EYES: No eye pain, visual disturbances, or discharge  ENMT:  No difficulty hearing, tinnitus, vertigo; No sinus or throat pain  NECK: No pain or stiffness  BREASTS: No pain, masses, or nipple discharge  RESPIRATORY: cough, shortness of breath  CARDIOVASCULAR: No chest pain, palpitations, dizziness, or leg swelling  GASTROINTESTINAL: No abdominal or epigastric pain. No nausea, vomiting, or hematemesis; No diarrhea or constipation. No melena or hematochezia.  GENITOURINARY: No dysuria, frequency, hematuria, or incontinence  NEUROLOGICAL: No headaches, memory loss, loss of strength, numbness, or tremors  SKIN: No itching, burning, rashes, or lesions   LYMPH NODES: No enlarged glands  ENDOCRINE: No heat or cold intolerance; No hair loss  MUSCULOSKELETAL: No joint pain or swelling; No muscle, back, or extremity pain  PSYCHIATRIC: No depression, anxiety, mood swings, or difficulty sleeping  HEME/LYMPH: No easy bruising, or bleeding gums  ALLERY AND IMMUNOLOGIC: No hives or eczema    VITALS:  T(F): 97.5, Max: 97.5 (05-13-18 @ 05:04)  HR: 95  BP: 129/58  RR: 20  SpO2: 98%    PHYSICAL EXAM:  GENERAL: NAD,   HEAD:  Atraumatic, Normocephalic  EYES: EOMI, PERRLA, conjunctiva and sclera clear  ENMT: No tonsillar erythema, exudates, or enlargement; Moist mucous membranes, Good dentition, No lesions  NECK: Supple, No JVD, Normal thyroid  NERVOUS SYSTEM:  Alert & Oriented X3, Good concentration;   CHEST/LUNG: rhonchi bilateral  HEART: Regular rate and rhythm; No murmurs, rubs, or gallops  ABDOMEN: Soft, Nontender, Nondistended; Bowel sounds present  EXTREMITIES:  2+ Peripheral Pulses, No clubbing, cyanosis, or edema  LYMPH: No lymphadenopathy noted  SKIN: No rashes or lesions      LABS:    05-12    134<L>  |  87<L>  |  13  ----------------------------<  144<H>  3.7   |  29  |  0.8    Ca    6.5<L>      12 May 2018 07:09                            13.4   4.63  )-----------( 414      ( 13 May 2018 08:31 )             41.5       Culture - Blood (collected 11 May 2018 19:00)  Source: .Blood None  Preliminary Report (13 May 2018 03:01):    No growth to date.    Culture - Blood (collected 10 May 2018 23:32)  Source: .Blood None  Preliminary Report (12 May 2018 06:01):    No growth to date.    Culture - Urine (collected 10 May 2018 18:35)  Source: .Urine Clean Catch (Midstream)  Final Report (12 May 2018 22:27):    >100,000 CFU/ml Enterococcus faecalis    50,000 - 99,000 CFU/mL Enterococcus faecium (vancomycin resistant)  Organism: Enterococcus faecalis  Enterococcus faecium (vancomycin resistant) (12 May 2018 22:27)  Organism: Enterococcus faecium (vancomycin resistant) (12 May 2018 22:27)  Organism: Enterococcus faecalis (12 May 2018 22:27)    Culture - Blood (collected 10 May 2018 16:13)  Source: .Blood Blood  Preliminary Report (12 May 2018 01:02):    No growth to date.          RADIOLOGY & ADDITIONAL TESTS:    Imaging Personally Reviewed:  [ ] YES  [ ] NO    MEDICATIONS:     MEDICATIONS  (STANDING):  ALBUTerol/ipratropium for Nebulization 3 milliLiter(s) Nebulizer every 6 hours  buDESOnide  80 MICROgram(s)/formoterol 4.5 MICROgram(s) Inhaler 2 Puff(s) Inhalation two times a day  calcium carbonate 1250 mG + Vitamin D (OsCal 500 + D) 1 Tablet(s) Oral daily  carbidopa/levodopa  10/100 1 Tablet(s) Oral three times a day  enoxaparin Injectable 40 milliGRAM(s) SubCutaneous every 24 hours  folic acid 1 milliGRAM(s) Oral daily  furosemide   Injectable 40 milliGRAM(s) IV Push daily  levothyroxine 75 MICROGram(s) Oral daily  linezolid  IVPB 600 milliGRAM(s) IV Intermittent every 12 hours  linezolid  IVPB      methylPREDNISolone sodium succinate Injectable 60 milliGRAM(s) IV Push two times a day  montelukast 10 milliGRAM(s) Oral at bedtime    MEDICATIONS  (PRN):  acetaminophen   Tablet 650 milliGRAM(s) Oral every 6 hours PRN For Temp greater than 38 C (100.4 F)  ALPRAZolam 0.5 milliGRAM(s) Oral two times a day PRN anxiety
Interval history and ROS:    No new complaints, Tmax 101.2  Mild dyspnea and cough  No pain    MEDICATIONS:  acetaminophen   Tablet 650 milliGRAM(s) Oral every 6 hours PRN  ALBUTerol/ipratropium for Nebulization 3 milliLiter(s) Nebulizer every 6 hours  buDESOnide  80 MICROgram(s)/formoterol 4.5 MICROgram(s) Inhaler 2 Puff(s) Inhalation two times a day  calcium carbonate 1250 mG + Vitamin D (OsCal 500 + D) 1 Tablet(s) Oral daily  carbidopa/levodopa  10/100 1 Tablet(s) Oral three times a day  cefepime   IVPB 1000 milliGRAM(s) IV Intermittent every 12 hours  enoxaparin Injectable 40 milliGRAM(s) SubCutaneous every 24 hours  folic acid 1 milliGRAM(s) Oral daily  furosemide   Injectable 40 milliGRAM(s) IV Push daily  levothyroxine 75 MICROGram(s) Oral daily  methylPREDNISolone sodium succinate Injectable 60 milliGRAM(s) IV Push two times a day  montelukast 10 milliGRAM(s) Oral at bedtime      VITAL SIGNS:  Vital Signs Last 24 Hrs  T(C): 36.4 (12 May 2018 05:28), Max: 38.4 (11 May 2018 20:11)  T(F): 97.5 (12 May 2018 05:28), Max: 101.2 (11 May 2018 20:11)  HR: 99 (12 May 2018 05:28) (83 - 113)  BP: 103/76 (12 May 2018 05:28) (103/76 - 169/70)  BP(mean): --  RR: 18 (12 May 2018 05:28) (18 - 20)  SpO2: 98% (11 May 2018 22:27) (98% - 98%)        PHYSICAL EXAM:  Awake and alert NAD  Neck supple, no LN  S1 and S2 no murmur  Lungs bibasilar rhonchi, prolonged expiratory phase  Abdomen is soft and non tender  There is no edema  Neurologically non focal      LABS:                        11.3   2.80  )-----------( 203      ( 11 May 2018 08:32 )             35.2     05-    136  |  88<L>  |  9<L>  ----------------------------<  75  4.0   |  25  |  0.8    Ca    7.0<L>      11 May 2018 08:32  Mg     1.6     05-11    TPro  6.8  /  Alb  3.5  /  TBili  0.7  /  DBili  <0.2  /  AST  68<H>  /  ALT  10  /  AlkPhos  94  05-11    LIVER FUNCTIONS - ( 11 May 2018 08:32 )  Alb: 3.5 g/dL / Pro: 6.8 g/dL / ALK PHOS: 94 U/L / ALT: 10 U/L / AST: 68 U/L / GGT: x           PT/INR - ( 10 May 2018 17:43 )   PT: T.N.P. sec;   INR: T.N.P. ratio         PTT - ( 10 May 2018 17:43 )  PTT:T.N.P. sec  Urinalysis Basic - ( 10 May 2018 18:35 )    Color: Yellow / Appearance: Clear / S.015 / pH: x  Gluc: x / Ketone: 15  / Bili: Negative / Urobili: 1.0 mg/dL   Blood: x / Protein: 30 mg/dL / Nitrite: Negative   Leuk Esterase: Trace / RBC: x / WBC 10-25 /HPF   Sq Epi: x / Non Sq Epi: Moderate /HPF / Bacteria: Moderate /HPF      CARDIAC MARKERS ( 10 May 2018 17:43 )  x     / <0.01 ng/mL / 139 U/L / x     / 1.4 ng/mL    Serum Pro-Brain Natriuretic Peptide (05.10.18 @ 23:32)    Serum Pro-Brain Natriuretic Peptide: 1185 pg/mL            RADIOLOGY & ADDITIONAL TESTS:   Xray Chest 1 View-PORTABLE IMMEDIATE (05.10.18 @ 16:41)  No radiographic evidence of acute cardiopulmonary disease.    [facility] Duplex Lower Ext Vein Scan, Bilat (18 @ 15:59)   No evidence of deep venous thrombosis or superficial thrombophlebitis in   bilateral lower extremities.
PATIENT WAS SEEN AND EXAMINED 5/13/18  FOR UNKNOWN REASONS DOCUMENTATION DID NOT SAVE ON DATE OF SERVICE    Interval history and ROS:    Still with congestion and wheeze    MEDICATIONS:  acetaminophen   Tablet 650 milliGRAM(s) Oral every 6 hours PRN  ALBUTerol/ipratropium for Nebulization 3 milliLiter(s) Nebulizer every 6 hours  ALPRAZolam 0.5 milliGRAM(s) Oral two times a day PRN  buDESOnide  80 MICROgram(s)/formoterol 4.5 MICROgram(s) Inhaler 2 Puff(s) Inhalation two times a day  calcium carbonate 1250 mG + Vitamin D (OsCal 500 + D) 1 Tablet(s) Oral daily  carbidopa/levodopa  10/100 1 Tablet(s) Oral three times a day  enoxaparin Injectable 40 milliGRAM(s) SubCutaneous every 24 hours  fentaNYL   Patch  12 MICROgram(s)/Hr 1 Patch Transdermal every 72 hours  folic acid 1 milliGRAM(s) Oral daily  furosemide   Injectable 40 milliGRAM(s) IV Push daily  levothyroxine 75 MICROGram(s) Oral daily  linezolid    Tablet 600 milliGRAM(s) Oral every 12 hours  methylPREDNISolone sodium succinate Injectable 60 milliGRAM(s) IV Push two times a day  montelukast 10 milliGRAM(s) Oral at bedtime  morphine ER Tablet 30 milliGRAM(s) Oral two times a day  morphine ER Tablet 15 milliGRAM(s) Oral two times a day PRN      VITAL SIGNS:  Vital Signs Last 24 Hrs  T(C): 36.4 (14 May 2018 13:28), Max: 36.4 (14 May 2018 13:28)  T(F): 97.6 (14 May 2018 13:28), Max: 97.6 (14 May 2018 13:28)  HR: 93 (14 May 2018 13:28) (87 - 93)  BP: 117/88 (14 May 2018 13:28) (117/88 - 140/59)  BP(mean): --  RR: 16 (14 May 2018 13:28) (16 - 20)  SpO2: 93% (13 May 2018 20:00) (93% - 93%)        PHYSICAL EXAM:  Awake and alert NAD  Neck supple, no LN  S1 and S2 no murmur  Lungs wheeze, rales rhonchi  Abdomen is soft and non tender  There is no edema  Neurologically non focal                          LABS:                        10.3   3.98  )-----------( 462      ( 14 May 2018 10:01 )             32.0     05-14    138  |  89<L>  |  27<H>  ----------------------------<  276<H>  3.7   |  30  |  1.0    Ca    6.0<L>      14 May 2018 10:01                    RADIOLOGY & ADDITIONAL TESTS:   Xray Chest 1 View-PORTABLE IMMEDIATE (05.12.18 @ 22:39)   No airspace opacities, pleural effusions or pneumothoraces.  Feeding tube position appropriately.            :
SUBJECTIVE:    Patient is a 77y old Female who presents with a chief complaint of sob & fever (10 May 2018 21:42)    Today is hospital day 2d. This morning she is resting comfortably in bed and reports no new issues or overnight events.     PAST MEDICAL & SURGICAL HISTORY  Pulmonary embolism  Seasonal allergies  DWAYNE on CPAP  Obesity: s/p gastric bypass ~15 yrs ago ~100 lb loss  Osteoarthritis  Rheumatoid arthritis  Fibromyalgia  Tremors of nervous system: essential tremors  Psoriatic arthritis  GERD (gastroesophageal reflux disease)  Hypothyroidism: no recent dosage changes  Ankle deformity: s/p b/l surgical repair  H/O gastric bypass    SOCIAL HISTORY:  Negative for smoking/alcohol/drug use.     ALLERGIES:  adhesives (Pruritus; Rash)  Betadine (Flushing (Skin); Pruritus)  NSAIDs (Vomiting; Rash; Nausea)    MEDICATIONS:  STANDING MEDICATIONS  ALBUTerol/ipratropium for Nebulization 3 milliLiter(s) Nebulizer every 6 hours  ampicillin  IVPB 1 Gram(s) IV Intermittent once  ampicillin  IVPB 1 Gram(s) IV Intermittent every 6 hours  ampicillin  IVPB      buDESOnide  80 MICROgram(s)/formoterol 4.5 MICROgram(s) Inhaler 2 Puff(s) Inhalation two times a day  calcium carbonate 1250 mG + Vitamin D (OsCal 500 + D) 1 Tablet(s) Oral daily  carbidopa/levodopa  10/100 1 Tablet(s) Oral three times a day  enoxaparin Injectable 40 milliGRAM(s) SubCutaneous every 24 hours  folic acid 1 milliGRAM(s) Oral daily  furosemide   Injectable 40 milliGRAM(s) IV Push daily  levothyroxine 75 MICROGram(s) Oral daily  methylPREDNISolone sodium succinate Injectable 60 milliGRAM(s) IV Push two times a day  montelukast 10 milliGRAM(s) Oral at bedtime    PRN MEDICATIONS  acetaminophen   Tablet 650 milliGRAM(s) Oral every 6 hours PRN    VITALS:   T(F): 97.5  HR: 99  BP: 103/76  RR: 18  SpO2: 98%    LABS:                        11.6   2.07  )-----------( 270      ( 12 May 2018 07:09 )             35.8     05-11    136  |  88<L>  |  9<L>  ----------------------------<  75  4.0   |  25  |  0.8    Ca    7.0<L>      11 May 2018 08:32  Mg     1.6         TPro  6.8  /  Alb  3.5  /  TBili  0.7  /  DBili  <0.2  /  AST  68<H>  /  ALT  10  /  AlkPhos  94  11    PT/INR - ( 10 May 2018 17:43 )   PT: T.N.P. sec;   INR: T.N.P. ratio         PTT - ( 10 May 2018 17:43 )  PTT:T.N.P. sec  Urinalysis Basic - ( 10 May 2018 18:35 )    Color: Yellow / Appearance: Clear / S.015 / pH: x  Gluc: x / Ketone: 15  / Bili: Negative / Urobili: 1.0 mg/dL   Blood: x / Protein: 30 mg/dL / Nitrite: Negative   Leuk Esterase: Trace / RBC: x / WBC 10-25 /HPF   Sq Epi: x / Non Sq Epi: Moderate /HPF / Bacteria: Moderate /HPF            Culture - Blood (collected 10 May 2018 23:32)  Source: .Blood None  Preliminary Report (12 May 2018 06:01):    No growth to date.    Culture - Urine (collected 10 May 2018 18:35)  Source: .Urine Clean Catch (Midstream)  Preliminary Report (11 May 2018 21:10):    >100,000 CFU/ml Enterococcus faecalis    50,000 - 99,000 CFU/mL Enterococcus faecium    Culture - Blood (collected 10 May 2018 16:13)  Source: .Blood Blood  Preliminary Report (12 May 2018 01:02):    No growth to date.      CARDIAC MARKERS ( 10 May 2018 17:43 )  x     / <0.01 ng/mL / 139 U/L / x     / 1.4 ng/mL      RADIOLOGY:    PHYSICAL EXAM:  GEN: No acute distress  LUNGS: Clear to auscultation bilaterally   HEART: S1/S2 present. RRR.   ABD: Soft, non-tender, non-distended. Bowel sounds present  EXT: NC/NC/NE/KHALIL  NEURO: AAOX3
SUBJECTIVE:    Patient is a 77y old Female who presents with a chief complaint of sob & fever (10 May 2018 21:42)    Today is hospital day 4d. This morning she is resting comfortably in bed and reports no new issues or overnight events.     PAST MEDICAL & SURGICAL HISTORY  Pulmonary embolism  Seasonal allergies  DWAYNE on CPAP  Obesity: s/p gastric bypass ~15 yrs ago ~100 lb loss  Osteoarthritis  Rheumatoid arthritis  Fibromyalgia  Tremors of nervous system: essential tremors  Psoriatic arthritis  GERD (gastroesophageal reflux disease)  Hypothyroidism: no recent dosage changes  Ankle deformity: s/p b/l surgical repair  H/O gastric bypass    SOCIAL HISTORY:  Negative for smoking/alcohol/drug use.     ALLERGIES:  adhesives (Pruritus; Rash)  Betadine (Flushing (Skin); Pruritus)  NSAIDs (Vomiting; Rash; Nausea)    MEDICATIONS:  STANDING MEDICATIONS  ALBUTerol/ipratropium for Nebulization 3 milliLiter(s) Nebulizer every 6 hours  buDESOnide  80 MICROgram(s)/formoterol 4.5 MICROgram(s) Inhaler 2 Puff(s) Inhalation two times a day  calcium carbonate 1250 mG + Vitamin D (OsCal 500 + D) 1 Tablet(s) Oral daily  carbidopa/levodopa  10/100 1 Tablet(s) Oral three times a day  enoxaparin Injectable 40 milliGRAM(s) SubCutaneous every 24 hours  fentaNYL   Patch  12 MICROgram(s)/Hr 1 Patch Transdermal every 72 hours  folic acid 1 milliGRAM(s) Oral daily  furosemide   Injectable 40 milliGRAM(s) IV Push daily  levothyroxine 75 MICROGram(s) Oral daily  linezolid  IVPB 600 milliGRAM(s) IV Intermittent every 12 hours  linezolid  IVPB      methylPREDNISolone sodium succinate Injectable 60 milliGRAM(s) IV Push two times a day  montelukast 10 milliGRAM(s) Oral at bedtime    PRN MEDICATIONS  acetaminophen   Tablet 650 milliGRAM(s) Oral every 6 hours PRN  ALPRAZolam 0.5 milliGRAM(s) Oral two times a day PRN  HYDROmorphone  Injectable 0.5 milliGRAM(s) IV Push every 12 hours PRN    VITALS:   T(F): 97.2  HR: 87  BP: 140/59  RR: 20  SpO2: 93%    LABS:                        10.3   3.98  )-----------( 462      ( 14 May 2018 10:01 )             32.0     05-14    138  |  89<L>  |  27<H>  ----------------------------<  276<H>  3.7   |  30  |  1.0    Ca    6.0<L>      14 May 2018 10:01                Culture - Blood (collected 12 May 2018 07:09)  Source: .Blood None  Preliminary Report (13 May 2018 16:01):    No growth to date.    Culture - Blood (collected 12 May 2018 07:09)  Source: .Blood None  Preliminary Report (13 May 2018 16:01):    No growth to date.    Culture - Blood (collected 11 May 2018 19:00)  Source: .Blood None  Preliminary Report (13 May 2018 03:01):    No growth to date.          RADIOLOGY:    PHYSICAL EXAM:  GEN: No acute distress  LUNGS: Clear to auscultation bilaterally   HEART: S1/S2 present. RRR.   ABD: Soft, non-tender, non-distended. Bowel sounds present  EXT: NC/NC/NE/KHALIL  NEURO: AAOX3
SUBJECTIVE:    Patient is a 77y old Female who presents with a chief complaint of sob & fever (10 May 2018 21:42)    Today is hospital day 5d. This morning she is resting comfortably in bed and reports no new issues or overnight events.     PAST MEDICAL & SURGICAL HISTORY  Pulmonary embolism  Seasonal allergies  DWAYNE on CPAP  Obesity: s/p gastric bypass ~15 yrs ago ~100 lb loss  Osteoarthritis  Rheumatoid arthritis  Fibromyalgia  Tremors of nervous system: essential tremors  Psoriatic arthritis  GERD (gastroesophageal reflux disease)  Hypothyroidism: no recent dosage changes  Ankle deformity: s/p b/l surgical repair  H/O gastric bypass    SOCIAL HISTORY:  Negative for smoking/alcohol/drug use.     ALLERGIES:  adhesives (Pruritus; Rash)  Betadine (Flushing (Skin); Pruritus)  NSAIDs (Vomiting; Rash; Nausea)    MEDICATIONS:  STANDING MEDICATIONS  ALBUTerol/ipratropium for Nebulization 3 milliLiter(s) Nebulizer every 6 hours  buDESOnide  80 MICROgram(s)/formoterol 4.5 MICROgram(s) Inhaler 2 Puff(s) Inhalation two times a day  calcium carbonate 1250 mG + Vitamin D (OsCal 500 + D) 1 Tablet(s) Oral daily  carbidopa/levodopa  10/100 1 Tablet(s) Oral three times a day  enoxaparin Injectable 40 milliGRAM(s) SubCutaneous every 24 hours  fentaNYL   Patch  12 MICROgram(s)/Hr 1 Patch Transdermal every 72 hours  folic acid 1 milliGRAM(s) Oral daily  furosemide    Tablet 40 milliGRAM(s) Oral daily  levothyroxine 75 MICROGram(s) Oral daily  linezolid    Tablet 600 milliGRAM(s) Oral every 12 hours  montelukast 10 milliGRAM(s) Oral at bedtime  morphine ER Tablet 30 milliGRAM(s) Oral two times a day  predniSONE   Tablet 40 milliGRAM(s) Oral daily    PRN MEDICATIONS  acetaminophen   Tablet 650 milliGRAM(s) Oral every 6 hours PRN  loperamide 2 milliGRAM(s) Oral every 3 hours PRN  morphine ER Tablet 15 milliGRAM(s) Oral two times a day PRN    VITALS:   T(F): 97.2  HR: 86  BP: 133/58  RR: 18  SpO2: --    LABS:                        10.1   5.42  )-----------( 481      ( 15 May 2018 08:12 )             31.3     05-15    138  |  90<L>  |  27<H>  ----------------------------<  154<H>  3.7   |  32  |  1.2    Ca    6.6<L>      15 May 2018 08:12                    RADIOLOGY:    PHYSICAL EXAM:  GEN: No acute distress  LUNGS: Clear to auscultation bilaterally   HEART: S1/S2 present. RRR.   ABD: Soft, non-tender, non-distended. Bowel sounds present  EXT: NC/NC/NE/KHALIL  NEURO: AAOX3
YONATAN MEADE  77y, Female  Allergy: adhesives (Pruritus; Rash)  Betadine (Flushing (Skin); Pruritus)  NSAIDs (Vomiting; Rash; Nausea)    PMH/PSH:  PAST MEDICAL & SURGICAL HISTORY:  Pulmonary embolism  Seasonal allergies  DWAYNE on CPAP  Obesity: s/p gastric bypass ~15 yrs ago ~100 lb loss  Osteoarthritis  Rheumatoid arthritis  Fibromyalgia  Tremors of nervous system: essential tremors  Psoriatic arthritis  GERD (gastroesophageal reflux disease)  Hypothyroidism: no recent dosage changes  Ankle deformity: s/p b/l surgical repair  H/O gastric bypass    ALLERGIES:  Allergies    adhesives (Pruritus; Rash)  Betadine (Flushing (Skin); Pruritus)  NSAIDs (Vomiting; Rash; Nausea)        LAST 24-Hr EVENTS:  sitting comfortabley in bed  sob improved  daughter at bedside    VITALS:  T(F): 97 (05-15-18 @ 20:08), Max: 97.2 (05-15-18 @ 05:14)  HR: 81 (05-15-18 @ 20:08)  BP: 127/59 (05-15-18 @ 20:08) (127/59 - 138/60)  RR: 18 (05-15-18 @ 20:08)  SpO2: --    PHYSICAL EXAM:    GENERAL: NAD, well-developed  NECK: Supple, No JVD  CHEST/LUNG: CTA bilaterally; No wheeze, No ronchi, No crackles  HEART: Regular rate and rhythm; No murmurs.  ABDOMEN: Soft, Nontender, Nondistended; Bowel sounds present  EXTREMITIES:  No clubbing, edema absent        TESTS & MEASUREMENTS:    IN: 1200 mL / OUT: 0 mL / NET: 1200 mL    IN: 240 mL / OUT: 0 mL / NET: 240 mL                            10.1   5.42  )-----------( 481      ( 15 May 2018 08:12 )             31.3       05-15    138  |  90<L>  |  27<H>  ----------------------------<  154<H>  3.7   |  32  |  1.2    Ca    6.6<L>      15 May 2018 08:12              Culture - Blood (collected 05-12-18 @ 07:09)  Source: .Blood None  Preliminary Report (05-13-18 @ 16:01):    No growth to date.    Culture - Blood (collected 05-12-18 @ 07:09)  Source: .Blood None  Preliminary Report (05-13-18 @ 16:01):    No growth to date.    Culture - Blood (collected 05-11-18 @ 19:00)  Source: .Blood None  Preliminary Report (05-13-18 @ 03:01):    No growth to date.    Culture - Blood (collected 05-10-18 @ 23:32)  Source: .Blood None  Preliminary Report (05-12-18 @ 06:01):    No growth to date.    Culture - Urine (collected 05-10-18 @ 18:35)  Source: .Urine Clean Catch (Midstream)  Final Report (05-12-18 @ 22:27):    >100,000 CFU/ml Enterococcus faecalis    50,000 - 99,000 CFU/mL Enterococcus faecium (vancomycin resistant)  Organism: Enterococcus faecalis  Enterococcus faecium (vancomycin resistant) (05-12-18 @ 22:27)  Organism: Enterococcus faecium (vancomycin resistant) (05-12-18 @ 22:27)      -  Ampicillin: R >8      -  Ciprofloxacin: R >2      -  Nitrofurantoin: I 64      -  Tetra/Doxy: R >8      -  Vancomycin: R >16      Method Type: MARC  Organism: Enterococcus faecalis (05-12-18 @ 22:27)      -  Ampicillin: S <=2      -  Ciprofloxacin: R >2      -  Nitrofurantoin: S <=32      -  Tetra/Doxy: R >8      -  Vancomycin: S 2      Method Type: MARC    Culture - Blood (collected 05-10-18 @ 16:13)  Source: .Blood Blood  Preliminary Report (05-12-18 @ 01:02):    No growth to date.          ABG & VENT SETTINGS: (when applicable)        RADIOLOGY & ADDITIONAL TESTS:  < from: Xray Chest 1 View AP/PA (05.13.18 @ 13:48) >  Impression:      No radiographic evidence of acute cardiopulmonary disease.    < end of copied text >        MEDICATIONS:  MEDICATIONS  (STANDING):  ALBUTerol/ipratropium for Nebulization 3 milliLiter(s) Nebulizer every 6 hours  buDESOnide  80 MICROgram(s)/formoterol 4.5 MICROgram(s) Inhaler 2 Puff(s) Inhalation two times a day  calcium carbonate 1250 mG + Vitamin D (OsCal 500 + D) 1 Tablet(s) Oral daily  carbidopa/levodopa  10/100 1 Tablet(s) Oral three times a day  enoxaparin Injectable 40 milliGRAM(s) SubCutaneous every 24 hours  fentaNYL   Patch  12 MICROgram(s)/Hr 1 Patch Transdermal every 72 hours  folic acid 1 milliGRAM(s) Oral daily  furosemide    Tablet 40 milliGRAM(s) Oral daily  levothyroxine 75 MICROGram(s) Oral daily  linezolid    Tablet 600 milliGRAM(s) Oral every 12 hours  montelukast 10 milliGRAM(s) Oral at bedtime  morphine ER Tablet 30 milliGRAM(s) Oral two times a day  predniSONE   Tablet 40 milliGRAM(s) Oral daily    MEDICATIONS  (PRN):  acetaminophen   Tablet 650 milliGRAM(s) Oral every 6 hours PRN For Temp greater than 38 C (100.4 F)  loperamide 2 milliGRAM(s) Oral every 3 hours PRN Diarrhea  morphine ER Tablet 15 milliGRAM(s) Oral two times a day PRN severe pain
YONATAN MEADE  77y, Female  Allergy: adhesives (Pruritus; Rash)  Betadine (Flushing (Skin); Pruritus)  NSAIDs (Vomiting; Rash; Nausea)    PMH/PSH:  PAST MEDICAL & SURGICAL HISTORY:  Pulmonary embolism  Seasonal allergies  DWAYNE on CPAP  Obesity: s/p gastric bypass ~15 yrs ago ~100 lb loss  Osteoarthritis  Rheumatoid arthritis  Fibromyalgia  Tremors of nervous system: essential tremors  Psoriatic arthritis  GERD (gastroesophageal reflux disease)  Hypothyroidism: no recent dosage changes  Ankle deformity: s/p b/l surgical repair  H/O gastric bypass    ALLERGIES:  Allergies    adhesives (Pruritus; Rash)  Betadine (Flushing (Skin); Pruritus)  NSAIDs (Vomiting; Rash; Nausea)    Intolerances            LAST 24-Hr EVENTS:  pt seen examined  cough improved  feels better overall    VITALS:  T(F): 97 (05-16-18 @ 05:23), Max: 97 (05-16-18 @ 05:23)  HR: 86 (05-16-18 @ 05:23)  BP: 113/79 (05-16-18 @ 05:23) (113/79 - 113/79)  RR: 18 (05-16-18 @ 05:23)  SpO2: --    PHYSICAL EXAM:    GENERAL: NAD, well-developed  NECK: Supple, No JVD  CHEST/LUNG: CTA bilaterally  HEART: Regular rate and rhythm; No murmurs.  ABDOMEN: Soft, Nontender, Nondistended; Bowel sounds present  EXTREMITIES:  No clubbing, edema absent        TESTS & MEASUREMENTS:    IN: 240 mL / OUT: 0 mL / NET: 240 mL                            11.5   8.35  )-----------( 658      ( 16 May 2018 08:08 )             35.6       05-16    136  |  86<L>  |  35<H>  ----------------------------<  161<H>  4.4   |  30  |  2.1<H>    Ca    7.0<L>      16 May 2018 08:08              Culture - Blood (collected 05-12-18 @ 07:09)  Source: .Blood None  Preliminary Report (05-13-18 @ 16:01):    No growth to date.    Culture - Blood (collected 05-12-18 @ 07:09)  Source: .Blood None  Preliminary Report (05-13-18 @ 16:01):    No growth to date.    Culture - Blood (collected 05-11-18 @ 19:00)  Source: .Blood None  Preliminary Report (05-13-18 @ 03:01):    No growth to date.    Culture - Blood (collected 05-10-18 @ 23:32)  Source: .Blood None  Final Report (05-16-18 @ 06:00):    No growth at 5 days.    Culture - Urine (collected 05-10-18 @ 18:35)  Source: .Urine Clean Catch (Midstream)  Final Report (05-12-18 @ 22:27):    >100,000 CFU/ml Enterococcus faecalis    50,000 - 99,000 CFU/mL Enterococcus faecium (vancomycin resistant)  Organism: Enterococcus faecalis  Enterococcus faecium (vancomycin resistant) (05-12-18 @ 22:27)  Organism: Enterococcus faecium (vancomycin resistant) (05-12-18 @ 22:27)      -  Ampicillin: R >8      -  Ciprofloxacin: R >2      -  Nitrofurantoin: I 64      -  Tetra/Doxy: R >8      -  Vancomycin: R >16      Method Type: MARC  Organism: Enterococcus faecalis (05-12-18 @ 22:27)      -  Ampicillin: S <=2      -  Ciprofloxacin: R >2      -  Nitrofurantoin: S <=32      -  Tetra/Doxy: R >8      -  Vancomycin: S 2      Method Type: MARC    Culture - Blood (collected 05-10-18 @ 16:13)  Source: .Blood Blood  Final Report (05-16-18 @ 01:00):    No growth at 5 days.                  MEDICATIONS:  MEDICATIONS  (STANDING):  ALBUTerol/ipratropium for Nebulization 3 milliLiter(s) Nebulizer every 6 hours  buDESOnide  80 MICROgram(s)/formoterol 4.5 MICROgram(s) Inhaler 2 Puff(s) Inhalation two times a day  calcium carbonate 1250 mG + Vitamin D (OsCal 500 + D) 1 Tablet(s) Oral daily  carbidopa/levodopa  10/100 1 Tablet(s) Oral three times a day  enoxaparin Injectable 40 milliGRAM(s) SubCutaneous every 24 hours  fentaNYL   Patch  12 MICROgram(s)/Hr 1 Patch Transdermal every 72 hours  folic acid 1 milliGRAM(s) Oral daily  furosemide    Tablet 40 milliGRAM(s) Oral daily  levothyroxine 75 MICROGram(s) Oral daily  linezolid    Tablet 600 milliGRAM(s) Oral every 12 hours  montelukast 10 milliGRAM(s) Oral at bedtime  morphine ER Tablet 30 milliGRAM(s) Oral two times a day  predniSONE   Tablet 40 milliGRAM(s) Oral daily    MEDICATIONS  (PRN):  acetaminophen   Tablet 650 milliGRAM(s) Oral every 6 hours PRN For Temp greater than 38 C (100.4 F)  loperamide 2 milliGRAM(s) Oral every 3 hours PRN Diarrhea  morphine ER Tablet 15 milliGRAM(s) Oral two times a day PRN severe pain
YONATAN MEADE  77y, Female  Allergy: adhesives (Pruritus; Rash)  Betadine (Flushing (Skin); Pruritus)  NSAIDs (Vomiting; Rash; Nausea)    PMH/PSH:  PAST MEDICAL & SURGICAL HISTORY:  Pulmonary embolism  Seasonal allergies  DWAYNE on CPAP  Obesity: s/p gastric bypass ~15 yrs ago ~100 lb loss  Osteoarthritis  Rheumatoid arthritis  Fibromyalgia  Tremors of nervous system: essential tremors  Psoriatic arthritis  GERD (gastroesophageal reflux disease)  Hypothyroidism: no recent dosage changes  Ankle deformity: s/p b/l surgical repair  H/O gastric bypass    ALLERGIES:  Allergies    adhesives (Pruritus; Rash)  Betadine (Flushing (Skin); Pruritus)  NSAIDs (Vomiting; Rash; Nausea)    Intolerances    LAST 24-Hr EVENTS:  cough improved  feeling better overall    VITALS:  T(F): 97.6 (05-14-18 @ 13:28), Max: 97.6 (05-14-18 @ 13:28)  HR: 93 (05-14-18 @ 13:28)  BP: 117/88 (05-14-18 @ 13:28) (117/88 - 140/59)  RR: 16 (05-14-18 @ 13:28)  SpO2: 93% (05-13-18 @ 20:00)    PHYSICAL EXAM:    GENERAL: NAD  NECK: Supple, No JVD  CHEST/LUNG: CTA bilaterally  HEART: Regular rate and rhythm; No murmurs.  ABDOMEN: Soft, Nontender, Nondistended; Bowel sounds present  EXTREMITIES:  No clubbing, edema absent        TESTS & MEASUREMENTS:    IN: 1350 mL / OUT: 0 mL / NET: 1350 mL    IN: 1200 mL / OUT: 0 mL / NET: 1200 mL                            10.3   3.98  )-----------( 462      ( 14 May 2018 10:01 )             32.0       05-14    138  |  89<L>  |  27<H>  ----------------------------<  276<H>  3.7   |  30  |  1.0    Ca    6.0<L>      14 May 2018 10:01              Culture - Blood (collected 05-12-18 @ 07:09)  Source: .Blood None  Preliminary Report (05-13-18 @ 16:01):    No growth to date.    Culture - Blood (collected 05-12-18 @ 07:09)  Source: .Blood None  Preliminary Report (05-13-18 @ 16:01):    No growth to date.    Culture - Blood (collected 05-11-18 @ 19:00)  Source: .Blood None  Preliminary Report (05-13-18 @ 03:01):    No growth to date.    Culture - Blood (collected 05-10-18 @ 23:32)  Source: .Blood None  Preliminary Report (05-12-18 @ 06:01):    No growth to date.    Culture - Urine (collected 05-10-18 @ 18:35)  Source: .Urine Clean Catch (Midstream)  Final Report (05-12-18 @ 22:27):    >100,000 CFU/ml Enterococcus faecalis    50,000 - 99,000 CFU/mL Enterococcus faecium (vancomycin resistant)  Organism: Enterococcus faecalis  Enterococcus faecium (vancomycin resistant) (05-12-18 @ 22:27)  Organism: Enterococcus faecium (vancomycin resistant) (05-12-18 @ 22:27)      -  Ampicillin: R >8      -  Ciprofloxacin: R >2      -  Nitrofurantoin: I 64      -  Tetra/Doxy: R >8      -  Vancomycin: R >16      Method Type: MARC  Organism: Enterococcus faecalis (05-12-18 @ 22:27)      -  Ampicillin: S <=2      -  Ciprofloxacin: R >2      -  Nitrofurantoin: S <=32      -  Tetra/Doxy: R >8      -  Vancomycin: S 2      Method Type: MARC    Culture - Blood (collected 05-10-18 @ 16:13)  Source: .Blood Blood  Preliminary Report (05-12-18 @ 01:02):    No growth to date.        MEDICATIONS:  MEDICATIONS  (STANDING):  ALBUTerol/ipratropium for Nebulization 3 milliLiter(s) Nebulizer every 6 hours  buDESOnide  80 MICROgram(s)/formoterol 4.5 MICROgram(s) Inhaler 2 Puff(s) Inhalation two times a day  calcium carbonate 1250 mG + Vitamin D (OsCal 500 + D) 1 Tablet(s) Oral daily  carbidopa/levodopa  10/100 1 Tablet(s) Oral three times a day  enoxaparin Injectable 40 milliGRAM(s) SubCutaneous every 24 hours  fentaNYL   Patch  12 MICROgram(s)/Hr 1 Patch Transdermal every 72 hours  folic acid 1 milliGRAM(s) Oral daily  furosemide   Injectable 40 milliGRAM(s) IV Push daily  levothyroxine 75 MICROGram(s) Oral daily  linezolid  IVPB 600 milliGRAM(s) IV Intermittent every 12 hours  linezolid  IVPB      methylPREDNISolone sodium succinate Injectable 60 milliGRAM(s) IV Push two times a day  montelukast 10 milliGRAM(s) Oral at bedtime    MEDICATIONS  (PRN):  acetaminophen   Tablet 650 milliGRAM(s) Oral every 6 hours PRN For Temp greater than 38 C (100.4 F)  ALPRAZolam 0.5 milliGRAM(s) Oral two times a day PRN anxiety  HYDROmorphone  Injectable 0.5 milliGRAM(s) IV Push every 12 hours PRN Severe Pain (7 - 10)        < from: Transthoracic Echocardiogram (05.12.18 @ 11:11) >  Summary:   1. Left ventricular ejection fraction, by visual estimation, is 60 to   65%.   2. Normal global left ventricular systolic function.   3. Spectral Doppler shows impaired relaxation pattern of left   ventricular myocardial filling (Grade I diastolic dysfunction).   4. Mild mitral annular calcification.   5. Thickening of the anterior and posterior mitral valve leaflets.   6. Mild aortic regurgitation.    < end of copied text >

## 2018-05-16 NOTE — SWALLOW BEDSIDE ASSESSMENT ADULT - SWALLOW EVAL: DIAGNOSIS
suspected pharyngeal dysphagia and increased risk for aspiration
+overt s/s of penetration/aspiration w/ nectar w/o use of consecutive swallow. +toleration of soft and nectar w/ use of consecutive swallow
+overt s/s of penetration/aspiration w/ thin , nectar and puree.
toleration observed for soft and thins

## 2018-05-16 NOTE — SWALLOW BEDSIDE ASSESSMENT ADULT - NS SPL SWALLOW CLINIC TRIAL FT
+overt s/s of penetration/aspiration persists
toleration observed
suspected pharyngeal dysphagia. increased wetness noted to breath sounds
+overt s/s of penetration/aspiration w/o use of consecutive swallow. +toleration w/ use

## 2018-05-17 LAB
CULTURE RESULTS: SIGNIFICANT CHANGE UP
SPECIMEN SOURCE: SIGNIFICANT CHANGE UP

## 2018-05-23 DIAGNOSIS — E83.51 HYPOCALCEMIA: ICD-10-CM

## 2018-05-23 DIAGNOSIS — Z86.711 PERSONAL HISTORY OF PULMONARY EMBOLISM: ICD-10-CM

## 2018-05-23 DIAGNOSIS — N39.0 URINARY TRACT INFECTION, SITE NOT SPECIFIED: ICD-10-CM

## 2018-05-23 DIAGNOSIS — Z88.8 ALLERGY STATUS TO OTHER DRUGS, MEDICAMENTS AND BIOLOGICAL SUBSTANCES: ICD-10-CM

## 2018-05-23 DIAGNOSIS — J45.901 UNSPECIFIED ASTHMA WITH (ACUTE) EXACERBATION: ICD-10-CM

## 2018-05-23 DIAGNOSIS — Z98.84 BARIATRIC SURGERY STATUS: ICD-10-CM

## 2018-05-23 DIAGNOSIS — G47.33 OBSTRUCTIVE SLEEP APNEA (ADULT) (PEDIATRIC): ICD-10-CM

## 2018-05-23 DIAGNOSIS — L40.50 ARTHROPATHIC PSORIASIS, UNSPECIFIED: ICD-10-CM

## 2018-05-23 DIAGNOSIS — Z16.21 RESISTANCE TO VANCOMYCIN: ICD-10-CM

## 2018-05-23 DIAGNOSIS — R19.7 DIARRHEA, UNSPECIFIED: ICD-10-CM

## 2018-05-23 DIAGNOSIS — J69.0 PNEUMONITIS DUE TO INHALATION OF FOOD AND VOMIT: ICD-10-CM

## 2018-05-23 DIAGNOSIS — Z51.89 ENCOUNTER FOR OTHER SPECIFIED AFTERCARE: ICD-10-CM

## 2018-05-23 DIAGNOSIS — I50.31 ACUTE DIASTOLIC (CONGESTIVE) HEART FAILURE: ICD-10-CM

## 2018-05-23 DIAGNOSIS — R06.00 DYSPNEA, UNSPECIFIED: ICD-10-CM

## 2018-05-23 DIAGNOSIS — G20 PARKINSON'S DISEASE: ICD-10-CM

## 2018-05-23 DIAGNOSIS — B95.2 ENTEROCOCCUS AS THE CAUSE OF DISEASES CLASSIFIED ELSEWHERE: ICD-10-CM

## 2018-05-23 DIAGNOSIS — M81.0 AGE-RELATED OSTEOPOROSIS WITHOUT CURRENT PATHOLOGICAL FRACTURE: ICD-10-CM

## 2018-05-23 DIAGNOSIS — Z99.89 DEPENDENCE ON OTHER ENABLING MACHINES AND DEVICES: ICD-10-CM

## 2018-05-23 DIAGNOSIS — R53.81 OTHER MALAISE: ICD-10-CM

## 2018-05-23 DIAGNOSIS — K21.9 GASTRO-ESOPHAGEAL REFLUX DISEASE WITHOUT ESOPHAGITIS: ICD-10-CM

## 2018-05-23 DIAGNOSIS — E03.9 HYPOTHYROIDISM, UNSPECIFIED: ICD-10-CM

## 2018-05-23 DIAGNOSIS — Z79.52 LONG TERM (CURRENT) USE OF SYSTEMIC STEROIDS: ICD-10-CM

## 2018-05-23 DIAGNOSIS — D64.9 ANEMIA, UNSPECIFIED: ICD-10-CM

## 2018-05-23 DIAGNOSIS — M79.7 FIBROMYALGIA: ICD-10-CM

## 2018-05-23 DIAGNOSIS — N17.9 ACUTE KIDNEY FAILURE, UNSPECIFIED: ICD-10-CM

## 2018-05-23 DIAGNOSIS — M06.9 RHEUMATOID ARTHRITIS, UNSPECIFIED: ICD-10-CM

## 2018-06-27 ENCOUNTER — OUTPATIENT (OUTPATIENT)
Dept: OUTPATIENT SERVICES | Facility: HOSPITAL | Age: 78
LOS: 1 days | Discharge: HOME | End: 2018-06-27

## 2018-06-27 DIAGNOSIS — Z98.84 BARIATRIC SURGERY STATUS: Chronic | ICD-10-CM

## 2018-06-27 DIAGNOSIS — R13.10 DYSPHAGIA, UNSPECIFIED: ICD-10-CM

## 2018-06-27 DIAGNOSIS — M21.969 UNSPECIFIED ACQUIRED DEFORMITY OF UNSPECIFIED LOWER LEG: Chronic | ICD-10-CM

## 2018-06-29 DIAGNOSIS — Z02.9 ENCOUNTER FOR ADMINISTRATIVE EXAMINATIONS, UNSPECIFIED: ICD-10-CM

## 2018-07-17 ENCOUNTER — OUTPATIENT (OUTPATIENT)
Dept: OUTPATIENT SERVICES | Facility: HOSPITAL | Age: 78
LOS: 1 days | Discharge: HOME | End: 2018-07-17

## 2018-07-17 DIAGNOSIS — Z98.84 BARIATRIC SURGERY STATUS: Chronic | ICD-10-CM

## 2018-07-17 DIAGNOSIS — G20 PARKINSON'S DISEASE: ICD-10-CM

## 2018-07-17 DIAGNOSIS — M21.969 UNSPECIFIED ACQUIRED DEFORMITY OF UNSPECIFIED LOWER LEG: Chronic | ICD-10-CM

## 2018-07-17 PROBLEM — J30.2 OTHER SEASONAL ALLERGIC RHINITIS: Chronic | Status: ACTIVE | Noted: 2018-03-23

## 2018-07-17 PROBLEM — M79.7 FIBROMYALGIA: Chronic | Status: ACTIVE | Noted: 2018-03-23

## 2018-07-17 PROBLEM — M06.9 RHEUMATOID ARTHRITIS, UNSPECIFIED: Chronic | Status: ACTIVE | Noted: 2018-03-23

## 2018-07-17 PROBLEM — L40.50 ARTHROPATHIC PSORIASIS, UNSPECIFIED: Chronic | Status: ACTIVE | Noted: 2018-03-23

## 2018-07-17 PROBLEM — I26.99 OTHER PULMONARY EMBOLISM WITHOUT ACUTE COR PULMONALE: Chronic | Status: ACTIVE | Noted: 2018-05-10

## 2018-07-17 PROBLEM — K21.9 GASTRO-ESOPHAGEAL REFLUX DISEASE WITHOUT ESOPHAGITIS: Chronic | Status: ACTIVE | Noted: 2018-03-23

## 2018-07-17 PROBLEM — R25.1 TREMOR, UNSPECIFIED: Chronic | Status: ACTIVE | Noted: 2018-03-23

## 2018-07-17 PROBLEM — M19.90 UNSPECIFIED OSTEOARTHRITIS, UNSPECIFIED SITE: Chronic | Status: ACTIVE | Noted: 2018-03-23

## 2018-07-17 PROBLEM — G47.33 OBSTRUCTIVE SLEEP APNEA (ADULT) (PEDIATRIC): Chronic | Status: ACTIVE | Noted: 2018-03-23

## 2018-07-17 PROBLEM — E03.9 HYPOTHYROIDISM, UNSPECIFIED: Chronic | Status: ACTIVE | Noted: 2018-03-23

## 2018-07-17 PROBLEM — E66.9 OBESITY, UNSPECIFIED: Chronic | Status: ACTIVE | Noted: 2018-03-23

## 2018-12-03 NOTE — CONSULT NOTE ADULT - PROBLEM SELECTOR RECOMMENDATION 9
12/3/2018         Halli A Koeppe   [address] [place] [id number]           Dear Halli A Koeppe:    We would like to provide the best care possible for our patients. Our records indicate that you are overdue for lab work that was ordered by FREDRICK Sow. These orders will remain in effect until 30 days from the date of this letter.    Please contact our office at [phone] to schedule an appointment to have these labs done.     Special Instructions:  The lab work that we have ordered is [facility] - Please do not eat any food or drink any beverages for 12 hours before having the testing done. You may have black coffee or water only; NO candy, gum, mints, soda or juice. Please take all medications as usual. Do not drink any alcoholic beverages for 24 hours prior to testing.      Sincerely,      Claudia Allen NP  
Follow cx     IV abx    complete 10 days of abx
Lab: vitamin D-25 hydrox-  Vitamin D-1,25 dihydroxy-  magnesium, serum  phosphorus, serum  celiac panel  CTx (c-telopeptide of type I collagen)  24 hour urine for calcium with creatinine  AFTER LAB OBTAINED: can start calcitriol 0.25 mcg bid

## 2019-03-19 ENCOUNTER — EMERGENCY (EMERGENCY)
Facility: HOSPITAL | Age: 79
LOS: 0 days | Discharge: HOME | End: 2019-03-20
Attending: STUDENT IN AN ORGANIZED HEALTH CARE EDUCATION/TRAINING PROGRAM | Admitting: STUDENT IN AN ORGANIZED HEALTH CARE EDUCATION/TRAINING PROGRAM

## 2019-03-19 VITALS
WEIGHT: 134.92 LBS | RESPIRATION RATE: 18 BRPM | SYSTOLIC BLOOD PRESSURE: 189 MMHG | TEMPERATURE: 98 F | HEART RATE: 77 BPM | DIASTOLIC BLOOD PRESSURE: 83 MMHG | OXYGEN SATURATION: 97 %

## 2019-03-19 DIAGNOSIS — Z98.84 BARIATRIC SURGERY STATUS: Chronic | ICD-10-CM

## 2019-03-19 DIAGNOSIS — M06.9 RHEUMATOID ARTHRITIS, UNSPECIFIED: ICD-10-CM

## 2019-03-19 DIAGNOSIS — M79.7 FIBROMYALGIA: ICD-10-CM

## 2019-03-19 DIAGNOSIS — M21.969 UNSPECIFIED ACQUIRED DEFORMITY OF UNSPECIFIED LOWER LEG: Chronic | ICD-10-CM

## 2019-03-19 DIAGNOSIS — R55 SYNCOPE AND COLLAPSE: ICD-10-CM

## 2019-03-19 LAB
BASOPHILS # BLD AUTO: 0.03 K/UL — SIGNIFICANT CHANGE UP (ref 0–0.2)
BASOPHILS NFR BLD AUTO: 0.6 % — SIGNIFICANT CHANGE UP (ref 0–1)
EOSINOPHIL # BLD AUTO: 0.06 K/UL — SIGNIFICANT CHANGE UP (ref 0–0.7)
EOSINOPHIL NFR BLD AUTO: 1.2 % — SIGNIFICANT CHANGE UP (ref 0–8)
HCT VFR BLD CALC: 39.3 % — SIGNIFICANT CHANGE UP (ref 37–47)
HGB BLD-MCNC: 13.2 G/DL — SIGNIFICANT CHANGE UP (ref 12–16)
IMM GRANULOCYTES NFR BLD AUTO: 0.4 % — HIGH (ref 0.1–0.3)
LYMPHOCYTES # BLD AUTO: 2.02 K/UL — SIGNIFICANT CHANGE UP (ref 1.2–3.4)
LYMPHOCYTES # BLD AUTO: 40.9 % — SIGNIFICANT CHANGE UP (ref 20.5–51.1)
MCHC RBC-ENTMCNC: 32.9 PG — HIGH (ref 27–31)
MCHC RBC-ENTMCNC: 33.6 G/DL — SIGNIFICANT CHANGE UP (ref 32–37)
MCV RBC AUTO: 98 FL — SIGNIFICANT CHANGE UP (ref 81–99)
MONOCYTES # BLD AUTO: 0.41 K/UL — SIGNIFICANT CHANGE UP (ref 0.1–0.6)
MONOCYTES NFR BLD AUTO: 8.3 % — SIGNIFICANT CHANGE UP (ref 1.7–9.3)
NEUTROPHILS # BLD AUTO: 2.4 K/UL — SIGNIFICANT CHANGE UP (ref 1.4–6.5)
NEUTROPHILS NFR BLD AUTO: 48.6 % — SIGNIFICANT CHANGE UP (ref 42.2–75.2)
NRBC # BLD: 0 /100 WBCS — SIGNIFICANT CHANGE UP (ref 0–0)
PLATELET # BLD AUTO: 127 K/UL — LOW (ref 130–400)
RBC # BLD: 4.01 M/UL — LOW (ref 4.2–5.4)
RBC # FLD: 14.4 % — SIGNIFICANT CHANGE UP (ref 11.5–14.5)
WBC # BLD: 4.94 K/UL — SIGNIFICANT CHANGE UP (ref 4.8–10.8)
WBC # FLD AUTO: 4.94 K/UL — SIGNIFICANT CHANGE UP (ref 4.8–10.8)

## 2019-03-19 NOTE — ED ADULT TRIAGE NOTE - CHIEF COMPLAINT QUOTE
Pt came after her  could not wake pt up for about 15 minutes, pt has no complaints in triage, stated she was fine all day, as per EMS patient is on polypharmacy at home including Fentanyl and Morphine.

## 2019-03-20 VITALS
TEMPERATURE: 97 F | RESPIRATION RATE: 17 BRPM | DIASTOLIC BLOOD PRESSURE: 62 MMHG | HEART RATE: 85 BPM | SYSTOLIC BLOOD PRESSURE: 134 MMHG | OXYGEN SATURATION: 96 %

## 2019-03-20 LAB
ALBUMIN SERPL ELPH-MCNC: 4.4 G/DL — SIGNIFICANT CHANGE UP (ref 3.5–5.2)
ALP SERPL-CCNC: 84 U/L — SIGNIFICANT CHANGE UP (ref 30–115)
ALT FLD-CCNC: 23 U/L — SIGNIFICANT CHANGE UP (ref 0–41)
ANION GAP SERPL CALC-SCNC: 12 MMOL/L — SIGNIFICANT CHANGE UP (ref 7–14)
APPEARANCE UR: CLEAR — SIGNIFICANT CHANGE UP
AST SERPL-CCNC: 36 U/L — SIGNIFICANT CHANGE UP (ref 0–41)
BILIRUB SERPL-MCNC: 0.7 MG/DL — SIGNIFICANT CHANGE UP (ref 0.2–1.2)
BILIRUB UR-MCNC: NEGATIVE — SIGNIFICANT CHANGE UP
BUN SERPL-MCNC: 27 MG/DL — HIGH (ref 10–20)
CALCIUM SERPL-MCNC: 9.5 MG/DL — SIGNIFICANT CHANGE UP (ref 8.5–10.1)
CHLORIDE SERPL-SCNC: 103 MMOL/L — SIGNIFICANT CHANGE UP (ref 98–110)
CO2 SERPL-SCNC: 27 MMOL/L — SIGNIFICANT CHANGE UP (ref 17–32)
COLOR SPEC: YELLOW — SIGNIFICANT CHANGE UP
CREAT SERPL-MCNC: 0.9 MG/DL — SIGNIFICANT CHANGE UP (ref 0.7–1.5)
DIFF PNL FLD: NEGATIVE — SIGNIFICANT CHANGE UP
GLUCOSE SERPL-MCNC: 88 MG/DL — SIGNIFICANT CHANGE UP (ref 70–99)
GLUCOSE UR QL: NEGATIVE MG/DL — SIGNIFICANT CHANGE UP
KETONES UR-MCNC: NEGATIVE — SIGNIFICANT CHANGE UP
LEUKOCYTE ESTERASE UR-ACNC: ABNORMAL
NITRITE UR-MCNC: NEGATIVE — SIGNIFICANT CHANGE UP
PH UR: 6.5 — SIGNIFICANT CHANGE UP (ref 5–8)
POTASSIUM SERPL-MCNC: 5 MMOL/L — SIGNIFICANT CHANGE UP (ref 3.5–5)
POTASSIUM SERPL-SCNC: 5 MMOL/L — SIGNIFICANT CHANGE UP (ref 3.5–5)
PROT SERPL-MCNC: 6.9 G/DL — SIGNIFICANT CHANGE UP (ref 6–8)
PROT UR-MCNC: NEGATIVE MG/DL — SIGNIFICANT CHANGE UP
SODIUM SERPL-SCNC: 142 MMOL/L — SIGNIFICANT CHANGE UP (ref 135–146)
SP GR SPEC: 1.01 — SIGNIFICANT CHANGE UP (ref 1.01–1.03)
TROPONIN T SERPL-MCNC: <0.01 NG/ML — SIGNIFICANT CHANGE UP
TROPONIN T SERPL-MCNC: <0.01 NG/ML — SIGNIFICANT CHANGE UP
UROBILINOGEN FLD QL: 0.2 MG/DL — SIGNIFICANT CHANGE UP (ref 0.2–0.2)
WBC UR QL: ABNORMAL /HPF

## 2019-03-20 RX ORDER — FENTANYL CITRATE 50 UG/ML
1 INJECTION INTRAVENOUS
Qty: 0 | Refills: 0 | Status: DISCONTINUED | OUTPATIENT
Start: 2019-03-20 | End: 2019-03-20

## 2019-03-20 RX ORDER — CARBIDOPA AND LEVODOPA 25; 100 MG/1; MG/1
1 TABLET ORAL ONCE
Qty: 0 | Refills: 0 | Status: COMPLETED | OUTPATIENT
Start: 2019-03-20 | End: 2019-03-20

## 2019-03-20 RX ORDER — MORPHINE SULFATE 50 MG/1
30 CAPSULE, EXTENDED RELEASE ORAL ONCE
Qty: 0 | Refills: 0 | Status: DISCONTINUED | OUTPATIENT
Start: 2019-03-20 | End: 2019-03-20

## 2019-03-20 RX ADMIN — MORPHINE SULFATE 30 MILLIGRAM(S): 50 CAPSULE, EXTENDED RELEASE ORAL at 12:11

## 2019-03-20 RX ADMIN — MORPHINE SULFATE 30 MILLIGRAM(S): 50 CAPSULE, EXTENDED RELEASE ORAL at 09:09

## 2019-03-20 NOTE — ED CDU PROVIDER INITIAL DAY NOTE - OBJECTIVE STATEMENT
77y/o female with pmh of arthritis, rheumatoid arthritis, fibromyalgia, pt. was placed in obs under syncope protocol. as per , last night around 10pm, he was trying to wake up his wife to ask if the dogs were fed. his wife was sleeping and he could not wake her up for 10 mins. when pt. woke up ems came to the house and pt. was flushed and sweaty. denies ha, cp, sob, nausea, vomiting, no post ictal period. no cardiologist

## 2019-03-20 NOTE — ED ADULT NURSE NOTE - CHPI ED NUR SYMPTOMS NEG
no dizziness/no weakness/no fever/no vomiting/no confusion/no loss of consciousness/no nausea/no numbness/no blurred vision

## 2019-03-20 NOTE — ED ADULT NURSE NOTE - OBJECTIVE STATEMENT
Patient received in spot 18 A in stable condition, awake, alert and oriented x 4, coherent speech, PERRLA, no neuro deficit noted, S1, S2 regular, abdomen is soft and nontender, 18 gauge saline lock placed on right AC, blood drawn and sent. Safety measures maintained. Will follow up.

## 2019-03-20 NOTE — ED ADULT NURSE REASSESSMENT NOTE - NS ED NURSE REASSESS COMMENT FT1
Pt given morphine PO as per MD order. Pt and son advised by PA that patient is able to take her own home medication. Son came to RN reporting that patient took extra dose of morphine. MD Ceja made aware. Pt awake, alert, no distress noted, RR WNL and unlabored. Will hold fentanyl patch as per MD and son and monitor.

## 2019-03-20 NOTE — ED CDU PROVIDER DISPOSITION NOTE - ATTENDING CONTRIBUTION TO CARE
Patient brought into obs for possible syncope    On my exam  vss  gen- NAD, aaox3  card-rrr  lungs-ctab, no wheezing or rhonchi  abd-sntnd, no guarding or rebound  neuro- full str/sensation, cn ii-xii grossly intact, normal coordination    Pt w/ negative obs w/u, feeling well, comfortable w/ d/c and cards f/u

## 2019-03-20 NOTE — ED CDU PROVIDER DISPOSITION NOTE - CLINICAL COURSE
Patient placed in obs under syncope protocol after ed w/u w/ negative labs, cth, ekg  CE negative x2, ekg nonischaemic x2, echo negative

## 2019-03-20 NOTE — ED ADULT NURSE REASSESSMENT NOTE - NS ED NURSE REASSESS COMMENT FT1
Pt awake, alert, no distress noted. Denies pain or discomfort.  at bedside. VS WNL. Awaiting results

## 2019-03-20 NOTE — ED ADULT NURSE NOTE - ED STAT RN HANDOFF DETAILS
Report is given to SHARON Rodriguez. At this time, patient is sitting in chair in room, alert and oriented x 4. Patient is on observation, pending stress echo. She is in stable condition.

## 2019-03-20 NOTE — ED ADULT NURSE NOTE - NSIMPLEMENTINTERV_GEN_ALL_ED
Implemented All Universal Safety Interventions:  [place] to call system. Call bell, personal items and telephone within reach. Instruct patient to call for assistance. Room bathroom lighting operational. Non-slip footwear when patient is off stretcher. Physically safe environment: no spills, clutter or unnecessary equipment. Stretcher in lowest position, wheels locked, appropriate side rails in place.

## 2019-03-20 NOTE — ED CDU PROVIDER DISPOSITION NOTE - CARE PROVIDER_API CALL
Martin Lloyd (MD)  Cardiovascular Disease; Internal Medicine; Interventional Cardiology  [address] [place] [address]  Phone: [phone]  Fax: [phone]  Follow Up Time: 1-3 Days    Your primary care provider,   Phone: (   )    -  Fax: (   )    -  Follow Up Time: 1-3 Days

## 2019-03-20 NOTE — ED CDU PROVIDER DISPOSITION NOTE - PROVIDER TOKENS
PROVIDER:[TOKEN:[[id number]],FOLLOWUP:[1-3 Days]],FREE:[LAST:[Your primary care provider],PHONE:[(   )    -],FAX:[(   )    -],FOLLOWUP:[1-3 Days]]

## 2019-03-20 NOTE — ED ADULT NURSE REASSESSMENT NOTE - NS ED NURSE REASSESS COMMENT FT1
Pt received from previous RN. Pt sitting up in chair, resting comfortably, no distress noted, A&Ox3. Denies pain or discomfort. Pt requesting parkinson's medication. Will make MD aware. Awaiting echo. Will continue to observe

## 2019-03-20 NOTE — ED ADULT NURSE REASSESSMENT NOTE - NS ED NURSE REASSESS COMMENT FT1
Pt on continuous cardiac monitoring and pulse ox. Resting comfortably, no distress noted. RR, o2 saturation WNL. Son at bedside

## 2019-03-20 NOTE — ED PROVIDER NOTE - OBJECTIVE STATEMENT
78 year old female, pmhx as stated in the chart, takes fentanyl patches for fibromyalgia, comes in with syncope, as per family, they were unable to wake the patient up and were forcefully shaking her to wake up, patient sattes she woke up and saw everyone in the house. no cp/sob, no n/v/d, no loc, no fever

## 2019-03-20 NOTE — ED ADULT NURSE NOTE - CAS TRG GEN SKIN CONDITION
[facility]    [address]    [facility] [address]    Phone:  [phone]       Thank You for choosing us for your health care visit. We are glad to serve you and happy to provide you with this summary of your visit. Please help us to ensure we have accurate records. If you find anything that needs to be changed, please let our staff know as soon as possible.          Your Demographic Information     Patient Name Sex     Karolyn Curry Female 3/10/1923       Ethnic Group Patient Race    Not of  or  Origin Unknown      Your Visit Details     Date & Time Provider Department    2017 12:00 PM [facility]      Your Upcoming Appointment*(Max 10)     2017  1:15 PM CST   Routine Eye Exam with MD Jessi Martinez [facility] ([facility], [address] )    [address] [facility] [address]   [phone]            2017 10:40 AM CDT   Follow-up Visit with Candi Felipe MD   [facility] [address]    [address] [facility] [facility] [address]   [phone]              Your Vitals Were     Pulse Weight SpO2 BMI Smoking Status       78 139 lb (63 kg) 84% 27.15 kg/m2 Former Smoker       Medications Prescribed or Re-Ordered Today     tiotropium (SPIRIVA HANDIHALER) 18 MCG capsule for inhaler    Sig - Route: Place 1 capsule into inhaler and inhale daily. - Inhalation    Class: Script Not Printed      Your Current Medications Are        Disp Refills Start End    tiotropium (SPIRIVA HANDIHALER) 18 MCG capsule for inhaler 90 capsule 1 2017     Sig - Route: Place 1 capsule into inhaler and inhale daily. - Inhalation    Class: Script Not Printed    ondansetron (ZOFRAN ODT) 4 MG disintegrating tablet 20 tablet 0 2017     Sig - Route: Take 1 tablet by mouth  every 8 hours as needed for Nausea. - Oral    Class: Eprescribe    guaiFENesin-codeine (CHERATUSSIN AC) 100-10 MG/5ML liquid 120 mL 0 1/4/2017     Sig - Route: Take 10 mLs by mouth 4 times daily as needed for Cough. - Oral    Class: Script Not Printed    mirtazapine (REMERON) 15 MG tablet 30 tablet 2 12/14/2016     Sig - Route: Take 1 tablet by mouth nightly. - Oral    Class: Eprescribe    lisinopril-hydrochlorothiazide (PRINZIDE,ZESTORETIC) 20-25 MG per tablet 90 tablet 0 11/21/2016     Sig: Take 1 tablet by mouth  daily    Class: Eprescribe    alendronate (FOSAMAX) 70 MG tablet 12 tablet 1 9/6/2016     Sig: Take 1 tablet by mouth  every 7 days    Class: Eprescribe    lovastatin (MEVACOR) 40 MG tablet 90 tablet 5 2/8/2016     Sig: Take 1 tablet by mouth  daily    Class: Eprescribe    gabapentin (NEURONTIN) 300 MG capsule 30 capsule 1 12/11/2015     Sig - Route: Take 1 capsule by mouth nightly. - Oral    Class: Eprescribe    naproxen (NAPROSYN) 375 MG tablet 30 tablet 1 7/23/2015     Sig - Route: Take 1 tablet by mouth 2 times daily. - Oral    Class: Eprescribe    pantoprazole (PROTONIX) 40 MG tablet 30 tablet 2 6/11/2015     Sig - Route: Take 1 tablet by mouth daily. - Oral    Class: Eprescribe    albuterol (PROVENTIL HFA) 108 (90 BASE) MCG/ACT inhaler 1 Inhaler 3 12/23/2014 1/25/2017    Sig - Route: Inhale 2 puffs into the lungs every 4 hours as needed (wheezing). - Inhalation    Class: Script Not Printed    clotrimazole (LOTRIMIN) 1 % cream 30 g 2 12/11/2014     Sig - Route: Apply 1 application topically 2 times daily. - Topical    Class: Eprescribe    triamcinolone (ARISTOCORT) 0.1 % cream 30 g 1 6/11/2014     Sig - Route: Apply 1 application topically 2 times daily. - Topical    Class: Eprescribe    ICAPS LUTEIN-ZEAXANTHIN PO TBCR 0 0 12/12/2005     Sig - Route: 1 tablet BID - Oral    Class: Historical Med    ASPIRIN CHILD 81 MG PO CHEW 0 0 9/1/2005     Sig - Route: 1 TABLET DAILY - Oral    Class: Historical  Med      Allergies     No Known Allergies      Immunizations History as of 1/25/2017     Name Date    HERPES ZOSTER SHINGLES 2/23/2010    INFLUENZA QUADRIVALENT 10/13/2014    Influenza 11/5/2016, 11/20/2015, 10/23/2013, 10/18/2012, 9/26/2011, 10/11/2005, 10/12/2004, 10/14/2002, 11/27/1989, 11/18/1988    Pneumococcal Conjugate 10/13/2004    Pneumococcal Conjugate 13 Valent 12/11/2015    Pneumococcal Polysaccharide Adult 12/14/2016, 1/1/1999      Problem List as of 1/25/2017     Essential hypertension, benign    PC OPACITY OD    PSEUDOPHAKIA OU S/P YAG OS    Osteoporosis, unspecified    Female stress incontinence    PVD (peripheral vascular disease)    HYPERLIPIDEMIA    COPD with emphysema    AMD (age-related macular degeneration), bilateral            Patient Instructions     None       Warm

## 2019-03-20 NOTE — ED CDU PROVIDER INITIAL DAY NOTE - MEDICAL DECISION MAKING DETAILS
Will observe on telemetry.  IVF given.  Symptoms improved.  Hold opioid meds as possible medication interaction/misdosing.

## 2019-04-08 ENCOUNTER — APPOINTMENT (OUTPATIENT)
Dept: CARDIOLOGY | Facility: CLINIC | Age: 79
End: 2019-04-08
Payer: MEDICARE

## 2019-04-08 VITALS
BODY MASS INDEX: 26.31 KG/M2 | WEIGHT: 134 LBS | HEIGHT: 60 IN | DIASTOLIC BLOOD PRESSURE: 64 MMHG | HEART RATE: 86 BPM | SYSTOLIC BLOOD PRESSURE: 114 MMHG

## 2019-04-08 DIAGNOSIS — J44.9 CHRONIC OBSTRUCTIVE PULMONARY DISEASE, UNSPECIFIED: ICD-10-CM

## 2019-04-08 DIAGNOSIS — M19.90 UNSPECIFIED OSTEOARTHRITIS, UNSPECIFIED SITE: ICD-10-CM

## 2019-04-08 DIAGNOSIS — Z87.39 PERSONAL HISTORY OF OTHER DISEASES OF THE MUSCULOSKELETAL SYSTEM AND CONNECTIVE TISSUE: ICD-10-CM

## 2019-04-08 DIAGNOSIS — Z86.69 PERSONAL HISTORY OF OTHER DISEASES OF THE NERVOUS SYSTEM AND SENSE ORGANS: ICD-10-CM

## 2019-04-08 DIAGNOSIS — Z78.9 OTHER SPECIFIED HEALTH STATUS: ICD-10-CM

## 2019-04-08 DIAGNOSIS — Z86.39 PERSONAL HISTORY OF OTHER ENDOCRINE, NUTRITIONAL AND METABOLIC DISEASE: ICD-10-CM

## 2019-04-08 PROCEDURE — 93000 ELECTROCARDIOGRAM COMPLETE: CPT

## 2019-04-08 PROCEDURE — 99204 OFFICE O/P NEW MOD 45 MIN: CPT

## 2019-04-08 RX ORDER — MAGNESIUM 200 MG
200 TABLET ORAL DAILY
Refills: 0 | Status: ACTIVE | COMMUNITY

## 2019-04-08 RX ORDER — PNV NO.95/FERROUS FUM/FOLIC AC 28MG-0.8MG
TABLET ORAL DAILY
Refills: 0 | Status: ACTIVE | COMMUNITY

## 2019-04-08 RX ORDER — LEVOTHYROXINE SODIUM 75 UG/1
75 TABLET ORAL DAILY
Refills: 0 | Status: ACTIVE | COMMUNITY

## 2019-04-08 RX ORDER — MONTELUKAST SODIUM 10 MG/1
10 TABLET, FILM COATED ORAL DAILY
Refills: 0 | Status: ACTIVE | COMMUNITY

## 2019-04-08 RX ORDER — TOCILIZUMAB 20 MG/ML
INJECTION, SOLUTION, CONCENTRATE INTRAVENOUS
Refills: 0 | Status: ACTIVE | COMMUNITY

## 2019-04-08 RX ORDER — MULTIVIT-MIN/FA/LYCOPEN/LUTEIN .4-300-25
TABLET ORAL DAILY
Refills: 0 | Status: ACTIVE | COMMUNITY

## 2019-04-08 RX ORDER — CHLORHEXIDINE GLUCONATE 4 %
400 LIQUID (ML) TOPICAL DAILY
Refills: 0 | Status: ACTIVE | COMMUNITY

## 2019-04-08 RX ORDER — CARBIDOPA AND LEVODOPA 25; 250 MG/1; MG/1
25-250 TABLET ORAL 4 TIMES DAILY
Refills: 0 | Status: ACTIVE | COMMUNITY

## 2019-04-08 RX ORDER — DENOSUMAB 60 MG/ML
INJECTION SUBCUTANEOUS
Refills: 0 | Status: ACTIVE | COMMUNITY

## 2019-04-08 RX ORDER — MORPHINE SULFATE 15 MG/1
15 TABLET ORAL DAILY
Refills: 0 | Status: ACTIVE | COMMUNITY

## 2019-04-08 RX ORDER — MORPHINE SULFATE 30 MG/1
30 TABLET ORAL DAILY
Refills: 0 | Status: ACTIVE | COMMUNITY

## 2019-04-08 RX ORDER — FENTANYL 12 UG/H
12 PATCH, EXTENDED RELEASE TRANSDERMAL
Refills: 0 | Status: ACTIVE | COMMUNITY

## 2019-04-08 RX ORDER — DOCUSATE SODIUM 50 MG/1
50 CAPSULE, LIQUID FILLED ORAL DAILY
Refills: 0 | Status: ACTIVE | COMMUNITY

## 2019-04-08 RX ORDER — IRON/IRON ASP GLY/FA/MV-MIN 38 125-25-1MG
TABLET ORAL TWICE DAILY
Refills: 0 | Status: ACTIVE | COMMUNITY

## 2019-04-08 RX ORDER — MILNACIPRAN HYDROCHLORIDE 25 MG/1
25 TABLET, FILM COATED ORAL TWICE DAILY
Refills: 0 | Status: ACTIVE | COMMUNITY

## 2019-04-08 RX ORDER — CETIRIZINE HYDROCHLORIDE 10 MG/1
10 CAPSULE, LIQUID FILLED ORAL DAILY
Refills: 0 | Status: ACTIVE | COMMUNITY

## 2019-04-08 NOTE — PHYSICAL EXAM
[General Appearance - Well Developed] : well developed [General Appearance - Well Nourished] : well nourished [No Deformities] : no deformities [General Appearance - In No Acute Distress] : no acute distress [Normal Conjunctiva] : the conjunctiva exhibited no abnormalities [Normal Oral Mucosa] : normal oral mucosa [Normal Oropharynx] : normal oropharynx [FreeTextEntry1] : no JVD [] : no respiratory distress [Respiration, Rhythm And Depth] : normal respiratory rhythm and effort [Auscultation Breath Sounds / Voice Sounds] : lungs were clear to auscultation bilaterally [Heart Rate And Rhythm] : heart rate and rhythm were normal [Heart Sounds] : normal S1 and S2 [Murmurs] : no murmurs present [Edema] : no peripheral edema present [Bowel Sounds] : normal bowel sounds [Abdomen Soft] : soft [Abdomen Tenderness] : non-tender [Abnormal Walk] : normal gait [Nail Clubbing] : no clubbing of the fingernails [Cyanosis, Localized] : no localized cyanosis [Skin Color & Pigmentation] : normal skin color and pigmentation [Oriented To Time, Place, And Person] : oriented to person, place, and time

## 2019-04-08 NOTE — ASSESSMENT
[FreeTextEntry1] : Syncope.\par Obtain Holter to rule out underlying arrhythmia.\par Records from the hospital reviewed.\par Minimize the use of opioids\par Hydration recommended\par Pulmonary f/u for DWAYNE.\par F/u after Holter

## 2019-04-08 NOTE — HISTORY OF PRESENT ILLNESS
[FreeTextEntry1] : 77 y/o female with history of fibromyalgia, DJD, DWAYNE, COPD, thyroid disease, who was evaluated in the [facility] ED for syncope. The patient reportedly fell asleep watching TV and her  could not wake her up, with 10 minutes of shaking. He called 911 and when she came to the paramedics were there. the patient had no confusion, incontinence, chest pain, dizziness or nausea. She ruled ot, had an echo, which revealed normal LV function and was discharged. The patient is on multiple pain medications, including fentanyl patch and morphine. She has been diagnosed with DWAYNE, but is non-compliant with CPAP.

## 2019-04-08 NOTE — REVIEW OF SYSTEMS
[Feeling Fatigued] : feeling fatigued [see HPI] : see HPI [Abdominal Pain] : abdominal pain [Joint Pain] : joint pain [Joint Stiffness] : joint stiffness [Muscle Cramps] : muscle cramps [Tremor] : a tremor was seen [Negative] : Heme/Lymph

## 2019-04-09 ENCOUNTER — APPOINTMENT (OUTPATIENT)
Dept: CARDIOLOGY | Facility: CLINIC | Age: 79
End: 2019-04-09
Payer: MEDICARE

## 2019-04-09 PROCEDURE — 93224 XTRNL ECG REC UP TO 48 HRS: CPT

## 2019-05-15 ENCOUNTER — APPOINTMENT (OUTPATIENT)
Dept: CARDIOLOGY | Facility: CLINIC | Age: 79
End: 2019-05-15
Payer: MEDICARE

## 2019-05-15 VITALS — DIASTOLIC BLOOD PRESSURE: 68 MMHG | SYSTOLIC BLOOD PRESSURE: 128 MMHG

## 2019-05-15 VITALS — WEIGHT: 134 LBS | BODY MASS INDEX: 26.17 KG/M2

## 2019-05-15 DIAGNOSIS — R55 SYNCOPE AND COLLAPSE: ICD-10-CM

## 2019-05-15 PROCEDURE — 93000 ELECTROCARDIOGRAM COMPLETE: CPT

## 2019-05-15 PROCEDURE — 99213 OFFICE O/P EST LOW 20 MIN: CPT

## 2019-05-15 NOTE — REVIEW OF SYSTEMS
[Feeling Fatigued] : feeling fatigued [Joint Pain] : joint pain [Abdominal Pain] : abdominal pain [see HPI] : see HPI [Joint Stiffness] : joint stiffness [Muscle Cramps] : muscle cramps [Tremor] : a tremor was seen [Negative] : Heme/Lymph

## 2019-05-15 NOTE — ASSESSMENT
[FreeTextEntry1] : Syncope.\par Negative Holter .\par Normal echo.\par No further events.\par Minimize the use of opioids\par Hydration recommended\par Pulmonary f/u for DWAYNE.\par F/u in 6 months or PRN

## 2019-05-15 NOTE — PHYSICAL EXAM
[General Appearance - Well Nourished] : well nourished [No Deformities] : no deformities [General Appearance - Well Developed] : well developed [General Appearance - In No Acute Distress] : no acute distress [Normal Conjunctiva] : the conjunctiva exhibited no abnormalities [Normal Oral Mucosa] : normal oral mucosa [FreeTextEntry1] : no JVD [Normal Oropharynx] : normal oropharynx [Heart Rate And Rhythm] : heart rate and rhythm were normal [Respiration, Rhythm And Depth] : normal respiratory rhythm and effort [Auscultation Breath Sounds / Voice Sounds] : lungs were clear to auscultation bilaterally [] : no respiratory distress [Heart Sounds] : normal S1 and S2 [Murmurs] : no murmurs present [Edema] : no peripheral edema present [Abdomen Soft] : soft [Bowel Sounds] : normal bowel sounds [Abnormal Walk] : normal gait [Nail Clubbing] : no clubbing of the fingernails [Abdomen Tenderness] : non-tender [Cyanosis, Localized] : no localized cyanosis [Oriented To Time, Place, And Person] : oriented to person, place, and time [Skin Color & Pigmentation] : normal skin color and pigmentation

## 2020-04-13 NOTE — PROGRESS NOTE ADULT - I WAS PHYSICALLY PRESENT FOR THE KEY PORTIONS OF THE EVALUATION AND MANAGEMENT (E/M) SERVICE PROVIDED.  I AGREE WITH THE ABOVE HISTORY, PHYSICAL, AND PLAN WHICH I HAVE REVIEWED AND EDITED WHERE APPROPRIATE
Statement Selected
Partial Purse String (Simple) Text: Given the location of the defect and the characteristics of the surrounding skin a simple purse string closure was deemed most appropriate.  Undermining was performed circumferentially around the surgical defect.  A purse string suture was then placed and tightened. Wound tension of the circular defect prevented complete closure of the wound.

## 2020-04-14 NOTE — SWALLOW FEES ASSESSMENT ADULT - SLP PERTINENT HISTORY OF CURRENT PROBLEM
If you are a smoker, it is important for your health to stop smoking. Please be aware that second hand smoke is also harmful.
h/o aspiration event, workup for possible parkinsons diagnosis, functional decline since kyphoplasty several weeks ago

## 2020-12-02 NOTE — CONSULT NOTE ADULT - CONSULT REQUESTED DATE/TIME
called Jorge because he got his MRA completed today and does not have any follow up with Dr Ibanez currently scheduled. He was supposed to have follow up visit with Dr Ibanez in October but cancelled d/t being positive for Covid-19.  offered Jorge an appt tomorrow at 1200 but he is unable to make this. He was scheduled on 1/20/21 and if an opening comes up sooner writer will call him. He is fine waiting for results of MRA until his appt.   
07-Apr-2018 12:58
09-Apr-2018 11:20
10-Apr-2018
13-Apr-2018 15:11

## 2021-03-13 NOTE — H&P PST ADULT - PMH
Fibromyalgia    GERD (gastroesophageal reflux disease)    Hypothyroidism  no recent dosage changes  Obesity  s/p gastric bypass ~15 yrs ago ~100 lb loss  DWAYNE on CPAP    Osteoarthritis    Psoriatic arthritis    Rheumatoid arthritis    Seasonal allergies    Tremors of nervous system  essential tremors
Statement Selected

## 2022-11-23 NOTE — DISCHARGE NOTE ADULT - REASON FOR ADMISSION
Compression Fx's Calcipotriene Counseling: Cantharidin Counseling:  I discussed with the patient the risks of Cantharidin including but not limited to pain, redness, burning, itching, and blistering.

## 2022-12-09 ENCOUNTER — NON-APPOINTMENT (OUTPATIENT)
Age: 82
End: 2022-12-09

## 2022-12-13 NOTE — PATIENT PROFILE ADULT. - TEACHING/LEARNING FACTORS IMPACT ABILITY TO LEARN
Department of Emergency Medicine  FIRST PROVIDER ELOPEMENT NOTE                 Independent MLP          12/13/22  9:42 AM EST    MRN: [id number]    HPI: Talat Kamara is a 66 y.o. female who presents to the ED with the following complaint: Altered Mental Status (Since yesterday )      (Please refer to [address] [place] [address] for pertinent ROS and PE documentation)  Labs:  No results found for this visit on 12/13/22. Imaging: All Radiology results interpreted by Radiologist unless otherwise noted. No orders to display     ED Course    Medications - No data to display    Patient's vitals were rechecked and she was 95% on room air with a prior blood pressure 121/57. Pulse was 92 and respirations were 16  -------------------------  Disposition    Patient    from the department prior to completion of evaluation after triage. Results of triage orders that were completed at time of elopement as indicated above were reviewed.     Diagnosis at Time of Elopement: (Based on presenting complaint/available test results):   Multiple falls    Electronically signed by Siria Ac PA-C   DD: 12/13/22       Siria Ac PA-C  12/13/22 0675
Department of Emergency Medicine  FIRST PROVIDER TRIAGE NOTE             Independent MLP           12/13/22  9:33 AM EST    Date of Encounter: 12/13/22   MRN: [id number]      HPI: Manolo Miles is a 66 y.o. female who presents to the ED for No chief complaint on file. Patient had a history of multiple falls since yesterday. Patient is altered. Patient states that she also has associated nausea. ROS: Negative for abd pain, back pain, fever, cough, or vomiting. PE: Gen Appearance/Constitutional: alert  HEENT: NC/NT. PERRLA,  Airway patent. Neck: supple     Initial Plan of Care: All treatment areas with department are currently occupied. Plan to order/Initiate the following while awaiting opening in ED: labs, EKG, and imaging studies.   Initiate Treatment-Testing, Proceed toTreatment Area When Bed Available for ED Attending/MLP to Continue Care    Electronically signed by Catherine Reynolds PA-C   DD: 12/13/22       Catherine Reynolds PA-C  12/13/22 [id number]
none

## 2023-01-06 NOTE — DISCHARGE NOTE ADULT - PROVIDER TOKENS
[FreeTextEntry1] : HTN\par \par HLD\par \par Palps
TOKEN:'[id number]:'[id number]:'[id number]'

## 2023-02-09 ENCOUNTER — INPATIENT (INPATIENT)
Facility: HOSPITAL | Age: 83
LOS: 2 days | Discharge: HOME | DRG: 56 | End: 2023-02-12
Attending: INTERNAL MEDICINE | Admitting: STUDENT IN AN ORGANIZED HEALTH CARE EDUCATION/TRAINING PROGRAM
Payer: MEDICARE

## 2023-02-09 VITALS
HEART RATE: 128 BPM | OXYGEN SATURATION: 93 % | WEIGHT: 169.09 LBS | DIASTOLIC BLOOD PRESSURE: 82 MMHG | SYSTOLIC BLOOD PRESSURE: 151 MMHG | TEMPERATURE: 99 F

## 2023-02-09 DIAGNOSIS — M21.969 UNSPECIFIED ACQUIRED DEFORMITY OF UNSPECIFIED LOWER LEG: Chronic | ICD-10-CM

## 2023-02-09 DIAGNOSIS — R41.82 ALTERED MENTAL STATUS, UNSPECIFIED: ICD-10-CM

## 2023-02-09 DIAGNOSIS — Z98.84 BARIATRIC SURGERY STATUS: Chronic | ICD-10-CM

## 2023-02-09 LAB
ALBUMIN SERPL ELPH-MCNC: 4.4 G/DL — SIGNIFICANT CHANGE UP (ref 3.5–5.2)
ALP SERPL-CCNC: 83 U/L — SIGNIFICANT CHANGE UP (ref 30–115)
ALT FLD-CCNC: 25 U/L — SIGNIFICANT CHANGE UP (ref 0–41)
ANION GAP SERPL CALC-SCNC: 12 MMOL/L — SIGNIFICANT CHANGE UP (ref 7–14)
APPEARANCE UR: CLEAR — SIGNIFICANT CHANGE UP
AST SERPL-CCNC: 113 U/L — HIGH (ref 0–41)
BACTERIA # UR AUTO: NEGATIVE — SIGNIFICANT CHANGE UP
BASOPHILS # BLD AUTO: 0.04 K/UL — SIGNIFICANT CHANGE UP (ref 0–0.2)
BASOPHILS NFR BLD AUTO: 0.3 % — SIGNIFICANT CHANGE UP (ref 0–1)
BILIRUB SERPL-MCNC: 0.9 MG/DL — SIGNIFICANT CHANGE UP (ref 0.2–1.2)
BILIRUB UR-MCNC: NEGATIVE — SIGNIFICANT CHANGE UP
BUN SERPL-MCNC: 52 MG/DL — HIGH (ref 10–20)
CALCIUM SERPL-MCNC: 9.9 MG/DL — SIGNIFICANT CHANGE UP (ref 8.4–10.4)
CHLORIDE SERPL-SCNC: 97 MMOL/L — LOW (ref 98–110)
CK SERPL-CCNC: 97 U/L — SIGNIFICANT CHANGE UP (ref 0–225)
CO2 SERPL-SCNC: 27 MMOL/L — SIGNIFICANT CHANGE UP (ref 17–32)
COLOR SPEC: SIGNIFICANT CHANGE UP
CREAT SERPL-MCNC: 1 MG/DL — SIGNIFICANT CHANGE UP (ref 0.7–1.5)
DIFF PNL FLD: NEGATIVE — SIGNIFICANT CHANGE UP
EGFR: 57 ML/MIN/1.73M2 — LOW
EOSINOPHIL # BLD AUTO: 0.01 K/UL — SIGNIFICANT CHANGE UP (ref 0–0.7)
EOSINOPHIL NFR BLD AUTO: 0.1 % — SIGNIFICANT CHANGE UP (ref 0–8)
EPI CELLS # UR: 1 /HPF — SIGNIFICANT CHANGE UP (ref 0–5)
FLUAV AG NPH QL: SIGNIFICANT CHANGE UP
FLUBV AG NPH QL: SIGNIFICANT CHANGE UP
GLUCOSE SERPL-MCNC: 119 MG/DL — HIGH (ref 70–99)
GLUCOSE UR QL: NEGATIVE — SIGNIFICANT CHANGE UP
HCT VFR BLD CALC: 39.3 % — SIGNIFICANT CHANGE UP (ref 37–47)
HGB BLD-MCNC: 13.3 G/DL — SIGNIFICANT CHANGE UP (ref 12–16)
HYALINE CASTS # UR AUTO: 0 /LPF — SIGNIFICANT CHANGE UP (ref 0–7)
IMM GRANULOCYTES NFR BLD AUTO: 0.5 % — HIGH (ref 0.1–0.3)
KETONES UR-MCNC: NEGATIVE — SIGNIFICANT CHANGE UP
LACTATE SERPL-SCNC: 1.9 MMOL/L — SIGNIFICANT CHANGE UP (ref 0.7–2)
LEUKOCYTE ESTERASE UR-ACNC: ABNORMAL
LYMPHOCYTES # BLD AUTO: 0.74 K/UL — LOW (ref 1.2–3.4)
LYMPHOCYTES # BLD AUTO: 4.8 % — LOW (ref 20.5–51.1)
MAGNESIUM SERPL-MCNC: 2.1 MG/DL — SIGNIFICANT CHANGE UP (ref 1.8–2.4)
MCHC RBC-ENTMCNC: 31.7 PG — HIGH (ref 27–31)
MCHC RBC-ENTMCNC: 33.8 G/DL — SIGNIFICANT CHANGE UP (ref 32–37)
MCV RBC AUTO: 93.8 FL — SIGNIFICANT CHANGE UP (ref 81–99)
MONOCYTES # BLD AUTO: 0.62 K/UL — HIGH (ref 0.1–0.6)
MONOCYTES NFR BLD AUTO: 4 % — SIGNIFICANT CHANGE UP (ref 1.7–9.3)
NEUTROPHILS # BLD AUTO: 14.06 K/UL — HIGH (ref 1.4–6.5)
NEUTROPHILS NFR BLD AUTO: 90.3 % — HIGH (ref 42.2–75.2)
NITRITE UR-MCNC: NEGATIVE — SIGNIFICANT CHANGE UP
NRBC # BLD: 0 /100 WBCS — SIGNIFICANT CHANGE UP (ref 0–0)
PH UR: 6 — SIGNIFICANT CHANGE UP (ref 5–8)
PLATELET # BLD AUTO: 254 K/UL — SIGNIFICANT CHANGE UP (ref 130–400)
POTASSIUM SERPL-MCNC: 4.3 MMOL/L — SIGNIFICANT CHANGE UP (ref 3.5–5)
POTASSIUM SERPL-SCNC: 4.3 MMOL/L — SIGNIFICANT CHANGE UP (ref 3.5–5)
PROT SERPL-MCNC: 7.4 G/DL — SIGNIFICANT CHANGE UP (ref 6–8)
PROT UR-MCNC: NEGATIVE — SIGNIFICANT CHANGE UP
RBC # BLD: 4.19 M/UL — LOW (ref 4.2–5.4)
RBC # FLD: 14 % — SIGNIFICANT CHANGE UP (ref 11.5–14.5)
RBC CASTS # UR COMP ASSIST: 1 /HPF — SIGNIFICANT CHANGE UP (ref 0–4)
RSV RNA NPH QL NAA+NON-PROBE: SIGNIFICANT CHANGE UP
SARS-COV-2 RNA SPEC QL NAA+PROBE: SIGNIFICANT CHANGE UP
SODIUM SERPL-SCNC: 136 MMOL/L — SIGNIFICANT CHANGE UP (ref 135–146)
SP GR SPEC: 1.02 — SIGNIFICANT CHANGE UP (ref 1.01–1.03)
TROPONIN T SERPL-MCNC: <0.01 NG/ML — SIGNIFICANT CHANGE UP
UROBILINOGEN FLD QL: SIGNIFICANT CHANGE UP
WBC # BLD: 15.55 K/UL — HIGH (ref 4.8–10.8)
WBC # FLD AUTO: 15.55 K/UL — HIGH (ref 4.8–10.8)
WBC UR QL: 3 /HPF — SIGNIFICANT CHANGE UP (ref 0–5)

## 2023-02-09 PROCEDURE — 83036 HEMOGLOBIN GLYCOSYLATED A1C: CPT

## 2023-02-09 PROCEDURE — 83735 ASSAY OF MAGNESIUM: CPT

## 2023-02-09 PROCEDURE — 84443 ASSAY THYROID STIM HORMONE: CPT

## 2023-02-09 PROCEDURE — 71045 X-RAY EXAM CHEST 1 VIEW: CPT

## 2023-02-09 PROCEDURE — 82607 VITAMIN B-12: CPT

## 2023-02-09 PROCEDURE — 36415 COLL VENOUS BLD VENIPUNCTURE: CPT

## 2023-02-09 PROCEDURE — 97162 PT EVAL MOD COMPLEX 30 MIN: CPT | Mod: GP

## 2023-02-09 PROCEDURE — 80061 LIPID PANEL: CPT

## 2023-02-09 PROCEDURE — 82746 ASSAY OF FOLIC ACID SERUM: CPT

## 2023-02-09 PROCEDURE — 84100 ASSAY OF PHOSPHORUS: CPT

## 2023-02-09 PROCEDURE — 81001 URINALYSIS AUTO W/SCOPE: CPT

## 2023-02-09 PROCEDURE — 71045 X-RAY EXAM CHEST 1 VIEW: CPT | Mod: 26

## 2023-02-09 PROCEDURE — 95819 EEG AWAKE AND ASLEEP: CPT

## 2023-02-09 PROCEDURE — 80053 COMPREHEN METABOLIC PANEL: CPT

## 2023-02-09 PROCEDURE — 85379 FIBRIN DEGRADATION QUANT: CPT

## 2023-02-09 PROCEDURE — 85025 COMPLETE CBC W/AUTO DIFF WBC: CPT

## 2023-02-09 PROCEDURE — 70450 CT HEAD/BRAIN W/O DYE: CPT | Mod: 26,MA

## 2023-02-09 PROCEDURE — 87086 URINE CULTURE/COLONY COUNT: CPT

## 2023-02-09 RX ORDER — CEFTRIAXONE 500 MG/1
1000 INJECTION, POWDER, FOR SOLUTION INTRAMUSCULAR; INTRAVENOUS ONCE
Refills: 0 | Status: COMPLETED | OUTPATIENT
Start: 2023-02-09 | End: 2023-02-09

## 2023-02-09 RX ORDER — SODIUM CHLORIDE 9 MG/ML
2100 INJECTION, SOLUTION INTRAVENOUS ONCE
Refills: 0 | Status: COMPLETED | OUTPATIENT
Start: 2023-02-09 | End: 2023-02-09

## 2023-02-09 RX ORDER — AZITHROMYCIN 500 MG/1
500 TABLET, FILM COATED ORAL ONCE
Refills: 0 | Status: COMPLETED | OUTPATIENT
Start: 2023-02-09 | End: 2023-02-09

## 2023-02-09 RX ADMIN — CEFTRIAXONE 100 MILLIGRAM(S): 500 INJECTION, POWDER, FOR SOLUTION INTRAMUSCULAR; INTRAVENOUS at 20:47

## 2023-02-09 RX ADMIN — AZITHROMYCIN 255 MILLIGRAM(S): 500 TABLET, FILM COATED ORAL at 19:53

## 2023-02-09 RX ADMIN — SODIUM CHLORIDE 2100 MILLILITER(S): 9 INJECTION, SOLUTION INTRAVENOUS at 18:08

## 2023-02-09 NOTE — ED PROVIDER NOTE - NSICDXPASTMEDICALHX_GEN_ALL_CORE_FT
PAST MEDICAL HISTORY:  Fibromyalgia     GERD (gastroesophageal reflux disease)     Hypothyroidism no recent dosage changes    Obesity s/p gastric bypass ~15 yrs ago ~100 lb loss    DWAYNE on CPAP     Osteoarthritis     Psoriatic arthritis     Pulmonary embolism     Rheumatoid arthritis     Seasonal allergies     Tremors of nervous system essential tremors

## 2023-02-09 NOTE — ED PROVIDER NOTE - ATTENDING APP SHARED VISIT CONTRIBUTION OF CARE
82-year-old female history of fibromyalgia rheumatoid arthritis on methotrexate now presents with questionable seizure decreased p.o. intake not feeling well cough and low-grade temperature.    vss, low-grade temperature, tachycardia, thin female, nontoxic, well appearing, pink conj, anicteric, dry mucous membranes, neck supple, CTAB, RRR, equal radial pulses bilat, abd soft/nt/nd, no cva tend. no calves tend, no edema, no fnd. no rashes.

## 2023-02-09 NOTE — ED ADULT TRIAGE NOTE - CHIEF COMPLAINT QUOTE
BIBA from home for possible seizure. As per daughter pt was found on the bed in the bed shaking and soiled with red face. As per daughter pt looks like this when she has seizure. Hx seizures. Last seizure 10 years ago. Not taking meds forseizure. Pt is A,ox4 in triage.

## 2023-02-09 NOTE — ED PROVIDER NOTE - PHYSICAL EXAMINATION
Physical Exam    Vital Signs: I have reviewed the initial vital signs.  Constitutional: appears stated age, no acute distress  Eyes: Conjunctiva pink, Sclera clear, PERRLA, EOMI.  ENT: OP is clear without exudates, normal dentition, normal gingival, tongue without swelling or laceration  Cardiovascular: S1 and S2, regular rate, regular rhythm, well-perfused extremities, radial pulses equal and 2+, pedal pulses 2+ and equal  Respiratory: unlabored respiratory effort, mildly decreased breath sounds bilaterally, no wheezing, rales and rhonchi  Gastrointestinal: soft, non-tender abdomen, no pulsatile mass, normal bowl sounds  Musculoskeletal: supple neck, no lower extremity edema, no midline tenderness  Integumentary: warm, dry, no rash  Neurologic: awake, alert, oriented x2 (could remember month, but not year) cranial nerves II-XII grossly intact, extremities’ motor and sensory functions grossly intact  Psychiatric: appropriate mood, appropriate affect

## 2023-02-09 NOTE — ED PROVIDER NOTE - NS ED ATTENDING STATEMENT MOD
This was a shared visit with the NITHYA. I reviewed and verified the documentation and independently performed the documented:

## 2023-02-09 NOTE — ED PROVIDER NOTE - OBJECTIVE STATEMENT
82-year-old female with past medical history pulmonary embolism, DWAYNE on CPAP, obesity, OA, RA, seizures presents BIBA for possible seizure around 10 AM. Patient's daughter at bedside states a family friend who assists with taking care of patient arrived at patient's home at 10AM And found patient shaking in bed with red and face, diaphoretic.  Per daughter, patient has not had seizures in 10 years and is not currently taking any antiseizure meds. Patient endorses headache and feeling confused. Denies visual changes, chest pain, abdominal pain, n/v/d, urinary sx.

## 2023-02-09 NOTE — ED PROVIDER NOTE - PROGRESS NOTE DETAILS
Spoke with MAR, who requested Neurology consult. Will request that Neurology follow patient during admission. Spoke with MAR, who requested Neurology consult. Will request that Neurology evaluate patient.

## 2023-02-09 NOTE — ED PROVIDER NOTE - CLINICAL SUMMARY MEDICAL DECISION MAKING FREE TEXT BOX
Altered mental status.  Labs and imaging reviewed.  Chest x-ray with left-sided opacity.  IV antibiotics given.  Admit to inpatient unit.

## 2023-02-09 NOTE — ED PROVIDER NOTE - NS ED ROS FT
Constitutional: (+) fever, (-) chills  Eyes: (-) visual changes  ENT: (-) nasal congestion  Cardiovascular: (-) chest pain, (-) syncope  Respiratory: (-) cough, (-) shortness of breath, (-) dyspnea,   Gastrointestinal: (-) vomiting, (-) diarrhea, (-)nausea,  Musculoskeletal: (-) neck pain, (-) back pain, (-) joint pain,  Integumentary: (-) rash, (-) edema, (-) bruises  Neurological: (+) headache, (-) loc, (-) dizziness, (-) tingling, (-)numbness,  Peripheral Vascular: (-) leg swelling  :  (-)dysuria,  (-) hematuria  Allergic/Immunologic: (-) pruritus

## 2023-02-10 LAB
ALBUMIN SERPL ELPH-MCNC: 3.9 G/DL — SIGNIFICANT CHANGE UP (ref 3.5–5.2)
ALP SERPL-CCNC: 68 U/L — SIGNIFICANT CHANGE UP (ref 30–115)
ALT FLD-CCNC: 68 U/L — HIGH (ref 0–41)
ANION GAP SERPL CALC-SCNC: 12 MMOL/L — SIGNIFICANT CHANGE UP (ref 7–14)
AST SERPL-CCNC: 51 U/L — HIGH (ref 0–41)
BASOPHILS # BLD AUTO: 0.02 K/UL — SIGNIFICANT CHANGE UP (ref 0–0.2)
BASOPHILS NFR BLD AUTO: 0.2 % — SIGNIFICANT CHANGE UP (ref 0–1)
BILIRUB SERPL-MCNC: 1.2 MG/DL — SIGNIFICANT CHANGE UP (ref 0.2–1.2)
BUN SERPL-MCNC: 29 MG/DL — HIGH (ref 10–20)
CALCIUM SERPL-MCNC: 9 MG/DL — SIGNIFICANT CHANGE UP (ref 8.4–10.5)
CHLORIDE SERPL-SCNC: 99 MMOL/L — SIGNIFICANT CHANGE UP (ref 98–110)
CO2 SERPL-SCNC: 25 MMOL/L — SIGNIFICANT CHANGE UP (ref 17–32)
CREAT SERPL-MCNC: 0.9 MG/DL — SIGNIFICANT CHANGE UP (ref 0.7–1.5)
EGFR: 65 ML/MIN/1.73M2 — SIGNIFICANT CHANGE UP
EOSINOPHIL # BLD AUTO: 0.09 K/UL — SIGNIFICANT CHANGE UP (ref 0–0.7)
EOSINOPHIL NFR BLD AUTO: 0.8 % — SIGNIFICANT CHANGE UP (ref 0–8)
GLUCOSE SERPL-MCNC: 153 MG/DL — HIGH (ref 70–99)
HCT VFR BLD CALC: 35.2 % — LOW (ref 37–47)
HGB BLD-MCNC: 11.6 G/DL — LOW (ref 12–16)
IMM GRANULOCYTES NFR BLD AUTO: 0.4 % — HIGH (ref 0.1–0.3)
LYMPHOCYTES # BLD AUTO: 0.88 K/UL — LOW (ref 1.2–3.4)
LYMPHOCYTES # BLD AUTO: 8.2 % — LOW (ref 20.5–51.1)
MCHC RBC-ENTMCNC: 31.7 PG — HIGH (ref 27–31)
MCHC RBC-ENTMCNC: 33 G/DL — SIGNIFICANT CHANGE UP (ref 32–37)
MCV RBC AUTO: 96.2 FL — SIGNIFICANT CHANGE UP (ref 81–99)
MONOCYTES # BLD AUTO: 0.56 K/UL — SIGNIFICANT CHANGE UP (ref 0.1–0.6)
MONOCYTES NFR BLD AUTO: 5.2 % — SIGNIFICANT CHANGE UP (ref 1.7–9.3)
NEUTROPHILS # BLD AUTO: 9.12 K/UL — HIGH (ref 1.4–6.5)
NEUTROPHILS NFR BLD AUTO: 85.2 % — HIGH (ref 42.2–75.2)
NRBC # BLD: 0 /100 WBCS — SIGNIFICANT CHANGE UP (ref 0–0)
PLATELET # BLD AUTO: 212 K/UL — SIGNIFICANT CHANGE UP (ref 130–400)
POTASSIUM SERPL-MCNC: 4.3 MMOL/L — SIGNIFICANT CHANGE UP (ref 3.5–5)
POTASSIUM SERPL-SCNC: 4.3 MMOL/L — SIGNIFICANT CHANGE UP (ref 3.5–5)
PROT SERPL-MCNC: 6.2 G/DL — SIGNIFICANT CHANGE UP (ref 6–8)
RBC # BLD: 3.66 M/UL — LOW (ref 4.2–5.4)
RBC # FLD: 14.2 % — SIGNIFICANT CHANGE UP (ref 11.5–14.5)
SODIUM SERPL-SCNC: 136 MMOL/L — SIGNIFICANT CHANGE UP (ref 135–146)
WBC # BLD: 10.71 K/UL — SIGNIFICANT CHANGE UP (ref 4.8–10.8)
WBC # FLD AUTO: 10.71 K/UL — SIGNIFICANT CHANGE UP (ref 4.8–10.8)

## 2023-02-10 PROCEDURE — 99222 1ST HOSP IP/OBS MODERATE 55: CPT

## 2023-02-10 PROCEDURE — 99223 1ST HOSP IP/OBS HIGH 75: CPT

## 2023-02-10 RX ORDER — MAGNESIUM OXIDE 400 MG ORAL TABLET 241.3 MG
400 TABLET ORAL
Refills: 0 | Status: DISCONTINUED | OUTPATIENT
Start: 2023-02-10 | End: 2023-02-12

## 2023-02-10 RX ORDER — AZITHROMYCIN 500 MG/1
500 TABLET, FILM COATED ORAL EVERY 24 HOURS
Refills: 0 | Status: DISCONTINUED | OUTPATIENT
Start: 2023-02-10 | End: 2023-02-10

## 2023-02-10 RX ORDER — FERROUS SULFATE 325(65) MG
325 TABLET ORAL DAILY
Refills: 0 | Status: DISCONTINUED | OUTPATIENT
Start: 2023-02-10 | End: 2023-02-12

## 2023-02-10 RX ORDER — RASAGILINE 0.5 MG/1
1 TABLET ORAL
Qty: 0 | Refills: 0 | DISCHARGE

## 2023-02-10 RX ORDER — MORPHINE SULFATE 50 MG/1
15 CAPSULE, EXTENDED RELEASE ORAL ONCE
Refills: 0 | Status: DISCONTINUED | OUTPATIENT
Start: 2023-02-10 | End: 2023-02-10

## 2023-02-10 RX ORDER — MORPHINE SULFATE 50 MG/1
15 CAPSULE, EXTENDED RELEASE ORAL DAILY
Refills: 0 | Status: DISCONTINUED | OUTPATIENT
Start: 2023-02-10 | End: 2023-02-10

## 2023-02-10 RX ORDER — FENTANYL CITRATE 50 UG/ML
1 INJECTION INTRAVENOUS
Qty: 0 | Refills: 0 | DISCHARGE

## 2023-02-10 RX ORDER — LEVOTHYROXINE SODIUM 125 MCG
88 TABLET ORAL DAILY
Refills: 0 | Status: DISCONTINUED | OUTPATIENT
Start: 2023-02-10 | End: 2023-02-11

## 2023-02-10 RX ORDER — ACETAMINOPHEN 500 MG
650 TABLET ORAL EVERY 6 HOURS
Refills: 0 | Status: DISCONTINUED | OUTPATIENT
Start: 2023-02-10 | End: 2023-02-12

## 2023-02-10 RX ORDER — LEVOTHYROXINE SODIUM 125 MCG
1 TABLET ORAL
Qty: 0 | Refills: 0 | DISCHARGE

## 2023-02-10 RX ORDER — ROPINIROLE 8 MG/1
1 TABLET, FILM COATED, EXTENDED RELEASE ORAL
Qty: 0 | Refills: 0 | DISCHARGE

## 2023-02-10 RX ORDER — UPADACITINIB 45 MG/1
1 TABLET, EXTENDED RELEASE ORAL
Qty: 0 | Refills: 0 | DISCHARGE

## 2023-02-10 RX ORDER — MORPHINE SULFATE 50 MG/1
30 CAPSULE, EXTENDED RELEASE ORAL AT BEDTIME
Refills: 0 | Status: DISCONTINUED | OUTPATIENT
Start: 2023-02-10 | End: 2023-02-10

## 2023-02-10 RX ORDER — CHOLECALCIFEROL (VITAMIN D3) 125 MCG
500 CAPSULE ORAL DAILY
Refills: 0 | Status: DISCONTINUED | OUTPATIENT
Start: 2023-02-10 | End: 2023-02-12

## 2023-02-10 RX ORDER — CEFTRIAXONE 500 MG/1
1000 INJECTION, POWDER, FOR SOLUTION INTRAMUSCULAR; INTRAVENOUS EVERY 24 HOURS
Refills: 0 | Status: DISCONTINUED | OUTPATIENT
Start: 2023-02-10 | End: 2023-02-10

## 2023-02-10 RX ORDER — PREGABALIN 225 MG/1
1000 CAPSULE ORAL DAILY
Refills: 0 | Status: DISCONTINUED | OUTPATIENT
Start: 2023-02-10 | End: 2023-02-12

## 2023-02-10 RX ORDER — PANTOPRAZOLE SODIUM 20 MG/1
40 TABLET, DELAYED RELEASE ORAL
Refills: 0 | Status: DISCONTINUED | OUTPATIENT
Start: 2023-02-10 | End: 2023-02-12

## 2023-02-10 RX ORDER — POTASSIUM CHLORIDE 20 MEQ
40 PACKET (EA) ORAL DAILY
Refills: 0 | Status: DISCONTINUED | OUTPATIENT
Start: 2023-02-10 | End: 2023-02-12

## 2023-02-10 RX ORDER — ROPINIROLE 8 MG/1
1 TABLET, FILM COATED, EXTENDED RELEASE ORAL
Refills: 0 | Status: DISCONTINUED | OUTPATIENT
Start: 2023-02-10 | End: 2023-02-12

## 2023-02-10 RX ORDER — LANOLIN ALCOHOL/MO/W.PET/CERES
5 CREAM (GRAM) TOPICAL AT BEDTIME
Refills: 0 | Status: DISCONTINUED | OUTPATIENT
Start: 2023-02-10 | End: 2023-02-12

## 2023-02-10 RX ORDER — TOCILIZUMAB 20 MG/ML
0 INJECTION, SOLUTION, CONCENTRATE INTRAVENOUS
Qty: 0 | Refills: 0 | DISCHARGE

## 2023-02-10 RX ORDER — CALCIUM CARBONATE 500(1250)
1 TABLET ORAL
Refills: 0 | Status: DISCONTINUED | OUTPATIENT
Start: 2023-02-10 | End: 2023-02-12

## 2023-02-10 RX ORDER — MORPHINE SULFATE 50 MG/1
15 CAPSULE, EXTENDED RELEASE ORAL DAILY
Refills: 0 | Status: DISCONTINUED | OUTPATIENT
Start: 2023-02-11 | End: 2023-02-12

## 2023-02-10 RX ORDER — CEFTRIAXONE 500 MG/1
1000 INJECTION, POWDER, FOR SOLUTION INTRAMUSCULAR; INTRAVENOUS EVERY 24 HOURS
Refills: 0 | Status: DISCONTINUED | OUTPATIENT
Start: 2023-02-10 | End: 2023-02-11

## 2023-02-10 RX ORDER — MORPHINE SULFATE 50 MG/1
30 CAPSULE, EXTENDED RELEASE ORAL AT BEDTIME
Refills: 0 | Status: DISCONTINUED | OUTPATIENT
Start: 2023-02-10 | End: 2023-02-12

## 2023-02-10 RX ORDER — RASAGILINE 0.5 MG/1
1 TABLET ORAL DAILY
Refills: 0 | Status: DISCONTINUED | OUTPATIENT
Start: 2023-02-10 | End: 2023-02-12

## 2023-02-10 RX ORDER — MONTELUKAST 4 MG/1
10 TABLET, CHEWABLE ORAL DAILY
Refills: 0 | Status: DISCONTINUED | OUTPATIENT
Start: 2023-02-10 | End: 2023-02-12

## 2023-02-10 RX ORDER — CARBIDOPA AND LEVODOPA 25; 100 MG/1; MG/1
1 TABLET ORAL
Refills: 0 | Status: DISCONTINUED | OUTPATIENT
Start: 2023-02-10 | End: 2023-02-12

## 2023-02-10 RX ORDER — ESCITALOPRAM OXALATE 10 MG/1
1 TABLET, FILM COATED ORAL
Qty: 0 | Refills: 0 | DISCHARGE

## 2023-02-10 RX ORDER — ESCITALOPRAM OXALATE 10 MG/1
20 TABLET, FILM COATED ORAL DAILY
Refills: 0 | Status: DISCONTINUED | OUTPATIENT
Start: 2023-02-10 | End: 2023-02-12

## 2023-02-10 RX ORDER — METHOTREXATE 2.5 MG/1
0 TABLET ORAL
Qty: 0 | Refills: 0 | DISCHARGE

## 2023-02-10 RX ORDER — ENOXAPARIN SODIUM 100 MG/ML
40 INJECTION SUBCUTANEOUS EVERY 24 HOURS
Refills: 0 | Status: DISCONTINUED | OUTPATIENT
Start: 2023-02-10 | End: 2023-02-12

## 2023-02-10 RX ORDER — LORATADINE 10 MG/1
10 TABLET ORAL DAILY
Refills: 0 | Status: DISCONTINUED | OUTPATIENT
Start: 2023-02-10 | End: 2023-02-12

## 2023-02-10 RX ORDER — CARBIDOPA AND LEVODOPA 25; 100 MG/1; MG/1
1 TABLET ORAL
Qty: 0 | Refills: 0 | DISCHARGE

## 2023-02-10 RX ORDER — AZITHROMYCIN 500 MG/1
500 TABLET, FILM COATED ORAL EVERY 24 HOURS
Refills: 0 | Status: DISCONTINUED | OUTPATIENT
Start: 2023-02-10 | End: 2023-02-11

## 2023-02-10 RX ORDER — CARBIDOPA AND LEVODOPA 25; 100 MG/1; MG/1
0 TABLET ORAL
Qty: 0 | Refills: 0 | DISCHARGE

## 2023-02-10 RX ADMIN — CEFTRIAXONE 100 MILLIGRAM(S): 500 INJECTION, POWDER, FOR SOLUTION INTRAMUSCULAR; INTRAVENOUS at 06:51

## 2023-02-10 RX ADMIN — MONTELUKAST 10 MILLIGRAM(S): 4 TABLET, CHEWABLE ORAL at 11:03

## 2023-02-10 RX ADMIN — MORPHINE SULFATE 30 MILLIGRAM(S): 50 CAPSULE, EXTENDED RELEASE ORAL at 21:34

## 2023-02-10 RX ADMIN — CARBIDOPA AND LEVODOPA 1 TABLET(S): 25; 100 TABLET ORAL at 18:23

## 2023-02-10 RX ADMIN — ESCITALOPRAM OXALATE 20 MILLIGRAM(S): 10 TABLET, FILM COATED ORAL at 11:04

## 2023-02-10 RX ADMIN — Medication 325 MILLIGRAM(S): at 18:23

## 2023-02-10 RX ADMIN — Medication 5 MILLIGRAM(S): at 21:34

## 2023-02-10 RX ADMIN — ROPINIROLE 1 MILLIGRAM(S): 8 TABLET, FILM COATED, EXTENDED RELEASE ORAL at 18:23

## 2023-02-10 RX ADMIN — MORPHINE SULFATE 15 MILLIGRAM(S): 50 CAPSULE, EXTENDED RELEASE ORAL at 16:00

## 2023-02-10 RX ADMIN — Medication 1 TABLET(S): at 18:23

## 2023-02-10 RX ADMIN — MAGNESIUM OXIDE 400 MG ORAL TABLET 400 MILLIGRAM(S): 241.3 TABLET ORAL at 18:23

## 2023-02-10 RX ADMIN — Medication 40 MILLIEQUIVALENT(S): at 18:22

## 2023-02-10 RX ADMIN — RASAGILINE 1 MILLIGRAM(S): 0.5 TABLET ORAL at 11:04

## 2023-02-10 RX ADMIN — Medication 500 UNIT(S): at 18:22

## 2023-02-10 RX ADMIN — Medication 88 MICROGRAM(S): at 21:31

## 2023-02-10 RX ADMIN — AZITHROMYCIN 255 MILLIGRAM(S): 500 TABLET, FILM COATED ORAL at 15:46

## 2023-02-10 RX ADMIN — MORPHINE SULFATE 15 MILLIGRAM(S): 50 CAPSULE, EXTENDED RELEASE ORAL at 15:45

## 2023-02-10 RX ADMIN — ENOXAPARIN SODIUM 40 MILLIGRAM(S): 100 INJECTION SUBCUTANEOUS at 18:22

## 2023-02-10 RX ADMIN — LORATADINE 10 MILLIGRAM(S): 10 TABLET ORAL at 18:23

## 2023-02-10 RX ADMIN — PREGABALIN 1000 MICROGRAM(S): 225 CAPSULE ORAL at 18:23

## 2023-02-10 NOTE — H&P ADULT - HISTORY OF PRESENT ILLNESS
82-year-old female with past medical history pulmonary embolism, DWAYNE on CPAP, obesity, OA, RA, seizures presents BIBA for possible seizure around 10 AM. Patient's daughter at bedside states a family friend who assists with taking care of patient arrived at patient's home at 10AM And found patient shaking in bed with red and face, diaphoretic.  Per daughter, patient has not had seizures in 10 years and is not currently taking any antiseizure meds. Patient endorses headache and feeling confused. Denies visual changes, chest pain, abdominal pain, n/v/d, urinary s    82-year-old female with past medical history pulmonary embolism, DWAYNE on CPAP, obesity, OA, RA, seizures presents BIBA for generalized shaking at 10 AM. Patient's daughter at bedside states a family friend who assists with taking care of patient arrived at patient's home at 10AM And found patient shaking in bed with red and face, diaphoretic.  Per daughter, patient has not had seizures in 10 years and is not currently taking any antiseizure meds. Patient endorsed generalized headache and brain fog after event.       82-year-old female with past medical history pulmonary embolism, DWAYNE on CPAP, obesity, OA, RA, seizures presents BIBA for generalized shaking at 10 AM. Patient's daughter at bedside states a family friend who assists with taking care of patient arrived at patient's home at 10AM And found patient shaking in bed with red and face, diaphoretic.  Per daughter, patient has not had seizures in 10 years and is not currently taking any antiseizure meds. Patient endorsed generalized headache and brain fog after event. When talking to the patient she remembers feeling cold, then hot and began shaking. She remembers episode and denies tongue trauma, losing bowel or bladder continence. Episode appears to be more likely due to shivering, patient with 15 WBC, epileptiform cause should be ruled out.     Suggestion:  Routine EEG  Keep magnesium >2  Seizure precautions  Infectious workup, UA+C+S, CXR(pending read)  Management as per primary team.        82-year-old female with past medical history pulmonary embolism, DWAYNE on CPAP, obesity, OA, RA, seizures presents BIBA for generalized shaking at 10 AM. Patient's daughter at bedside states a family friend who assists with taking care of patient arrived at patient's home at 10AM And found patient shaking in bed with red and face, diaphoretic.  Per daughter, patient has not had seizures in 10 years and is not currently taking any antiseizure meds. Patient endorsed generalized headache and brain fog after event. When talking to the patient she remembers feeling cold, then hot and began shaking. She remembers episode and denies tongue trauma, losing bowel or bladder continence. Episode appears to be more likely due to shivering, patient with 15 WBC, epileptiform cause should be ruled out.     Suggestion:  Routine EEG  Keep magnesium >2  Seizure precautions  Infectious workup, UA+C+S, CXR(pending read)  Management as per primary team.        82-year-old female with past medical history ?pulmonary embolism (No AC), DWAYNE on CPAP, obesity, OA, RA, h/o seizures(no AEDs, 10 yrs back) and h/o BAck surgery for compression fractures(2018) on opiods  is brought to the ED for suspected seizure episode (2/9).      Patient was apparently well till yesterday AM when she was found by home aide in her bed shaking, covered in urine, warm and sweating. Patient correlates to the event saying, she felt funny,cold and had a headache while walking. She laid in the bed and had the episode of shaking. Patient thinks she never lost her consciousness but she was found confused during the event. No tongue bite or signs of trauma. Patient has no other complaints. No fever, SOB, N/V, urinary complaints, diarrhea or constipation.       In ED, patient was afebrile, tachycardic - 125/min, normal BP, she was started on O2 at2lpm for spo2-93%.  Blood work - WBC -15k, BUN/Creat - 52/1.0; AST - 113(other LFT's normal); CK <100; Lact -1.9  CT head - No acute events    She was given IV ceftriaxone and azithromycin stat and was admitted for evaluation of the possible seizure episode/ infection.  82-year-old female with past medical history pulmonary embolism (No AC), DWAYNE on CPAP, obesity, OA, RA, h/o seizures(no AEDs, 10 yrs back) and h/o BAck surgery for compression fractures(2018) on opiods  is brought to the ED for suspected seizure episode (2/9).      Patient was apparently well till yesterday AM when she was found by home aide in her bed shaking, covered in urine, warm and sweating. Patient correlates to the event saying, she felt funny,cold and had a headache while walking. She laid in the bed and had the episode of shaking. Patient thinks she never lost her consciousness but she was found confused during the event. No tongue bite or signs of trauma. Patient has no other complaints. No fever, SOB, N/V, urinary complaints, diarrhea or constipation.       In ED, patient was afebrile, tachycardic - 125/min, normal BP, she was started on O2 at2lpm for spo2-93%.  Blood work - WBC -15k, BUN/Creat - 52/1.0; AST - 113(other LFT's normal); CK <100; Lact -1.9  CT head - No acute events    She was given IV ceftriaxone and azithromycin stat and was admitted for evaluation of the possible seizure episode/ infection.

## 2023-02-10 NOTE — PATIENT PROFILE ADULT - NSPROPOAPRESSUREINJURY_GEN_A_NUR
Return if symptoms worsen or new symptoms develop.  Follow-up with primary care physician next available.  Drink plenty of fluids.  If increased fever chills pain or other symptoms present please return for recheck.  He need to see a dentist as soon is possible take ibuprofen for pain take penicillin as directed take pain medication as needed for severe pain.  
no

## 2023-02-10 NOTE — PROGRESS NOTE ADULT - ASSESSMENT
82-year-old woman with past medical history pulmonary embolism (No AC), DWAYNE on CPAP, obesity, OA, RA, h/o seizures (no AEDs,10 yrs back) and h/o Back surgery for compression fractures (2018) on opiods is brought to the ED for suspected seizure episode (2/9).    # unlikely to have sz  f/u EEG report  neuro noted  orthostatics neg  Ct head - wnl    # shaking event was likely chills 2/2 resp infection (prob bronchitis vs PNA)  confusion was metab encephalopathy 2/2 acute hypoxic resp failure at home (RA sat 89%)  elevated WBC x1 and now rapidly returned to nl (this is NOT what is expected w/ infection, should be slower decline to nl)  minor incr LFTs - improving  could consider RUQ U/S  f/u LFTs  U/A is neg  Afebrile, no localizing symptoms    CXR: radiology says b/l opac, but I do NOT see a major PNA (perhaps minor retrocardiac infil)  lung exam was nl (excellent air mvmt; no wheeze; no crackles)  NC O2 to keep sat > 92% -> try to taper off each day as nathalia  abx: c/w rocephin 1gm iv q24 + azithro 50omg iv q24 - can prob change to po isrrael (vantin 200mg po q12 + azithro 250mg po q24) and tx for total 5 days  RVP - negative   f/u Bld cx x 2 and urine cx   cbc isrrael    # there is no NOEMÍ; there is prob some dehydration  BUN better w/ drinking  f/u BMP    # Pansystolic murmur - new   loudest at aortic region  No known cardiac history   No complaints  ECG - sinus tachy, no new changes  Trops - negative  f/u TTE     # Parkinson's disease   c/w sinemet 50/200mg ER BID   c/w Azilect     # Fibromyalgia   c/w Savella 25mg BID  pt has pain in both legs from this    # Hypothyroidism   c/w synthroid 88mcg   f/u AM TSH    # DWAYNE   on CPAP    # RA  hold Rinvoq 15mg till infection ruled out      # H/o Back surgery for compression fractures (2018); OA  Morphine ER for chronic pain - 15mg AM and 30mg PM  c/w gabi+d3 400/500  bowel regimen prn     # VTE ppx - lovenox     # GI ppx - pantop    # Activity - as tolerated with walker, PT eval completed     # updated dtr at bedside    Dispo: abx; f/u EEG; TSH; f/u labs; f/u echo; PT eval; f/u Cx's; taper O2 as nathalia  eventually, pt will go home w/ home PT (hopefully will NOT need home O2) - f/u CM - still acute    [place] would not allow me to co-sign H&P, but I did review it.

## 2023-02-10 NOTE — H&P ADULT - NSHPPHYSICALEXAM_GEN_ALL_CORE
GENERAL: NAD, well-groomed, well-developed  HEAD:  Atraumatic, Normocephalic  EYES: EOMI, PERRLA, conjunctiva and sclera clear  ENMT: No tonsillar erythema, exudates, or enlargement; Moist mucous membranes  NECK: Supple, No JVD, Normal thyroid  HEART: Regular rate and rhythm; No murmurs, rubs, or gallops  RESPIRATORY: CTA B/L, No W/R/R  ABDOMEN: Soft, Nontender, Nondistended; Bowel sounds present  NEUROLOGY: A&Ox3, nonfocal, moving all extremities  EXTREMITIES:  2+ Peripheral Pulses, No clubbing, cyanosis, or edema  SKIN: warm, dry, normal color, no rash or abnormal lesions GENERAL: NAD, well-groomed, well-developed  HEAD:  Atraumatic, Normocephalic  EYES: EOMI, PERRLA, conjunctiva and sclera clear  ENMT: No tonsillar erythema, exudates, or enlargement; Moist mucous membranes  NECK: Supple, No JVD, Normal thyroid  HEART: Regular rate and rhythm; Pansystolic murmur present; loudest at Aortic region  RESPIRATORY: Inspiratory crepts + - L>R, possibly dependent   ABDOMEN: Soft, Nontender, Nondistended; Bowel sounds present  NEUROLOGY: A&Ox3, nonfocal, moving all extremities  EXTREMITIES:  2+ Peripheral Pulses, No clubbing, cyanosis, or edema; pain in B/L LE on palpation ,   SKIN: warm, dry, normal color, no rash or abnormal lesions

## 2023-02-10 NOTE — CONSULT NOTE ADULT - ASSESSMENT
Impression:  82-year-old female with past medical history pulmonary embolism, DWAYNE on CPAP, obesity, OA, RA, seizures presents BIBA for generalized shaking at 10 AM. Patient's daughter at bedside states a family friend who assists with taking care of patient arrived at patient's home at 10AM And found patient shaking in bed with red and face, diaphoretic.  Per daughter, patient has not had seizures in 10 years and is not currently taking any antiseizure meds. Patient endorsed generalized headache and brain fog after event. When talking to the patient she remembers feeling cold, then hot and began shaking. She remembers episode and denies tongue trauma, losing bowel or bladder continence. Episode appears to be more likely due to shivering, patient with 15 WBC, epileptiform cause should be ruled out.     Suggestion:  Routine EEG  Keep magnesium >2  Seizure precautions  Infectious workup, UA+C+S, CXR(pending read)  Management as per primary team.  Impression:  82-year-old female with past medical history pulmonary embolism, DWAYNE on CPAP, obesity, OA, RA, seizures presents BIBA for generalized shaking at 10 AM. Patient's daughter at bedside states a family friend who assists with taking care of patient arrived at patient's home at 10AM And found patient shaking in bed with red and face, diaphoretic.  Per daughter, patient has not had seizures in 10 years and is not currently taking any antiseizure meds. Patient endorsed generalized headache and brain fog after event. When talking to the patient she remembers feeling cold, then hot and began shaking. She remembers episode and denies tongue trauma, losing bowel or bladder continence. Episode appears to be more likely due to shivering, patient with 15 WBC, epileptiform cause should be ruled out.     Suggestion:  Routine EEG  Keep magnesium >2  Seizure precautions  Infectious workup, UA+C+S, CXR(pending read)  Management as per primary team.   If EEG (-), no further inpt neurologic w/u Impression:  82-year-old female with past medical history pulmonary embolism, DWAYNE on CPAP, obesity, OA, RA, seizures presents BIBA for generalized shaking at 10 AM. Patient's daughter at bedside states a family friend who assists with taking care of patient arrived at patient's home at 10AM And found patient shaking in bed with red and face, diaphoretic.  Per daughter, patient has not had seizures in 10 years and is not currently taking any antiseizure meds. Patient endorsed generalized headache and brain fog after event. When talking to the patient she remembers feeling cold, then hot and began shaking. She remembers episode and denies tongue trauma, losing bowel or bladder continence. Episode appears to be more likely due to shivering, patient with 15 WBC, epileptiform cause should be ruled out.     Suggestion:  Routine EEG  Keep magnesium >2  continue home dose Sinemet  Seizure precautions  Infectious workup, UA+C+S, CXR(pending read)  Management as per primary team.   check orthostatics - if positive recommend FLOR compression stockings  If EEG (-), no further inpt neurologic w/u and f/u with Dr. Houser/Gianni as outpt

## 2023-02-10 NOTE — H&P ADULT - NSHPLABSRESULTS_GEN_ALL_CORE
.  LABS:                         13.3   15.55 )-----------( 254      ( 2023 17:27 )             39.3     02    136  |  97<L>  |  52<H>  ----------------------------<  119<H>  4.3   |  27  |  1.0    Ca    9.9      2023 17:27  Mg     2.1         TPro  7.4  /  Alb  4.4  /  TBili  0.9  /  DBili  x   /  AST  113<H>  /  ALT  25  /  AlkPhos  83  -      Urinalysis Basic - ( 2023 20:54 )    Color: Light Yellow / Appearance: Clear / S.019 / pH: x  Gluc: x / Ketone: Negative  / Bili: Negative / Urobili: <2 mg/dL   Blood: x / Protein: Negative / Nitrite: Negative   Leuk Esterase: Moderate / RBC: 1 /HPF / WBC 3 /HPF   Sq Epi: x / Non Sq Epi: 1 /HPF / Bacteria: Negative      RADIOLOGY, EKG & ADDITIONAL TESTS: Reviewed.

## 2023-02-10 NOTE — PHYSICAL THERAPY INITIAL EVALUATION ADULT - ADDITIONAL COMMENTS
pt is a 83 y/o female who lives with her spouse in pvt home. + chair lift, + RW. pt has HHA during day time

## 2023-02-10 NOTE — CONSULT NOTE ADULT - ATTENDING COMMENTS
Pt w/ remote h/o "seizure-like" events related to ? neurontin w/d evaluated in the past > 10 yrs ago not on AEDs currently p/w syncopal episode.  recommend check REEG and if negative, no indication for AEDs.  Recommend cardiac w/u for syncope. Pt w/ remote h/o "seizure-like" events related to ? neurontin w/d evaluated in the past > 10 yrs ago not on AEDs currently p/w syncopal episode.  recommend check REEG and if negative, no indication for AEDs.  Rest of recommendations as above.

## 2023-02-10 NOTE — PATIENT PROFILE ADULT - FALL HARM RISK - FACTORS
INFECTIOUS DISEASES FOLLOW UP-- Arabella Escalante  [phone]    This is a follow up note for this  66yMale with  Cardiomyopathy        ROS:  CONSTITUTIONAL:  No fever, good appetite  CARDIOVASCULAR:  No chest pain or palpitations  RESPIRATORY:  No dyspnea  GASTROINTESTINAL:  No nausea, vomiting, diarrhea, or abdominal pain  GENITOURINARY:  No dysuria  NEUROLOGIC:  No headache,     Allergies    No Known Allergies    Intolerances        ANTIBIOTICS/RELEVANT:  antimicrobials  cefepime   IVPB 1000 milliGRAM(s) IV Intermittent every 8 hours  vancomycin    Solution 125 milliGRAM(s) Oral every 12 hours  vancomycin  IVPB 750 milliGRAM(s) IV Intermittent every 12 hours    immunologic:  mycophenolate mofetil IVPB 1000 milliGRAM(s) IV Intermittent <User Schedule>  tacrolimus 0.5 milliGRAM(s) SubLingual <User Schedule>    OTHER:  acetaminophen     Tablet .. 650 milliGRAM(s) Oral every 6 hours  chlorhexidine 2% Cloths 1 Application(s) Topical <User Schedule>  dexMEDEtomidine Infusion 0.3 MICROgram(s)/kG/Hr IV Continuous <Continuous>  dextrose 50% Injectable 50 milliLiter(s) IV Push every 15 minutes  dextrose 50% Injectable 25 milliLiter(s) IV Push every 15 minutes  DOBUTamine Infusion 2.5 MICROgram(s)/kG/Min IV Continuous <Continuous>  gabapentin 100 milliGRAM(s) Oral two times a day  heparin   Injectable 5000 Unit(s) SubCutaneous every 8 hours  insulin regular Infusion 3 Unit(s)/Hr IV Continuous <Continuous>  methylPREDNISolone sodium succinate Injectable 125 milliGRAM(s) IV Push every 8 hours  milrinone Infusion 0.4 MICROgram(s)/kG/Min IV Continuous <Continuous>  mupirocin 2% Nasal 1 Application(s) Both Nostrils every 12 hours  norepinephrine Infusion 0.05 MICROgram(s)/kG/Min IV Continuous <Continuous>  pantoprazole  Injectable 40 milliGRAM(s) IV Push daily  polyethylene glycol 3350 17 Gram(s) Oral daily  sodium chloride 0.9%. 1000 milliLiter(s) IV Continuous <Continuous>  traMADol 25 milliGRAM(s) Oral every 8 hours      Objective:  Vital Signs Last 24 Hrs  T(C): 36.1 (22 Jun 2022 16:00), Max: 37.5 (22 Jun 2022 00:00)  T(F): 97 (22 Jun 2022 16:00), Max: 99.5 (22 Jun 2022 00:00)  HR: 92 (22 Jun 2022 15:59) (82 - 118)  BP: 118/80 (21 Jun 2022 20:45) (118/80 - 118/80)  BP(mean): 94 (21 Jun 2022 20:45) (94 - 94)  RR: 18 (22 Jun 2022 15:39) (14 - 45)  SpO2: 100% (22 Jun 2022 15:59) (99% - 100%)    PHYSICAL EXAM:  Constitutional:no acute distress  Eyes:JOHNY, EOMI  Ear/Nose/Throat: no oral lesions, 	  Respiratory: clear BL  Cardiovascular: S1S2  Gastrointestinal:soft, (+) BS, no tenderness  Extremities:no e/e/c  No Lymphadenopathy  IV sites not inflammed.    LABS:                        9.6    14.67 )-----------( 161      ( 22 Jun 2022 01:10 )             28.6     06-22    147<H>  |  104  |  20  ----------------------------<  217<H>  3.8   |  23  |  1.10    Ca    10.2      22 Jun 2022 01:10  Phos  2.1     06-22  Mg     2.3     06-22    TPro  5.8<L>  /  Alb  4.4  /  TBili  2.5<H>  /  DBili  x   /  AST  136<H>  /  ALT  42  /  AlkPhos  37<L>  06-22    PT/INR - ( 22 Jun 2022 01:10 )   PT: 16.0 sec;   INR: 1.37 ratio         PTT - ( 22 Jun 2022 01:10 )  PTT:46.3 sec      MICROBIOLOGY:  Culture Results:   Testing in progress (06-21 @ 23:09)  Culture Results:   Testing in progress (06-21 @ 23:09)  Culture Results:   Testing in progress (06-21 @ 23:09)            RECENT CULTURES:  06-21 @ 23:09  .Tissue Other  --  --  --    Testing in progress  --      RADIOLOGY & ADDITIONAL STUDIES:  < from: Xray Chest 1 View AP/PA (06.21.22 @ 09:08) >  IMPRESSION:  Clear lungs.    < end of copied text >   Other medical problems/Weakness INFECTIOUS DISEASES FOLLOW UP-- Arabella Escalante  [phone]    This is a follow up note for this  66yMale with  Cardiomyopathy  s/p heart transplant  extubated      ROS:  CONSTITUTIONAL:  No fever, good appetite  CARDIOVASCULAR:  No chest pain or palpitations  RESPIRATORY:  No dyspnea  GASTROINTESTINAL:  No nausea, vomiting, diarrhea, or abdominal pain  GENITOURINARY:  No dysuria  NEUROLOGIC:  No headache,     Allergies    No Known Allergies    Intolerances        ANTIBIOTICS/RELEVANT:  antimicrobials  cefepime   IVPB 1000 milliGRAM(s) IV Intermittent every 8 hours  vancomycin    Solution 125 milliGRAM(s) Oral every 12 hours  vancomycin  IVPB 750 milliGRAM(s) IV Intermittent every 12 hours    immunologic:  mycophenolate mofetil IVPB 1000 milliGRAM(s) IV Intermittent <User Schedule>  tacrolimus 0.5 milliGRAM(s) SubLingual <User Schedule>    OTHER:  acetaminophen     Tablet .. 650 milliGRAM(s) Oral every 6 hours  chlorhexidine 2% Cloths 1 Application(s) Topical <User Schedule>  dexMEDEtomidine Infusion 0.3 MICROgram(s)/kG/Hr IV Continuous <Continuous>  dextrose 50% Injectable 50 milliLiter(s) IV Push every 15 minutes  dextrose 50% Injectable 25 milliLiter(s) IV Push every 15 minutes  DOBUTamine Infusion 2.5 MICROgram(s)/kG/Min IV Continuous <Continuous>  gabapentin 100 milliGRAM(s) Oral two times a day  heparin   Injectable 5000 Unit(s) SubCutaneous every 8 hours  insulin regular Infusion 3 Unit(s)/Hr IV Continuous <Continuous>  methylPREDNISolone sodium succinate Injectable 125 milliGRAM(s) IV Push every 8 hours  milrinone Infusion 0.4 MICROgram(s)/kG/Min IV Continuous <Continuous>  mupirocin 2% Nasal 1 Application(s) Both Nostrils every 12 hours  norepinephrine Infusion 0.05 MICROgram(s)/kG/Min IV Continuous <Continuous>  pantoprazole  Injectable 40 milliGRAM(s) IV Push daily  polyethylene glycol 3350 17 Gram(s) Oral daily  sodium chloride 0.9%. 1000 milliLiter(s) IV Continuous <Continuous>  traMADol 25 milliGRAM(s) Oral every 8 hours      Objective:  Vital Signs Last 24 Hrs  T(C): 36.1 (22 Jun 2022 16:00), Max: 37.5 (22 Jun 2022 00:00)  T(F): 97 (22 Jun 2022 16:00), Max: 99.5 (22 Jun 2022 00:00)  HR: 92 (22 Jun 2022 15:59) (82 - 118)  BP: 118/80 (21 Jun 2022 20:45) (118/80 - 118/80)  BP(mean): 94 (21 Jun 2022 20:45) (94 - 94)  RR: 18 (22 Jun 2022 15:39) (14 - 45)  SpO2: 100% (22 Jun 2022 15:59) (99% - 100%)    PHYSICAL EXAM:  Constitutional:no acute distress, awake, alert  Eyes:JOHNY, EOMI  Ear/Nose/Throat: no oral lesions, extubated	  Respiratory: clear BL  sternotomy dressing CDI  3 chest tubes  Cardiovascular: S1S2  Gastrointestinal:soft, (+) BS, no tenderness  Extremities:no e/e/c  No Lymphadenopathy  IV sites not inflammed.    LABS:                        9.6    14.67 )-----------( 161      ( 22 Jun 2022 01:10 )             28.6     06-22    147<H>  |  104  |  20  ----------------------------<  217<H>  3.8   |  23  |  1.10    Ca    10.2      22 Jun 2022 01:10  Phos  2.1     06-22  Mg     2.3     06-22    TPro  5.8<L>  /  Alb  4.4  /  TBili  2.5<H>  /  DBili  x   /  AST  136<H>  /  ALT  42  /  AlkPhos  37<L>  06-22    PT/INR - ( 22 Jun 2022 01:10 )   PT: 16.0 sec;   INR: 1.37 ratio         PTT - ( 22 Jun 2022 01:10 )  PTT:46.3 sec      MICROBIOLOGY:  Culture Results:   Testing in progress (06-21 @ 23:09)  Culture Results:   Testing in progress (06-21 @ 23:09)  Culture Results:   Testing in progress (06-21 @ 23:09)            RECENT CULTURES:  06-21 @ 23:09  .Tissue Other  --  --  --    Testing in progress  --      RADIOLOGY & ADDITIONAL STUDIES:  < from: Xray Chest 1 View AP/PA (06.21.22 @ 09:08) >  IMPRESSION:  Clear lungs.    < end of copied text >

## 2023-02-10 NOTE — H&P ADULT - ASSESSMENT
82-year-old female with past medical history ?pulmonary embolism (No AC), DWAYNE on CPAP, obesity, OA, RA, h/o seizures(no AEDs, 10 yrs back) and h/o BAck surgery for compression fractures(2018) on opiods  is brought to the ED for suspected seizure episode (2/9).    # Possible seizure episode vs chills 2/2 to infection  - Unwitnessed with urinary incontinence, headache and confusion   - elevated WBC's - 15K; AST - 113; Lactates - 1.9 - ?2/2 to seizures   -       # NOEMÍ   - pre-renal   - BUN -52, creat-1  - trend  82-year-old female with past medical history pulmonary embolism (No AC), DWAYNE on CPAP, obesity, OA, RA, h/o seizures(no AEDs, 10 yrs back) and h/o BAck surgery for compression fractures(2018) on opiods  is brought to the ED for suspected seizure episode (2/9).    # Possible seizure episode vs chills 2/2 to infection  - Unwitnessed with urinary incontinence, headache and confusion   - elevated WBC's - 15K; AST - 113; Lactates - 1.9 - ?2/2 to seizures   - CXR - no obvious findings   - UA - negative   - Bld cx x 2 and urine cx - awaited   - Ct head - wnl  - EEG  and neuro recs - awaited   - Hold antibiotics for now as no obvious signs of infection, episode seems to be sudden and resolved - may start if patient has signs of infection.     # NOEMÍ   - pre-renal   - BUN -52, creat-1  - trend     #Pansystolic murmur - new   - No known cardiac history    82-year-old female with past medical history pulmonary embolism (No AC), DWAYNE on CPAP, obesity, OA, RA, h/o seizures(no AEDs, 10 yrs back) and h/o Back surgery for compression fractures(2018) on opiods  is brought to the ED for suspected seizure episode (2/9).    # Possible seizure episode vs chills 2/2 to infection  - Unwitnessed with urinary incontinence, headache and confusion   - elevated WBC's - 15K; AST - 113; Lactates - 1.9 - ?2/2 to seizures  - Afebrile, no localizing symptoms    - CXR - no obvious findings   - UA - negative   - Bld cx x 2 and urine cx - awaited   - Ct head - wnl  - Electrolytes - normal  - EEG  and neuro recs - awaited   - Hold antibiotics for now as no obvious signs of infection, episode seems to be sudden and resolved - may start if patient has signs of infection.     # NOEMÍ   - pre-renal   - BUN -52, creat-1, bicarb -27  - trend     #Pansystolic murmur - new   - loudest at aortic region  - No known cardiac history   - No complaints  - Crepts +; L>R; dependent - possible atelectesis; no signs of failure  - On O2 @2lpm in ED   - ECG - sinus tachy, no new changes  - Trops - negative  - f/u TTE     # Parkinson's disease   - c/w sinemet 50/200mg ER BID     # Fibromyalgia   - c/w Savella 25mg QD    #DWAYNE on CPAP    #RA  - hold Rinvoq 15mg till infection ruled out      #h/o seizures(no AEDs, 10 yrs back)  - awaiting EEG and neuro recs     #H/o Back surgery for compression fractures(2018)   #OA  - Morphine ER for chronic pain  - bowel regimen prn     #Misc  - VTE ppx - lovenox   - GI ppx - pantop  - Activity - as tolerated with walker   - Diet - DASH  - Dispo - home with home care  82-year-old female with past medical history pulmonary embolism (No AC), DWAYNE on CPAP, obesity, OA, RA, h/o seizures(no AEDs, 10 yrs back) and h/o Back surgery for compression fractures(2018) on opiods  is brought to the ED for suspected seizure episode (2/9).    # Possible seizure episode vs chills 2/2 to infection  - Unwitnessed with urinary incontinence, headache and confusion   - elevated WBC's - 15K; AST - 113; Lactates - 1.9 - ?2/2 to seizures  - Afebrile, no localizing symptoms    - CXR - no obvious findings   - UA - negative   - Bld cx x 2 and urine cx - awaited   - Ct head - wnl  - Electrolytes - normal  - EEG  and neuro recs - awaited   - Hold antibiotics for now as no obvious signs of infection, episode seems to be sudden and resolved - may start if patient has signs of infection.     # NOEMÍ   - pre-renal   - BUN -52, creat-1, bicarb -27  - trend     #Pansystolic murmur - new   - loudest at aortic region  - No known cardiac history   - No complaints  - Crepts +; L>R; dependent - possible atelectesis; no signs of failure  - On O2 @2lpm in ED - wean  - ECG - sinus tachy, no new changes  - Trops - negative  - f/u TTE     # Parkinson's disease   - c/w sinemet 50/200mg ER BID     # Fibromyalgia   - c/w Savella 25mg QD    #DWAYNE on CPAP    #RA  - hold Rinvoq 15mg till infection ruled out      #h/o seizures(no AEDs, 10 yrs back)  - awaiting EEG and neuro recs     #H/o Back surgery for compression fractures(2018)   #OA  - Morphine ER for chronic pain  - bowel regimen prn     #Misc  - VTE ppx - lovenox   - GI ppx - pantop  - Activity - as tolerated with walker   - Diet - DASH  - Dispo - home with home care  82-year-old female with past medical history pulmonary embolism (No AC), DWAYNE on CPAP, obesity, OA, RA, h/o seizures(no AEDs, 10 yrs back) and h/o Back surgery for compression fractures(2018) on opiods  is brought to the ED for suspected seizure episode (2/9).    # Possible seizure episode vs chills 2/2 to infection  - Unwitnessed with urinary incontinence, headache and confusion   - elevated WBC's - 15K; AST - 113; Lactates - 1.9 - ?2/2 to seizures  - Afebrile, no localizing symptoms    - CXR - no obvious findings   - UA - negative   - RVP - negative   - Bld cx x 2 and urine cx - awaited   - Ct head - wnl  - Electrolytes - normal  - EEG  and neuro recs - awaited   - Hold antibiotics for now as no obvious signs of infection, episode seems to be sudden and resolved - may start if patient has signs of infection.     # NOEMÍ   - pre-renal   - BUN -52, creat-1, bicarb -27  - trend     #Pansystolic murmur - new   - loudest at aortic region  - No known cardiac history   - No complaints  - Crepts +; L>R; dependent - possible atelectesis; no signs of failure  - On O2 @2lpm in ED - wean  - ECG - sinus tachy, no new changes  - Trops - negative  - f/u TTE     # Parkinson's disease   - c/w sinemet 50/200mg ER BID     # Fibromyalgia   - c/w Savella 25mg BID    #Hypothyroidism   - c/w synthroid 88mcg   - f/u AM TSH    #DWAYNE   - on CPAP    #RA  - hold Rinvoq 15mg till infection ruled out      #h/o seizures(no AEDs, 10 yrs back)  - awaiting EEG and neuro recs     #H/o Back surgery for compression fractures(2018)   #OA  - Morphine ER for chronic pain  - bowel regimen prn     #Misc  - VTE ppx - lovenox   - GI ppx - pantop  - Activity - as tolerated with walker   - Diet - DASH  - Dispo - home with home care  82-year-old female with past medical history pulmonary embolism (No AC), DWAYNE on CPAP, obesity, OA, RA, h/o seizures(no AEDs, 10 yrs back) and h/o Back surgery for compression fractures(2018) on opiods  is brought to the ED for suspected seizure episode (2/9).    # Possible seizure episode vs chills 2/2 to infection  - Unwitnessed with urinary incontinence, headache and confusion   - elevated WBC's - 15K; AST - 113; Lactates - 1.9 - ?2/2 to seizures  - Afebrile, no localizing symptoms    - CXR - no obvious findings   - UA - negative   - RVP - negative   - Bld cx x 2 and urine cx - awaited   - Ct head - wnl  - Electrolytes - normal  - EEG  and neuro recs - awaited   - Hold antibiotics for now as no obvious signs of infection, episode seems to be sudden and resolved - may start if patient has signs of infection.     # NOEMÍ   - pre-renal   - BUN -52, creat-1, bicarb -27  - trend     #Pansystolic murmur - new   - loudest at aortic region  - No known cardiac history   - No complaints  - Crepts +; L>R; dependent - possible atelectesis; no signs of failure  - On O2 @2lpm in ED - wean  - ECG - sinus tachy, no new changes  - Trops - negative  - f/u TTE     # Parkinson's disease   - c/w sinemet 50/200mg ER BID     # Fibromyalgia   - c/w Savella 25mg BID    #Hypothyroidism   - c/w synthroid 88mcg   - f/u AM TSH    #DWAYNE   - on CPAP    #RA  - hold Rinvoq 15mg till infection ruled out      #h/o seizures(no AEDs, 10 yrs back)  - awaiting EEG and neuro recs     #H/o Back surgery for compression fractures(2018)   #OA  - Morphine ER for chronic pain  - bowel regimen prn     #Misc  - VTE ppx - lovenox   - GI ppx - pantop  - Activity - as tolerated with walker, PT eval completed   - Diet - DASH  - Dispo - home with home care  82-year-old female with past medical history pulmonary embolism (No AC), DWAYNE on CPAP, obesity, OA, RA, h/o seizures(no AEDs, 10 yrs back) and h/o Back surgery for compression fractures(2018) on opiods  is brought to the ED for suspected seizure episode (2/9).    # Possible seizure episode vs chills 2/2 to infection  - Unwitnessed with urinary incontinence, headache and confusion   - elevated WBC's - 15K; AST - 113; Lactates - 1.9 - ?2/2 to seizures  - Afebrile, no localizing symptoms    - CXR - no obvious findings   - UA - negative   - RVP - negative   - Bld cx x 2 and urine cx - awaited   - Ct head - wnl  - Electrolytes - normal  - EEG  and neuro recs - awaited   - Hold antibiotics for now as no obvious signs of infection, episode seems to be sudden and resolved - may start if patient has signs of infection.     # NOEMÍ   - pre-renal   - BUN -52, creat-1, bicarb -27  - trend     #Pansystolic murmur - new   - loudest at aortic region  - No known cardiac history   - No complaints  - Crepts +; L>R; dependent - possible atelectesis; no signs of failure  - On O2 @2lpm in ED - wean  - ECG - sinus tachy, no new changes  - Trops - negative  - f/u TTE     # Parkinson's disease   - c/w sinemet 50/200mg ER BID     # Fibromyalgia   - c/w Savella 25mg BID    #Hypothyroidism   - c/w synthroid 88mcg   - f/u AM TSH    #DWAYNE   - on CPAP    #RA  - hold Rinvoq 15mg till infection ruled out      #h/o seizures(no AEDs, 10 yrs back)  - awaiting EEG and neuro recs     #H/o Back surgery for compression fractures(2018)   #OA  - Morphine ER for chronic pain  - c/w gabi+d3 400/500  - bowel regimen prn     #Misc  - VTE ppx - lovenox   - GI ppx - pantop  - Activity - as tolerated with walker, PT eval completed   - Diet - DASH  - Dispo - home with home care

## 2023-02-10 NOTE — PHYSICAL THERAPY INITIAL EVALUATION ADULT - PERTINENT HX OF CURRENT PROBLEM, REHAB EVAL
Chart reviewed. Held off PT IE secondary to no appropriate activity orders. Resident Kotys notified via TEAMS. to f/u when appropriate activity orders are placed.

## 2023-02-10 NOTE — CONSULT NOTE ADULT - SUBJECTIVE AND OBJECTIVE BOX
Neurology Consult    Patient is a 82y old  Female who presents with a chief complaint of episode of generalized shaking    HPI:  82-year-old female with past medical history pulmonary embolism, DWAYNE on CPAP, obesity, OA, RA, seizures presents BIBA for generalized shaking at 10 AM. Patient's daughter at bedside states a family friend who assists with taking care of patient arrived at patient's home at 10AM And found patient shaking in bed with red and face, diaphoretic.  Per daughter, patient has not had seizures in 10 years and is not currently taking any antiseizure meds. Patient endorsed generalized headache and brain fog after event.       PAST MEDICAL & SURGICAL HISTORY:  Hypothyroidism  no recent dosage changes      GERD (gastroesophageal reflux disease)      Psoriatic arthritis      Tremors of nervous system  essential tremors      Fibromyalgia      Rheumatoid arthritis      Osteoarthritis      Obesity  s/p gastric bypass ~15 yrs ago ~100 lb loss      DWAYNE on CPAP      Seasonal allergies      Pulmonary embolism      H/O gastric bypass      Ankle deformity  s/p b/l surgical repair          FAMILY HISTORY:      Social History: (-) x 3    Allergies    adhesives (Pruritus; Rash)  Betadine (Flushing; Pruritus)  NSAIDs (Vomiting; Rash; Nausea)    Intolerances        MEDICATIONS  (STANDING):  cefTRIAXone   IVPB 1000 milliGRAM(s) IV Intermittent once    MEDICATIONS  (PRN):      Vital Signs Last 24 Hrs  T(C): 37.3 (2023 15:50), Max: 37.3 (2023 15:50)  T(F): 99.2 (2023 15:50), Max: 99.2 (2023 15:50)  HR: 128 (2023 15:50) (128 - 128)  BP: 151/82 (2023 15:50) (151/82 - 151/82)  BP(mean): --  RR: --  SpO2: 93% (2023 15:50) (93% - 93%)    Parameters below as of 2023 15:50  Patient On (Oxygen Delivery Method): room air        Examination:  General:  Appearance is consistent with chronologic age.  Mild hypomimia.  Gross skin survey within normal limits.    Cognitive/Language:  The patient is oriented to person, place, time and date.  Recent and remote memory intact.  Fund of knowledge is intact and normal.  Language with normal repetition, comprehension and naming.  Nondysarthric.    Eyes: intact VA, VFF.  EOMI w/o nystagmus, skew or reported double vision.  PERRL.  No ptosis/weakness of eyelid closure.    Face:  Facial sensation normal V1 - 3, no facial asymmetry.    Motor examination:   Normal tone, bulk and range of motion, no cogwheeling.  No tenderness, twitching, however facial tremors and left hand rest tremor  Formal Muscle Strength Testing: (MRC grade R/L) 5/5 UE; 5/5 LE.  No observable drift.  Sensory examination:   Intact to light touch in all extremities.  Cerebellum:   FTN/HKS intact with normal JIMY in all limbs.  No dysmetria or dysdiadokinesia.  Gait deferred    Labs:   CBC Full  -  ( 2023 17:27 )  WBC Count : 15.55 K/uL  RBC Count : 4.19 M/uL  Hemoglobin : 13.3 g/dL  Hematocrit : 39.3 %  Platelet Count - Automated : 254 K/uL  Mean Cell Volume : 93.8 fL  Mean Cell Hemoglobin : 31.7 pg  Mean Cell Hemoglobin Concentration : 33.8 g/dL  Auto Neutrophil # : 14.06 K/uL  Auto Lymphocyte # : 0.74 K/uL  Auto Monocyte # : 0.62 K/uL  Auto Eosinophil # : 0.01 K/uL  Auto Basophil # : 0.04 K/uL  Auto Neutrophil % : 90.3 %  Auto Lymphocyte % : 4.8 %  Auto Monocyte % : 4.0 %  Auto Eosinophil % : 0.1 %  Auto Basophil % : 0.3 %        136  |  97<L>  |  52<H>  ----------------------------<  119<H>  4.3   |  27  |  1.0    Ca    9.9      2023 17:27  Mg     2.1         TPro  7.4  /  Alb  4.4  /  TBili  0.9  /  DBili  x   /  AST  113<H>  /  ALT  25  /  AlkPhos  83  09    LIVER FUNCTIONS - ( 2023 17:27 )  Alb: 4.4 g/dL / Pro: 7.4 g/dL / ALK PHOS: 83 U/L / ALT: 25 U/L / AST: 113 U/L / GGT: x             Urinalysis Basic - ( 2023 20:54 )    Color: Light Yellow / Appearance: Clear / S.019 / pH: x  Gluc: x / Ketone: Negative  / Bili: Negative / Urobili: <2 mg/dL   Blood: x / Protein: Negative / Nitrite: Negative   Leuk Esterase: Moderate / RBC: 1 /HPF / WBC 3 /HPF   Sq Epi: x / Non Sq Epi: 1 /HPF / Bacteria: Negative          Neuroimaging:  NCHCT: CT Head No Cont:   ACC: [id number] EXAM:  CT BRAIN   ORDERED BY: JULES HARTMAN     IMPRESSION:    No acute intracranial hemorrhage, mass-effect or midline shift.    x

## 2023-02-10 NOTE — PATIENT PROFILE ADULT - FALL HARM RISK - HARM RISK INTERVENTIONS

## 2023-02-11 LAB
A1C WITH ESTIMATED AVERAGE GLUCOSE RESULT: 5.6 % — SIGNIFICANT CHANGE UP (ref 4–5.6)
ALBUMIN SERPL ELPH-MCNC: 3.8 G/DL — SIGNIFICANT CHANGE UP (ref 3.5–5.2)
ALP SERPL-CCNC: 74 U/L — SIGNIFICANT CHANGE UP (ref 30–115)
ALT FLD-CCNC: 7 U/L — SIGNIFICANT CHANGE UP (ref 0–41)
ANION GAP SERPL CALC-SCNC: 11 MMOL/L — SIGNIFICANT CHANGE UP (ref 7–14)
AST SERPL-CCNC: 38 U/L — SIGNIFICANT CHANGE UP (ref 0–41)
BASOPHILS # BLD AUTO: 0.03 K/UL — SIGNIFICANT CHANGE UP (ref 0–0.2)
BASOPHILS NFR BLD AUTO: 0.4 % — SIGNIFICANT CHANGE UP (ref 0–1)
BILIRUB SERPL-MCNC: 0.6 MG/DL — SIGNIFICANT CHANGE UP (ref 0.2–1.2)
BUN SERPL-MCNC: 22 MG/DL — HIGH (ref 10–20)
CALCIUM SERPL-MCNC: 8.8 MG/DL — SIGNIFICANT CHANGE UP (ref 8.4–10.5)
CHLORIDE SERPL-SCNC: 103 MMOL/L — SIGNIFICANT CHANGE UP (ref 98–110)
CHOLEST SERPL-MCNC: 178 MG/DL — SIGNIFICANT CHANGE UP
CO2 SERPL-SCNC: 28 MMOL/L — SIGNIFICANT CHANGE UP (ref 17–32)
CREAT SERPL-MCNC: 0.9 MG/DL — SIGNIFICANT CHANGE UP (ref 0.7–1.5)
CULTURE RESULTS: SIGNIFICANT CHANGE UP
D DIMER BLD IA.RAPID-MCNC: 230 NG/ML DDU — HIGH
EGFR: 65 ML/MIN/1.73M2 — SIGNIFICANT CHANGE UP
EOSINOPHIL # BLD AUTO: 0.18 K/UL — SIGNIFICANT CHANGE UP (ref 0–0.7)
EOSINOPHIL NFR BLD AUTO: 2.5 % — SIGNIFICANT CHANGE UP (ref 0–8)
ESTIMATED AVERAGE GLUCOSE: 114 MG/DL — SIGNIFICANT CHANGE UP (ref 68–114)
FOLATE SERPL-MCNC: >20 NG/ML — SIGNIFICANT CHANGE UP
GLUCOSE SERPL-MCNC: 101 MG/DL — HIGH (ref 70–99)
HCT VFR BLD CALC: 35.1 % — LOW (ref 37–47)
HDLC SERPL-MCNC: 80 MG/DL — SIGNIFICANT CHANGE UP
HGB BLD-MCNC: 11.5 G/DL — LOW (ref 12–16)
IMM GRANULOCYTES NFR BLD AUTO: 0.3 % — SIGNIFICANT CHANGE UP (ref 0.1–0.3)
LIPID PNL WITH DIRECT LDL SERPL: 82 MG/DL — SIGNIFICANT CHANGE UP
LYMPHOCYTES # BLD AUTO: 1 K/UL — LOW (ref 1.2–3.4)
LYMPHOCYTES # BLD AUTO: 13.9 % — LOW (ref 20.5–51.1)
MAGNESIUM SERPL-MCNC: 2.2 MG/DL — SIGNIFICANT CHANGE UP (ref 1.8–2.4)
MCHC RBC-ENTMCNC: 31.8 PG — HIGH (ref 27–31)
MCHC RBC-ENTMCNC: 32.8 G/DL — SIGNIFICANT CHANGE UP (ref 32–37)
MCV RBC AUTO: 97 FL — SIGNIFICANT CHANGE UP (ref 81–99)
MONOCYTES # BLD AUTO: 0.69 K/UL — HIGH (ref 0.1–0.6)
MONOCYTES NFR BLD AUTO: 9.6 % — HIGH (ref 1.7–9.3)
NEUTROPHILS # BLD AUTO: 5.25 K/UL — SIGNIFICANT CHANGE UP (ref 1.4–6.5)
NEUTROPHILS NFR BLD AUTO: 73.3 % — SIGNIFICANT CHANGE UP (ref 42.2–75.2)
NON HDL CHOLESTEROL: 98 MG/DL — SIGNIFICANT CHANGE UP
NRBC # BLD: 0 /100 WBCS — SIGNIFICANT CHANGE UP (ref 0–0)
PHOSPHATE SERPL-MCNC: 3.9 MG/DL — SIGNIFICANT CHANGE UP (ref 2.1–4.9)
PLATELET # BLD AUTO: 218 K/UL — SIGNIFICANT CHANGE UP (ref 130–400)
POTASSIUM SERPL-MCNC: 5 MMOL/L — SIGNIFICANT CHANGE UP (ref 3.5–5)
POTASSIUM SERPL-SCNC: 5 MMOL/L — SIGNIFICANT CHANGE UP (ref 3.5–5)
PROT SERPL-MCNC: 6.2 G/DL — SIGNIFICANT CHANGE UP (ref 6–8)
RBC # BLD: 3.62 M/UL — LOW (ref 4.2–5.4)
RBC # FLD: 14.6 % — HIGH (ref 11.5–14.5)
SODIUM SERPL-SCNC: 142 MMOL/L — SIGNIFICANT CHANGE UP (ref 135–146)
SPECIMEN SOURCE: SIGNIFICANT CHANGE UP
TRIGL SERPL-MCNC: 74 MG/DL — SIGNIFICANT CHANGE UP
TSH SERPL-MCNC: 2.89 UIU/ML — SIGNIFICANT CHANGE UP (ref 0.27–4.2)
VIT B12 SERPL-MCNC: >2000 PG/ML — HIGH (ref 232–1245)
WBC # BLD: 7.17 K/UL — SIGNIFICANT CHANGE UP (ref 4.8–10.8)
WBC # FLD AUTO: 7.17 K/UL — SIGNIFICANT CHANGE UP (ref 4.8–10.8)

## 2023-02-11 PROCEDURE — 95819 EEG AWAKE AND ASLEEP: CPT | Mod: 26

## 2023-02-11 PROCEDURE — 99233 SBSQ HOSP IP/OBS HIGH 50: CPT

## 2023-02-11 RX ORDER — LEVOTHYROXINE SODIUM 125 MCG
88 TABLET ORAL DAILY
Refills: 0 | Status: DISCONTINUED | OUTPATIENT
Start: 2023-02-11 | End: 2023-02-12

## 2023-02-11 RX ADMIN — MONTELUKAST 10 MILLIGRAM(S): 4 TABLET, CHEWABLE ORAL at 12:23

## 2023-02-11 RX ADMIN — CEFTRIAXONE 100 MILLIGRAM(S): 500 INJECTION, POWDER, FOR SOLUTION INTRAMUSCULAR; INTRAVENOUS at 05:04

## 2023-02-11 RX ADMIN — MORPHINE SULFATE 30 MILLIGRAM(S): 50 CAPSULE, EXTENDED RELEASE ORAL at 22:00

## 2023-02-11 RX ADMIN — ENOXAPARIN SODIUM 40 MILLIGRAM(S): 100 INJECTION SUBCUTANEOUS at 17:15

## 2023-02-11 RX ADMIN — ROPINIROLE 1 MILLIGRAM(S): 8 TABLET, FILM COATED, EXTENDED RELEASE ORAL at 05:05

## 2023-02-11 RX ADMIN — PANTOPRAZOLE SODIUM 40 MILLIGRAM(S): 20 TABLET, DELAYED RELEASE ORAL at 05:05

## 2023-02-11 RX ADMIN — Medication 500 UNIT(S): at 12:24

## 2023-02-11 RX ADMIN — Medication 88 MICROGRAM(S): at 21:14

## 2023-02-11 RX ADMIN — CARBIDOPA AND LEVODOPA 1 TABLET(S): 25; 100 TABLET ORAL at 05:05

## 2023-02-11 RX ADMIN — Medication 325 MILLIGRAM(S): at 12:23

## 2023-02-11 RX ADMIN — Medication 1 TABLET(S): at 05:04

## 2023-02-11 RX ADMIN — LORATADINE 10 MILLIGRAM(S): 10 TABLET ORAL at 12:23

## 2023-02-11 RX ADMIN — MORPHINE SULFATE 15 MILLIGRAM(S): 50 CAPSULE, EXTENDED RELEASE ORAL at 12:24

## 2023-02-11 RX ADMIN — MORPHINE SULFATE 30 MILLIGRAM(S): 50 CAPSULE, EXTENDED RELEASE ORAL at 21:14

## 2023-02-11 RX ADMIN — AZITHROMYCIN 255 MILLIGRAM(S): 500 TABLET, FILM COATED ORAL at 14:47

## 2023-02-11 RX ADMIN — MAGNESIUM OXIDE 400 MG ORAL TABLET 400 MILLIGRAM(S): 241.3 TABLET ORAL at 17:14

## 2023-02-11 RX ADMIN — ESCITALOPRAM OXALATE 20 MILLIGRAM(S): 10 TABLET, FILM COATED ORAL at 12:23

## 2023-02-11 RX ADMIN — Medication 5 MILLIGRAM(S): at 21:14

## 2023-02-11 RX ADMIN — Medication 40 MILLIEQUIVALENT(S): at 12:22

## 2023-02-11 RX ADMIN — RASAGILINE 1 MILLIGRAM(S): 0.5 TABLET ORAL at 12:28

## 2023-02-11 RX ADMIN — CARBIDOPA AND LEVODOPA 1 TABLET(S): 25; 100 TABLET ORAL at 17:14

## 2023-02-11 RX ADMIN — PREGABALIN 1000 MICROGRAM(S): 225 CAPSULE ORAL at 12:28

## 2023-02-11 RX ADMIN — ROPINIROLE 1 MILLIGRAM(S): 8 TABLET, FILM COATED, EXTENDED RELEASE ORAL at 17:14

## 2023-02-11 NOTE — PROGRESS NOTE ADULT - SUBJECTIVE AND OBJECTIVE BOX
YONATAN MEADE 82y Female  MRN#: [id number]     Hospital Day: 2d    Pt is currently admitted with the primary diagnosis of suspected seizure episode    SUBJECTIVE    Patient seen and examined at bedside. Resting comfortably in NAD. No complaints, patient has not had any "shaking episodes." No complaints. Saturating well on room air this morning                                           ----------------------------------------------------------  OBJECTIVE  PAST MEDICAL & SURGICAL HISTORY  Hypothyroidism  no recent dosage changes    GERD (gastroesophageal reflux disease)    Psoriatic arthritis    Tremors of nervous system  essential tremors    Fibromyalgia    Rheumatoid arthritis    Osteoarthritis    Obesity  s/p gastric bypass ~15 yrs ago ~100 lb loss    DWAYNE on CPAP    Seasonal allergies    Pulmonary embolism    H/O gastric bypass    Ankle deformity  s/p b/l surgical repair                                              -----------------------------------------------------------  ALLERGIES:  adhesives (Pruritus; Rash)  Betadine (Flushing; Pruritus)  NSAIDs (Vomiting; Rash; Nausea)                                            ------------------------------------------------------------    HOME MEDICATIONS  Home Medications:  bacitracin 500 units/g topical ointment: 1 application topically 2 times a day (08 May 2018 10:34)  carbidopa-levodopa 50 mg-200 mg oral tablet, extended release: 1 tab(s) orally 2 times a day (10 Feb 2023 06:50)  levothyroxine 88 mcg (0.088 mg) oral tablet: 1 tab(s) orally once a day (10 Feb 2023 06:51)  Lexapro 20 mg oral tablet: 1 tab(s) orally once a day (10 Feb 2023 06:54)  montelukast 10 mg oral tablet: 1 tab(s) orally once a day (08 May 2018 10:34)  morphine 15 mg/12 hr oral tablet, extended release: 1 tab(s) orally once a day, As Needed (10 Feb 2023 06:53)  morphine 30 mg/12 hr oral tablet, extended release: 1 tab(s) orally once a day (at bedtime) (10 Feb 2023 06:53)  rasagiline 1 mg oral tablet: 1 tab(s) orally once a day (10 Feb 2023 06:52)  Rinvoq 15 mg oral tablet, extended release: 1 tab(s) orally once a day (10 Feb 2023 06:52)  rOPINIRole 1 mg oral tablet: 1 tab(s) orally 2 times a day (10 Feb 2023 06:51)  Savella 25 mg oral tablet: 1 tab(s) orally 2 times a day (08 May 2018 10:34)                           MEDICATIONS:  STANDING MEDICATIONS  azithromycin  IVPB 500 milliGRAM(s) IV Intermittent every 24 hours  calcium carbonate    500 mG (Tums) Chewable 1 Tablet(s) Chew two times a day  carbidopa/levodopa CR 50/200 1 Tablet(s) Oral two times a day  cefTRIAXone   IVPB 1000 milliGRAM(s) IV Intermittent every 24 hours  cholecalciferol 500 Unit(s) Oral daily  cyanocobalamin 1000 MICROGram(s) Oral daily  enoxaparin Injectable 40 milliGRAM(s) SubCutaneous every 24 hours  escitalopram 20 milliGRAM(s) Oral daily  ferrous    sulfate 325 milliGRAM(s) Oral daily  levothyroxine 88 MICROGram(s) Oral daily  loratadine 10 milliGRAM(s) Oral daily  magnesium oxide 400 milliGRAM(s) Oral with dinner  melatonin 5 milliGRAM(s) Oral at bedtime  montelukast 10 milliGRAM(s) Oral daily  morphine ER Tablet 30 milliGRAM(s) Oral at bedtime  morphine ER Tablet 15 milliGRAM(s) Oral daily  pantoprazole    Tablet 40 milliGRAM(s) Oral before breakfast  potassium chloride    Tablet ER 40 milliEquivalent(s) Oral daily  rasagiline Tablet 1 milliGRAM(s) Oral daily  rOPINIRole 1 milliGRAM(s) Oral two times a day    PRN MEDICATIONS  acetaminophen     Tablet .. 650 milliGRAM(s) Oral every 6 hours PRN                                            ------------------------------------------------------------  VITAL SIGNS: Last 24 Hours  Vital Signs Last 24 Hrs  T(C): 36.8 (11 Feb 2023 04:37), Max: 36.8 (10 Feb 2023 14:03)  T(F): 98.3 (11 Feb 2023 04:37), Max: 98.3 (11 Feb 2023 04:37)  HR: 82 (11 Feb 2023 04:37) (80 - 82)  BP: 117/59 (11 Feb 2023 04:37) (117/59 - 122/58)  BP(mean): --  RR: 18 (11 Feb 2023 04:37) (18 - 18)  SpO2: 96% (11 Feb 2023 08:58) (91% - 96%)    Parameters below as of 11 Feb 2023 08:58  Patient On (Oxygen Delivery Method): room air                                           --------------------------------------------------------------  LABS:               LABS:                        11.5   7.17  )-----------( 218      ( 11 Feb 2023 07:40 )             35.1     02-11    142  |  103  |  22<H>  ----------------------------<  101<H>  5.0   |  28  |  0.9    Ca    8.8      11 Feb 2023 07:40  Phos  3.9     02-11  Mg     2.2     02-11    TPro  6.2  /  Alb  3.8  /  TBili  0.6  /  DBili  x   /  AST  38  /  ALT  7   /  AlkPhos  74  02-11          Culture - Urine (collected 09 Feb 2023 20:54)  Source: Clean Catch Clean Catch (Midstream)  Final Report (11 Feb 2023 09:56):    <10,000 CFU/mL Normal Urogenital Soraya    Culture - Blood (collected 09 Feb 2023 19:51)  Source: .Blood Blood-Peripheral  Preliminary Report (11 Feb 2023 03:01):    No growth to date.    Culture - Blood (collected 09 Feb 2023 19:51)  Source: .Blood Blood-Peripheral  Preliminary Report (11 Feb 2023 03:01):    No growth to date.        CARDIAC MARKERS ( 09 Feb 2023 17:27 )  x     / <0.01 ng/mL / 97 U/L / x     / x                                                  -------------------------------------------------------------  RADIOLOGY:                                            --------------------------------------------------------------    PHYSICAL EXAM:  GENERAL: NAD, well-groomed, well-developed  HEAD:  Atraumatic, Normocephalic  EYES: EOMI, PERRLA, conjunctiva and sclera clear  ENMT: No tonsillar erythema, exudates, or enlargement; Moist mucous membranes  NECK: Supple, No JVD, Normal thyroid  HEART: Regular rate and rhythm; Pansystolic murmur present; loudest at Aortic region  RESPIRATORY: Inspiratory crepts + - L>R, possibly dependent   ABDOMEN: Soft, Nontender, Nondistended; Bowel sounds present  NEUROLOGY: A&Ox3, nonfocal, moving all extremities  EXTREMITIES:  2+ Peripheral Pulses, No clubbing, cyanosis, or edema; pain in B/L LE on palpation ,   SKIN: warm, dry, normal color, no rash or abnormal lesions                                           --------------------------------------------------------------    ASSESSMENT & PLAN    82-year-old female with past medical history pulmonary embolism (No AC), DWAYNE on CPAP, obesity, OA, RA, h/o seizures(no AEDs, 10 yrs back) and h/o Back surgery for compression fractures(2018) on opiods  is brought to the ED for suspected seizure episode (2/9).    # Shaking episode / chills 2/2 infection   # r/o seizures  - Unwitnessed with urinary incontinence, headache and confusion   - elevated WBC's - 15K; AST - 113; Lactates - 1.9   - Afebrile, no localizing symptoms    - CXR - bibasilar opacities   - UA - negative   - RVP - negative   - Bld cx x 2 and urine cx - awaited   - Ct head - wnl  - Electrolytes - normal  - EEG  and neuro recs - awaited   - saturating well on room air now   - c/w rocephin 1gm iv q24 + azithro 50omg iv q24 for suspected bronchitis vs. PNA    #Pansystolic murmur - new   - loudest at aortic region  - No known cardiac history   - No complaints  - Crepts +; L>R; dependent - possible atelectesis; no signs of failure  - On O2 @2lpm in ED - wean  - ECG - sinus tachy, no new changes  - Trops - negative  - f/u TTE     # Parkinson's disease   - c/w sinemet 50/200mg ER BID     # Fibromyalgia   - c/w Savella 25mg BID    #Hypothyroidism   - c/w synthroid 88mcg   - f/u AM TSH    #DWAYNE   - on CPAP    #RA  - hold Rinvoq 15mg till infection ruled out      #h/o seizures(no AEDs, 10 yrs back)  - awaiting EEG and neuro recs     #H/o Back surgery for compression fractures(2018)   #OA  - Morphine ER for chronic pain  - c/w gabi+d3 400/500  - bowel regimen prn     #Misc  - VTE ppx - lovenox   - GI ppx - pantop  - Activity - as tolerated with walker, PT eval completed   - Diet - DASH  - Dispo - home with home care     
  YONATAN MEADE  82y  Female  ***My note supersedes ALL resident notes that I sign.  My corrections for their notes are in my note.***    I can be reached directly on 4[address] [phone]. My office number is [phone]. My personal cell number is [phone].    INTERVAL EVENTS: Here for f/u of shaking chills. Pt feeling better. Cough passing. No phlegm. No urine symp.    T(F): 98.2 (02-10-23 @ 14:03), Max: 98.5 (02-10-23 @ 00:45)  HR: 82 (02-10-23 @ 14:03) (82 - 95)  BP: 122/58 (02-10-23 @ 14:03) (122/58 - 133/63)  RR: 18 (02-10-23 @ 14:03) (18 - 18)  SpO2: 91% (02-10-23 @ 14:03) (91% - 100%)    Gen: NAD  HEENT: PERRL, EOMI, mouth clr, nose clr  Neck: no nodes, no JVD, thyroid nl  lungs: clr  hrt: s1 s2 rrr no murmur  abd: soft, NT/ND, no HS megaly  ext: no edema, no c/c  neuro: aa ox3, cn intact, can move all 4 ext    LABS:                      11.6    (    96.2   10.71 )-----------( ---------      212      ( 10 Feb 2023 11:22 )             35.2    (    14.2     WBC Count: 10.71 K/uL (02-10-23 @ 11:22)  WBC Count: 15.55 K/uL (23 @ 17:27)    136   (   99   (   153      02-10-23 @ 11:22  ----------------------               4.3   (   25   (   29                             -----                        0.9  Ca  9.0   Mg  --    P   --     LFT  6.2  (  1.2  (  51       02-10-23 @ 11:22  -------------------------  3.9  (  68  (  68    Alb 3.9  T susan 1.2   AP 68  AST 51  ALT 68  02-10-23 @ 11:22 - better  Alb 4.4  T susan 0.9   AP 83    ALT 25  23 @ 17:27    CARDIAC MARKERS ( 2023 17:27 )  x     / <0.01 ng/mL / 97 U/L / x     / x        Urinalysis Basic - ( 2023 20:54 )    Color: Light Yellow / Appearance: Clear / S.019 / pH: x  Gluc: x / Ketone: Negative  / Bili: Negative / Urobili: <2 mg/dL   Blood: x / Protein: Negative / Nitrite: Negative   Leuk Esterase: Moderate / RBC: 1 /HPF / WBC 3 /HPF   Sq Epi: x / Non Sq Epi: 1 /HPF / Bacteria: Negative    RADIOLOGY & ADDITIONAL TESTS:  < from: Xray Chest 1 View- PORTABLE-Urgent (Xray Chest 1 View- PORTABLE-Urgent .) (23 @ 17:53) >  Impression:  Low lung volume. Bibasilar opacities.    < end of copied text >    < from: CT Head No Cont (23 @ 17:41) >  IMPRESSION:    No acute intracranial hemorrhage, mass-effect or midline shift.    < end of copied text >    MEDICATIONS:  azithromycin  IVPB 500 milliGRAM(s) IV Intermittent every 24 hours  cefTRIAXone   IVPB 1000 milliGRAM(s) IV Intermittent every 24 hours    acetaminophen     Tablet .. 650 milliGRAM(s) Oral every 6 hours PRN  calcium carbonate    500 mG (Tums) Chewable 1 Tablet(s) Chew two times a day  carbidopa/levodopa CR 50/200 1 Tablet(s) Oral two times a day  cholecalciferol 500 Unit(s) Oral daily  cyanocobalamin 1000 MICROGram(s) Oral daily  enoxaparin Injectable 40 milliGRAM(s) SubCutaneous every 24 hours  escitalopram 20 milliGRAM(s) Oral daily  ferrous    sulfate 325 milliGRAM(s) Oral daily  levothyroxine 88 MICROGram(s) Oral daily  loratadine 10 milliGRAM(s) Oral daily  magnesium oxide 400 milliGRAM(s) Oral with dinner  melatonin 5 milliGRAM(s) Oral at bedtime  montelukast 10 milliGRAM(s) Oral daily  morphine ER Tablet 30 milliGRAM(s) Oral at bedtime  pantoprazole    Tablet 40 milliGRAM(s) Oral before breakfast  potassium chloride    Tablet ER 40 milliEquivalent(s) Oral daily  rasagiline Tablet 1 milliGRAM(s) Oral daily  rOPINIRole 1 milliGRAM(s) Oral two times a day  
  YONATAN MEADE  82y  Female      Patient is a 82y old  Female who presents with a chief complaint of suspected seizures (2023 13:31)      INTERVAL HPI/OVERNIGHT EVENTS:  She feels ok, still with cough, no fever.   Vital Signs Last 24 Hrs  T(C): 36.8 (2023 04:37), Max: 36.8 (10 Feb 2023 14:03)  T(F): 98.3 (2023 04:37), Max: 98.3 (2023 04:37)  HR: 82 (2023 04:37) (80 - 82)  BP: 117/59 (2023 04:37) (117/59 - 122/58)  BP(mean): --  RR: 18 (2023 04:37) (18 - 18)  SpO2: 96% (2023 08:58) (91% - 96%)    Parameters below as of 2023 08:58  Patient On (Oxygen Delivery Method): room air                Consultant(s) Notes Reviewed:  [x ] YES  [ ] NO          MEDICATIONS  (STANDING):  azithromycin  IVPB 500 milliGRAM(s) IV Intermittent every 24 hours  calcium carbonate    500 mG (Tums) Chewable 1 Tablet(s) Chew two times a day  carbidopa/levodopa CR 50/200 1 Tablet(s) Oral two times a day  cefTRIAXone   IVPB 1000 milliGRAM(s) IV Intermittent every 24 hours  cholecalciferol 500 Unit(s) Oral daily  cyanocobalamin 1000 MICROGram(s) Oral daily  enoxaparin Injectable 40 milliGRAM(s) SubCutaneous every 24 hours  escitalopram 20 milliGRAM(s) Oral daily  ferrous    sulfate 325 milliGRAM(s) Oral daily  levothyroxine 88 MICROGram(s) Oral daily  loratadine 10 milliGRAM(s) Oral daily  magnesium oxide 400 milliGRAM(s) Oral with dinner  melatonin 5 milliGRAM(s) Oral at bedtime  montelukast 10 milliGRAM(s) Oral daily  morphine ER Tablet 30 milliGRAM(s) Oral at bedtime  morphine ER Tablet 15 milliGRAM(s) Oral daily  pantoprazole    Tablet 40 milliGRAM(s) Oral before breakfast  potassium chloride    Tablet ER 40 milliEquivalent(s) Oral daily  rasagiline Tablet 1 milliGRAM(s) Oral daily  rOPINIRole 1 milliGRAM(s) Oral two times a day    MEDICATIONS  (PRN):  acetaminophen     Tablet .. 650 milliGRAM(s) Oral every 6 hours PRN Temp greater or equal to 38C (100.4F), Mild Pain (1 - 3)      LABS                          11.5   7.17  )-----------( 218      ( 2023 07:40 )             35.1         142  |  103  |  22<H>  ----------------------------<  101<H>  5.0   |  28  |  0.9    Ca    8.8      2023 07:40  Phos  3.9       Mg     2.2         TPro  6.2  /  Alb  3.8  /  TBili  0.6  /  DBili  x   /  AST  38  /  ALT  7   /  AlkPhos  74        Urinalysis Basic - ( 2023 20:54 )    Color: Light Yellow / Appearance: Clear / S.019 / pH: x  Gluc: x / Ketone: Negative  / Bili: Negative / Urobili: <2 mg/dL   Blood: x / Protein: Negative / Nitrite: Negative   Leuk Esterase: Moderate / RBC: 1 /HPF / WBC 3 /HPF   Sq Epi: x / Non Sq Epi: 1 /HPF / Bacteria: Negative        Lactate Trend   @ 19:51 Lactate:1.9     CARDIAC MARKERS ( 2023 17:27 )  x     / <0.01 ng/mL / 97 U/L / x     / x          CAPILLARY BLOOD GLUCOSE      POCT Blood Glucose.: 126 mg/dL (2023 15:58)      Culture - Urine (collected 23 @ 20:54)  Source: Clean Catch Clean Catch (Midstream)  Final Report (23 @ 09:56):    <10,000 CFU/mL Normal Urogenital Soraya    Culture - Blood (collected 23 @ 19:51)  Source: .Blood Blood-Peripheral  Preliminary Report (23 @ 03:01):    No growth to date.    Culture - Blood (collected 23 @ 19:51)  Source: .Blood Blood-Peripheral  Preliminary Report (23 @ 03:01):    No growth to date.        RADIOLOGY & ADDITIONAL TESTS:    Imaging Personally Reviewed:  [ ] YES  [ ] NO    HEALTH ISSUES - PROBLEM Dx:          PHYSICAL EXAM:  GENERAL: NAD, well-developed.  HEAD:  Atraumatic, Normocephalic.  EYES: EOMI, PERRLA, conjunctiva and sclera clear.  NECK: Supple, No JVD.  CHEST/LUNG: Bibasilar rales.   HEART: Regular rate and rhythm; S1 S2.   ABDOMEN: Soft, Nontender, Nondistended; Bowel sounds present.  EXTREMITIES:  2+ Peripheral Pulses, No clubbing, cyanosis, or edema.  PSYCH: AAOx3.  NEUROLOGY: non-focal.  SKIN: No rashes or lesions.

## 2023-02-11 NOTE — PROGRESS NOTE ADULT - ASSESSMENT
82-year-old female with past medical history pulmonary embolism (No AC), DWAYNE on CPAP, obesity, OA, RA, h/o seizures(no AEDs, 10 yrs back) and h/o Back surgery for compression fractures(2018) on opiods  is brought to the ED for suspected seizure episode (2/9).    # Shaking episode / chills 2/2 infection   # r/o seizures  - Unwitnessed with urinary incontinence, headache and confusion   - elevated WBC's - 15K; AST - 113; Lactates - 1.9   - Afebrile, no localizing symptoms    - CXR - bibasilar opacities   - UA - negative   - RVP - negative   - Bld cx x 2 and urine cx - awaited   - Ct head - wnl  - Electrolytes - normal  - EEG  and neuro recs - awaited   - saturating well on room air now   - c/w rocephin 1gm iv q24 + azithro 50omg iv q24 for suspected bronchitis vs. PNA    #Pansystolic murmur - new   - loudest at aortic region  - No known cardiac history   - No complaints  - Crepts +; L>R; dependent - possible atelectesis; no signs of failure  - On O2 @2lpm in ED - wean  - ECG - sinus tachy, no new changes  - Trops - negative  - f/u TTE     # Parkinson's disease   - c/w sinemet 50/200mg ER BID     # Fibromyalgia   - c/w Savella 25mg BID    #Hypothyroidism   - c/w synthroid 88mcg   - f/u AM TSH    #DWAYNE   - on CPAP    #RA  - hold Rinvoq 15mg till infection ruled out      #h/o seizures(no AEDs, 10 yrs back)  - awaiting EEG and neuro recs     #H/o Back surgery for compression fractures(2018)   #OA  - Morphine ER for chronic pain  - c/w gabi+d3 400/500  - bowel regimen prn     DVT prophylaxis: Lovenox SC.    82-year-old female with past medical history pulmonary embolism (No AC), DWAYNE on CPAP, obesity, OA, RA, h/o seizures(no AEDs, 10 yrs back) and h/o Back surgery for compression fractures(2018) on opiods  is brought to the ED for suspected seizure episode (2/9).    A/P:   Shaking episode  Could be from chills and fever, Seizure is less likely.   EEG negative.     Leukocytosis:   Possible Aspiration pneumonitis :  WBC is improving, CXR showed bibasilar opacities.   UA - negative, RVP - negative   On Rocephin and Zithromax, if cx negative will discontinue antibiotics.     Parkinson's disease   sinemet 50/200mg ER BID     # Fibromyalgia   - c/w Savella 25mg BID    Hypothyroidism   - c/w synthroid 88mcg       DWAYNE on CPAP    RA  Resume Rinvoq 15mg      Chronic Back Pain: due tor compression fractures(2018)   Morphine ER for chronic pain    DVT prophylaxis: Lovenox SC.   Likely discharge home in 24 hrs if remained stable.

## 2023-02-12 ENCOUNTER — TRANSCRIPTION ENCOUNTER (OUTPATIENT)
Age: 83
End: 2023-02-12

## 2023-02-12 VITALS
SYSTOLIC BLOOD PRESSURE: 114 MMHG | RESPIRATION RATE: 18 BRPM | HEART RATE: 83 BPM | TEMPERATURE: 97 F | DIASTOLIC BLOOD PRESSURE: 67 MMHG

## 2023-02-12 LAB
ALBUMIN SERPL ELPH-MCNC: 3.8 G/DL — SIGNIFICANT CHANGE UP (ref 3.5–5.2)
ALP SERPL-CCNC: 75 U/L — SIGNIFICANT CHANGE UP (ref 30–115)
ALT FLD-CCNC: 9 U/L — SIGNIFICANT CHANGE UP (ref 0–41)
ANION GAP SERPL CALC-SCNC: 10 MMOL/L — SIGNIFICANT CHANGE UP (ref 7–14)
AST SERPL-CCNC: 50 U/L — HIGH (ref 0–41)
BASOPHILS # BLD AUTO: 0.02 K/UL — SIGNIFICANT CHANGE UP (ref 0–0.2)
BASOPHILS NFR BLD AUTO: 0.3 % — SIGNIFICANT CHANGE UP (ref 0–1)
BILIRUB SERPL-MCNC: 0.7 MG/DL — SIGNIFICANT CHANGE UP (ref 0.2–1.2)
BUN SERPL-MCNC: 15 MG/DL — SIGNIFICANT CHANGE UP (ref 10–20)
CALCIUM SERPL-MCNC: 8.3 MG/DL — LOW (ref 8.4–10.5)
CHLORIDE SERPL-SCNC: 103 MMOL/L — SIGNIFICANT CHANGE UP (ref 98–110)
CO2 SERPL-SCNC: 26 MMOL/L — SIGNIFICANT CHANGE UP (ref 17–32)
CREAT SERPL-MCNC: 1 MG/DL — SIGNIFICANT CHANGE UP (ref 0.7–1.5)
EGFR: 57 ML/MIN/1.73M2 — LOW
EOSINOPHIL # BLD AUTO: 0.13 K/UL — SIGNIFICANT CHANGE UP (ref 0–0.7)
EOSINOPHIL NFR BLD AUTO: 2 % — SIGNIFICANT CHANGE UP (ref 0–8)
GLUCOSE SERPL-MCNC: 100 MG/DL — HIGH (ref 70–99)
HCT VFR BLD CALC: 35.2 % — LOW (ref 37–47)
HGB BLD-MCNC: 11.5 G/DL — LOW (ref 12–16)
IMM GRANULOCYTES NFR BLD AUTO: 0.5 % — HIGH (ref 0.1–0.3)
LYMPHOCYTES # BLD AUTO: 0.95 K/UL — LOW (ref 1.2–3.4)
LYMPHOCYTES # BLD AUTO: 14.6 % — LOW (ref 20.5–51.1)
MAGNESIUM SERPL-MCNC: 2.3 MG/DL — SIGNIFICANT CHANGE UP (ref 1.8–2.4)
MCHC RBC-ENTMCNC: 31.9 PG — HIGH (ref 27–31)
MCHC RBC-ENTMCNC: 32.7 G/DL — SIGNIFICANT CHANGE UP (ref 32–37)
MCV RBC AUTO: 97.8 FL — SIGNIFICANT CHANGE UP (ref 81–99)
MONOCYTES # BLD AUTO: 0.71 K/UL — HIGH (ref 0.1–0.6)
MONOCYTES NFR BLD AUTO: 10.9 % — HIGH (ref 1.7–9.3)
NEUTROPHILS # BLD AUTO: 4.68 K/UL — SIGNIFICANT CHANGE UP (ref 1.4–6.5)
NEUTROPHILS NFR BLD AUTO: 71.7 % — SIGNIFICANT CHANGE UP (ref 42.2–75.2)
NRBC # BLD: 0 /100 WBCS — SIGNIFICANT CHANGE UP (ref 0–0)
PLATELET # BLD AUTO: 225 K/UL — SIGNIFICANT CHANGE UP (ref 130–400)
POTASSIUM SERPL-MCNC: 4.9 MMOL/L — SIGNIFICANT CHANGE UP (ref 3.5–5)
POTASSIUM SERPL-SCNC: 4.9 MMOL/L — SIGNIFICANT CHANGE UP (ref 3.5–5)
PROT SERPL-MCNC: 6.2 G/DL — SIGNIFICANT CHANGE UP (ref 6–8)
RBC # BLD: 3.6 M/UL — LOW (ref 4.2–5.4)
RBC # FLD: 14.2 % — SIGNIFICANT CHANGE UP (ref 11.5–14.5)
SODIUM SERPL-SCNC: 139 MMOL/L — SIGNIFICANT CHANGE UP (ref 135–146)
WBC # BLD: 6.52 K/UL — SIGNIFICANT CHANGE UP (ref 4.8–10.8)
WBC # FLD AUTO: 6.52 K/UL — SIGNIFICANT CHANGE UP (ref 4.8–10.8)

## 2023-02-12 PROCEDURE — 71045 X-RAY EXAM CHEST 1 VIEW: CPT | Mod: 26

## 2023-02-12 PROCEDURE — 99239 HOSP IP/OBS DSCHRG MGMT >30: CPT

## 2023-02-12 RX ADMIN — ROPINIROLE 1 MILLIGRAM(S): 8 TABLET, FILM COATED, EXTENDED RELEASE ORAL at 05:37

## 2023-02-12 RX ADMIN — PANTOPRAZOLE SODIUM 40 MILLIGRAM(S): 20 TABLET, DELAYED RELEASE ORAL at 05:36

## 2023-02-12 RX ADMIN — Medication 500 UNIT(S): at 11:11

## 2023-02-12 RX ADMIN — Medication 1 TABLET(S): at 05:37

## 2023-02-12 RX ADMIN — Medication 325 MILLIGRAM(S): at 11:12

## 2023-02-12 RX ADMIN — PREGABALIN 1000 MICROGRAM(S): 225 CAPSULE ORAL at 11:13

## 2023-02-12 RX ADMIN — RASAGILINE 1 MILLIGRAM(S): 0.5 TABLET ORAL at 11:12

## 2023-02-12 RX ADMIN — CARBIDOPA AND LEVODOPA 1 TABLET(S): 25; 100 TABLET ORAL at 05:37

## 2023-02-12 RX ADMIN — ESCITALOPRAM OXALATE 20 MILLIGRAM(S): 10 TABLET, FILM COATED ORAL at 11:11

## 2023-02-12 RX ADMIN — MORPHINE SULFATE 15 MILLIGRAM(S): 50 CAPSULE, EXTENDED RELEASE ORAL at 11:11

## 2023-02-12 RX ADMIN — LORATADINE 10 MILLIGRAM(S): 10 TABLET ORAL at 11:13

## 2023-02-12 RX ADMIN — MONTELUKAST 10 MILLIGRAM(S): 4 TABLET, CHEWABLE ORAL at 11:12

## 2023-02-12 RX ADMIN — Medication 40 MILLIEQUIVALENT(S): at 11:13

## 2023-02-12 NOTE — DISCHARGE NOTE PROVIDER - NSDCCPCAREPLAN_GEN_ALL_CORE_FT
PRINCIPAL DISCHARGE DIAGNOSIS  Diagnosis: Episode of shaking  Assessment and Plan of Treatment: likely from parkinson's or chills.

## 2023-02-12 NOTE — DISCHARGE NOTE PROVIDER - POSTFACE STATEMENT FOR MINUTES SPENT
Caller: ALISON     Relationship: SELF    Best call back number: [phone]     Requested Prescriptions:   Requested Prescriptions     Pending Prescriptions Disp Refills   • isosorbide dinitrate (ISORDIL) 10 MG tablet 270 tablet 3     Sig: Take 1 tablet by mouth 3 (Three) Times a Day.        Pharmacy where request should be sent:  [facility] [phone]      Additional details provided by patient: ONLY HAS 2 DAY SUPPLY LEFT.  WOULD LIKE A 3 MONTH SUPPLY PLEASE      Does the patient have less than a 3 day supply:  [] Yes  [x] No    Chacha Phelps Rep   07/05/22 12:26 EDT     
Rx Refill Note  Requested Prescriptions     Pending Prescriptions Disp Refills   • isosorbide dinitrate (ISORDIL) 10 MG tablet 270 tablet 3     Sig: Take 1 tablet by mouth 3 (Three) Times a Day.      Last office visit with prescribing clinician: 1/18/2022      Next office visit with prescribing clinician: Visit date not found            Eliane Carcamo MA  07/05/22, 12:42 EDT  
minutes on the discharge service.

## 2023-02-12 NOTE — DISCHARGE NOTE NURSING/CASE MANAGEMENT/SOCIAL WORK - PATIENT PORTAL LINK FT
You can access the FollowMyHealth Patient Portal offered by [facility] by registering at the following website: [email or web address]. By joining [facility]’s FollowMyHealth portal, you will also be able to view your health information using other applications (apps) compatible with our system.

## 2023-02-12 NOTE — DISCHARGE NOTE PROVIDER - NSDCMRMEDTOKEN_GEN_ALL_CORE_FT
bacitracin 500 units/g topical ointment: 1 application topically 2 times a day  carbidopa-levodopa 50 mg-200 mg oral tablet, extended release: 1 tab(s) orally 2 times a day  levothyroxine 88 mcg (0.088 mg) oral tablet: 1 tab(s) orally once a day  Lexapro 20 mg oral tablet: 1 tab(s) orally once a day  montelukast 10 mg oral tablet: 1 tab(s) orally once a day  morphine 15 mg/12 hr oral tablet, extended release: 1 tab(s) orally once a day, As Needed  morphine 30 mg/12 hr oral tablet, extended release: 1 tab(s) orally once a day (at bedtime)  rasagiline 1 mg oral tablet: 1 tab(s) orally once a day  Rinvoq 15 mg oral tablet, extended release: 1 tab(s) orally once a day  rOPINIRole 1 mg oral tablet: 1 tab(s) orally 2 times a day  Savella 25 mg oral tablet: 1 tab(s) orally 2 times a day

## 2023-02-12 NOTE — DISCHARGE NOTE PROVIDER - CARE PROVIDER_API CALL
Jennifer Moncada  INTERNAL MEDICINE  [address] [place] [id number]  Phone: [phone]  Fax: [phone]  Follow Up Time: 1 month

## 2023-02-12 NOTE — DISCHARGE NOTE PROVIDER - HOSPITAL COURSE
82-year-old female with past medical history pulmonary embolism (No AC), DWAYNE on CPAP, obesity, OA, RA, h/o seizures(no AEDs, 10 yrs back) and h/o Back surgery for compression fractures(2018) on opiods  is brought to the ED for shaking episodes, patient was awake and started to to have shaking in the arms, no loss of consciousness, family was concern about seizure, she had history of seizure episode > 10 years ago and not on any medication. In the ED vital signs were stable, Head CT was negative, Neurology evaluated the patient, the  episode is less likely active seizure, EEG done which was negative, also patient found with elevated WBC, CXR showed bilateral mild opacities, she was started on Rocephin and Zithromax concerning pneumonia, WBC improved quickly, patient was feeling ok, blood cx negative, no fever, antibiotics discontinued, no further shaking episode, patient was stable for discharge.     PHYSICAL EXAM:  GENERAL: NAD, well-developed.  HEAD:  Atraumatic, Normocephalic.  EYES: EOMI, PERRLA, conjunctiva and sclera clear.  NECK: Supple, No JVD.  CHEST/LUNG: Bibasilar rales.   HEART: Regular rate and rhythm; S1 S2.   ABDOMEN: Soft, Nontender, Nondistended; Bowel sounds present.  EXTREMITIES:  2+ Peripheral Pulses, No clubbing, cyanosis, or edema.  PSYCH: AAOx3.  NEUROLOGY: non-focal.  SKIN: No rashes or lesions.      A/P:   Shaking episode  Could be from chills and fever, Seizure is less likely.   EEG negative.     Leukocytosis:   Possible Aspiration pneumonitis :  WBC is improving, CXR showed bibasilar opacities.   UA - negative, RVP - negative   s/p Rocephin and Zithromax,  blood cx negative, antibiotics discontinued.     Parkinson's disease   sinemet 50/200mg ER BID     Fibromyalgia : Savella 25mg BID  Hypothyroidism: continue synthroid 88mcg   DWAYNE on CPAP  Rheumatoid Arthritis: on Rinvoq 15mg     Chronic Back Pain: due tor compression fractures(2018)   Morphine ER for chronic pain

## 2023-02-15 LAB
CULTURE RESULTS: SIGNIFICANT CHANGE UP
CULTURE RESULTS: SIGNIFICANT CHANGE UP
SPECIMEN SOURCE: SIGNIFICANT CHANGE UP
SPECIMEN SOURCE: SIGNIFICANT CHANGE UP

## 2023-02-16 DIAGNOSIS — E86.0 DEHYDRATION: ICD-10-CM

## 2023-02-16 DIAGNOSIS — G93.41 METABOLIC ENCEPHALOPATHY: ICD-10-CM

## 2023-02-16 DIAGNOSIS — M06.9 RHEUMATOID ARTHRITIS, UNSPECIFIED: ICD-10-CM

## 2023-02-16 DIAGNOSIS — J96.01 ACUTE RESPIRATORY FAILURE WITH HYPOXIA: ICD-10-CM

## 2023-02-16 DIAGNOSIS — M54.9 DORSALGIA, UNSPECIFIED: ICD-10-CM

## 2023-02-16 DIAGNOSIS — R50.9 FEVER, UNSPECIFIED: ICD-10-CM

## 2023-02-16 DIAGNOSIS — E66.9 OBESITY, UNSPECIFIED: ICD-10-CM

## 2023-02-16 DIAGNOSIS — G20 PARKINSON'S DISEASE: ICD-10-CM

## 2023-02-16 DIAGNOSIS — G47.33 OBSTRUCTIVE SLEEP APNEA (ADULT) (PEDIATRIC): ICD-10-CM

## 2023-02-16 DIAGNOSIS — J98.11 ATELECTASIS: ICD-10-CM

## 2023-02-16 DIAGNOSIS — Z88.8 ALLERGY STATUS TO OTHER DRUGS, MEDICAMENTS AND BIOLOGICAL SUBSTANCES STATUS: ICD-10-CM

## 2023-02-16 DIAGNOSIS — Z98.84 BARIATRIC SURGERY STATUS: ICD-10-CM

## 2023-02-16 DIAGNOSIS — E03.9 HYPOTHYROIDISM, UNSPECIFIED: ICD-10-CM

## 2023-02-16 DIAGNOSIS — G89.29 OTHER CHRONIC PAIN: ICD-10-CM

## 2023-02-16 DIAGNOSIS — Z86.711 PERSONAL HISTORY OF PULMONARY EMBOLISM: ICD-10-CM

## 2023-02-16 DIAGNOSIS — Z91.048 OTHER NONMEDICINAL SUBSTANCE ALLERGY STATUS: ICD-10-CM

## 2023-03-28 ENCOUNTER — APPOINTMENT (OUTPATIENT)
Dept: PULMONOLOGY | Facility: CLINIC | Age: 83
End: 2023-03-28
Payer: MEDICARE

## 2023-03-28 VITALS
DIASTOLIC BLOOD PRESSURE: 78 MMHG | HEART RATE: 113 BPM | BODY MASS INDEX: 32.25 KG/M2 | WEIGHT: 160 LBS | HEIGHT: 59 IN | OXYGEN SATURATION: 93 % | SYSTOLIC BLOOD PRESSURE: 130 MMHG

## 2023-03-28 VITALS — OXYGEN SATURATION: 88 %

## 2023-03-28 PROCEDURE — 99204 OFFICE O/P NEW MOD 45 MIN: CPT | Mod: GC

## 2023-03-28 NOTE — HISTORY OF PRESENT ILLNESS
[TextBox_4] : 82-year-old female with a history of Parkinson's disease, never smoker, mild DWAYNE not on treatment, recent admission to the hospital for right lower lobe pneumonia, presenting for evaluation of shortness of breath and wheezing that have been worse since her hospitalization.  She has a history of osteoarthritis and rheumatoid arthritis and is on immunosuppressive medication.  She notes occasional wheezing but is not currently wheezing on exam.  She is short of breath especially with exertion and her O2 saturation after walking for less than 2 minutes dropped to 88%.  She had a recent chest x-ray that was unremarkable.

## 2023-03-31 RX ORDER — BUDESONIDE AND FORMOTEROL FUMARATE DIHYDRATE 160; 4.5 UG/1; UG/1
160-4.5 AEROSOL RESPIRATORY (INHALATION) TWICE DAILY
Qty: 1 | Refills: 3 | Status: ACTIVE | COMMUNITY
Start: 2023-03-28 | End: 1900-01-01

## 2023-05-16 NOTE — PHYSICAL THERAPY INITIAL EVALUATION ADULT - SPECIFY REASON(S)
VALVE EVOLUT TM FX AORTIC 29MM was deployed to the aortic valve. Pt does not have any activity order . Will f/u with PT when orders are updated.

## 2023-05-17 ENCOUNTER — APPOINTMENT (OUTPATIENT)
Dept: PULMONOLOGY | Facility: CLINIC | Age: 83
End: 2023-05-17
Payer: MEDICARE

## 2023-05-17 VITALS
WEIGHT: 160 LBS | HEIGHT: 59 IN | OXYGEN SATURATION: 92 % | BODY MASS INDEX: 32.25 KG/M2 | HEART RATE: 101 BPM | SYSTOLIC BLOOD PRESSURE: 110 MMHG | DIASTOLIC BLOOD PRESSURE: 70 MMHG

## 2023-05-17 PROCEDURE — 99213 OFFICE O/P EST LOW 20 MIN: CPT | Mod: 25

## 2023-05-17 PROCEDURE — 71046 X-RAY EXAM CHEST 2 VIEWS: CPT

## 2023-05-17 NOTE — REVIEW OF SYSTEMS
[Negative] : Endocrine [EDS] : no EDS [Cough] : no cough [Wheezing] : no wheezing [TextBox_30] : No cough or wheezing

## 2023-05-17 NOTE — HISTORY OF PRESENT ILLNESS
[TextBox_4] : 82-year-old female with a history of Parkinson's disease, never smoker, mild DWAYNE not on treatment, recent admission to the hospital for right lower lobe pneumonia, presenting for evaluation of shortness of breath and wheezing that have been worse since her hospitalization.  She has a history of osteoarthritis and rheumatoid arthritis and is on immunosuppressive medication.  She notes occasional wheezing but is not currently wheezing on exam.  She is short of breath especially with exertion and her O2 saturation after walking for less than 2 minutes dropped to 88%.  She had a recent chest x-ray that was unremarkable.\par \par 5/17/23: Since I last saw her she had anotehr aspiration event/pnsuemonia for which she was admitted to [facility]. CXR today with no appreciable infiltrates. Remains on O2 intermittetnly; symbicort, albuterol PRN. Lungs clear on exam today.

## 2023-05-17 NOTE — PHYSICAL EXAM
[No Acute Distress] : no acute distress [Normal Oropharynx] : normal oropharynx [Normal Appearance] : normal appearance [No Neck Mass] : no neck mass [Normal Rate/Rhythm] : normal rate/rhythm [Normal S1, S2] : normal s1, s2 [No Murmurs] : no murmurs [No Resp Distress] : no resp distress [Clear to Auscultation Bilaterally] : clear to auscultation bilaterally [No Abnormalities] : no abnormalities [Benign] : benign [Normal Gait] : normal gait [No Clubbing] : no clubbing [No Cyanosis] : no cyanosis [No Edema] : no edema [FROM] : FROM [Normal Color/ Pigmentation] : normal color/ pigmentation [No Focal Deficits] : no focal deficits [Oriented x3] : oriented x3 [Normal Affect] : normal affect [TextBox_2] : weak, walks with a roller, parkinsons tremor [TextBox_140] : Tremor

## 2023-05-17 NOTE — PROCEDURE
[FreeTextEntry1] : Chest PA and Lateral View\par \par Reason: \par \par Shortness of breath and cough\par \par Findings:\par \par The retro-cardial and retero-sternal spaces are clear.\par \par Both the lung fields are equally translucent.\par \par The posterior and lateral costo-phrenic angles are clear.\par \par No hilar or mediastinal mass is seen.\par \par Domes of diaphragm are normal in position and contour.\par \par The cardiac outline is normal.\par \par No obvious skeletal abnormality is seen.\par \par Impression:\par \par Atelectatic band; no consliudation \par

## 2023-07-06 NOTE — H&P PST ADULT - REASON FOR ADMISSION
GENDER AFFIRMATION CLINIC PEDIATRIC PSYCHOLOGY PROGRESS NOTE    Identifying Information  John (pronouns: she/her)   18 y.o. 11 m.o. White/Not  or /a   Assigned male at birth and currently affirms a female gender identity.   Medications & Start Dates: Puberty blockers in June 2022; estrogen in Feb 2023    Reason for Follow-Up:   Follow-up on estrogen    SUBJECTIVE  Interval history and content of current session:   Transition: John reports that she has begun to notice changes from estrogen including softening of her skin and soreness of her chest. John is very certain that this is the best direction for her, and is excited to continue and increase her dose. Her and her mother deny any concerns that this is not the correct choice.    Mental & Behavioral Health: John and her mother report that John's mood and anxiety have improved significantly with the start of estrogen therapy. John's mom notes that John remains behaviorally disengaged from activities and socialization beyond video games and online friends. John describes hesitance to apply for a job because she struggles to know what to do to interact socially when entering a room of people. John's Zoloft was increased to 50mg and she and her mom still feel it is not working. Her PCP has transferred her to a psychiatrist for enhanced psychiatric medication management.   Physical Health: John denies any significant challenges with health other than impaired sleep from depression. Appetite is within normal range. She denies any physical activity.   Not sure it is helping.  Social Support: John and her family have not been as engaged in their Che[employer]ism or social lives due to caring for John's nonverbal younger sibling.   School/Work: John has been spending their time since graduation talking to friends and her boyfriend on the internet, watching youtube, or  playing games. Her mom would like to see more school, more work, and more in person  hangouts.     Current Therapist: Online therapist, paid a bunch of no show fees; [employer]ISM meeting; [employer] prom  Current Psychiatrist: New psychiatrist    OBJECTIVE  Interventions used:   Education on pharmacology for anxiety and depression  Motivational interviewing  Behavioral intervention: identifying steps for behavioral activation    Mental status changes: Mental status is comparable to initial evaluation. Noted changes include dysthymic and anxious affect, good insight, inattentive, restless. Patient did not report suicidal or homicidal ideation.     Diagnostic Impressions  Based on the diagnostic evaluation and background information provided, the current diagnoses are:     ICD-10-CM ICD-9-CM   1. Autism spectrum disorder requiring support (level 1)  F84.0 299.00   2. Attention deficit hyperactivity disorder (ADHD), combined type  F90.2 314.01   3. Generalized anxiety disorder  F41.1 300.02   4. Recurrent major depressive disorder, in partial remission  F33.41 296.35   5. Gender dysphoria of adolescence  F64.0 302.85       PLAN  Parent/Child Recommendations: behavioral activation: applying for jobs and talking to   Referrals: Psychiatry for medication management for anxiety and depression; Social Skills Groups; Therapist  Follow-Up: 3 months    Length of Service (minutes): 60    This session involved Interactive Complexity (95081); that is, specific communication factors complicated the delivery of the procedure.  Specifically, evaluation participant emotions interfered with understanding and ability to assist with providing information about the patient.        Will Knutson, Ph.D.  Pediatric Psychologist  [facility]          78 yo female presents with c/o "crushed vertabrae in my back for months " for kyphoplasty; denies cp,palp,sob,dyspnea,dysuria; difficult to assess ex nathalia: pt w/ severe back pain& limited mobility, denies any sob

## 2023-07-28 RX ORDER — MONTELUKAST 10 MG/1
10 TABLET, FILM COATED ORAL
Qty: 30 | Refills: 3 | Status: ACTIVE | COMMUNITY
Start: 2023-07-26 | End: 1900-01-01

## 2023-08-23 ENCOUNTER — APPOINTMENT (OUTPATIENT)
Dept: OTOLARYNGOLOGY | Facility: CLINIC | Age: 83
End: 2023-08-23
Payer: MEDICARE

## 2023-08-23 PROCEDURE — 31575 DIAGNOSTIC LARYNGOSCOPY: CPT

## 2023-08-23 PROCEDURE — 99204 OFFICE O/P NEW MOD 45 MIN: CPT | Mod: 25

## 2023-08-23 NOTE — HISTORY OF PRESENT ILLNESS
[de-identified] : 82-year-old female with a history of Parkinson's disease, never smoker, mild DWAYNE not on treatment, with history right lower lobe pneumonia, presenting for dysphagia and suspected aspiration She has a history of osteoarthritis and rheumatoid arthritis and is on immunosuppressive medication.  She had a recent chest x-ray that was unremarkable. Pt had swallow eval that showed small amount of aspiration. Pt clinically is swallowing well with no cough. Rinvoq was held and parkinsons meds adjusted to assist with swallowing and immune system. Pt was given exercises but has had no follow up. [facility] notes reviewed 3-5/23. Daughter present for appt and contributed.     5/17/23: Since I last saw her she had anotehr aspiration event/pnsuemonia for which she was admitted to [facility]. CXR today with no appreciable infiltrates. Remains on O2 intermittetnly; symbicort, albuterol PRN. Lungs clear on exam today.

## 2023-08-23 NOTE — PROCEDURE
[Dysphagia] : dysphagia not clearly evaluated by indirect laryngoscopy [Flexible Endoscope] : examined with the flexible endoscope [Normal] : posterior cricoid area had healthy pink mucosa in the interarytenoid area and the esophageal inlet

## 2023-08-28 ENCOUNTER — APPOINTMENT (OUTPATIENT)
Dept: PULMONOLOGY | Facility: CLINIC | Age: 83
End: 2023-08-28
Payer: MEDICARE

## 2023-08-28 VITALS
HEIGHT: 57 IN | OXYGEN SATURATION: 94 % | WEIGHT: 150 LBS | DIASTOLIC BLOOD PRESSURE: 70 MMHG | BODY MASS INDEX: 32.36 KG/M2 | SYSTOLIC BLOOD PRESSURE: 124 MMHG

## 2023-08-28 DIAGNOSIS — J69.0 PNEUMONITIS DUE TO INHALATION OF FOOD AND VOMIT: ICD-10-CM

## 2023-08-28 PROCEDURE — 99213 OFFICE O/P EST LOW 20 MIN: CPT

## 2023-08-28 RX ORDER — IPRATROPIUM BROMIDE AND ALBUTEROL 20; 100 UG/1; UG/1
20-100 SPRAY, METERED RESPIRATORY (INHALATION) 4 TIMES DAILY
Qty: 1 | Refills: 3 | Status: ACTIVE | COMMUNITY
Start: 2023-08-28 | End: 1900-01-01

## 2023-08-28 NOTE — ASSESSMENT
[FreeTextEntry1] : Continue with aspiration precautions  S/P ENT evaluation; barium swallow ordered  Lungs clear on exam  Continue with albuterol as needed  Switch to nebulized form 6 months follow up

## 2023-08-28 NOTE — REVIEW OF SYSTEMS
[EDS] : no EDS [Cough] : no cough [Wheezing] : no wheezing [Negative] : Endocrine [TextBox_30] : No cough or wheezing

## 2023-08-28 NOTE — REASON FOR VISIT
[Follow-Up] : a follow-up visit [Asthma] : asthma [Pneumonia] : pneumonia [Shortness of Breath] : shortness of breath [Wheezing] : wheezing

## 2023-08-28 NOTE — HISTORY OF PRESENT ILLNESS
[TextBox_4] : 82-year-old female with a history of Parkinson's disease, never smoker, mild DWAYNE not on treatment, recent admission to the hospital for right lower lobe pneumonia, presenting for evaluation of shortness of breath and wheezing that have been worse since her hospitalization.  She has a history of osteoarthritis and rheumatoid arthritis and is on immunosuppressive medication.  She notes occasional wheezing but is not currently wheezing on exam.  She is short of breath especially with exertion and her O2 saturation after walking for less than 2 minutes dropped to 88%.  She had a recent chest x-ray that was unremarkable.  5/17/23: Since I last saw her she had another aspiration event/pnsuemonia for which she was admitted to [facility]. CXR today with no appreciable infiltrates. Remains on O2 intermittently; Symbicort, albuterol PRN. Lungs clear on exam today.   8/28/23: No issues. On O2 as needed. No recent episodes of pneumonia. On modified diet. She was also seen by ENT and a barium swallow is ordered. Will switch albuterol to nebulized form due to diffculty using it. 6 months follow up.

## 2023-08-29 NOTE — DISCHARGE NOTE ADULT - NSFTFHOME1RD_GEN_ALL_CORE
August 29, 2023      [facility]  [address]  [facility] [address]  Phone: [phone]       Patient: Selena Turcios   YOB: 2004  Date of Visit: 08/29/2023    To Whom It May Concern:    Aixa Turcios  was at [facility] on 08/29/2023. The patient may return to work/school on 08/30/2023 with no restrictions. If you have any questions or concerns, or if I can be of further assistance, please do not hesitate to contact me.    Sincerely,    Monique Quinn MA      Ataxic gait

## 2023-09-05 RX ORDER — ALBUTEROL SULFATE 2.5 MG/3ML
(2.5 MG/3ML) SOLUTION RESPIRATORY (INHALATION)
Qty: 300 | Refills: 3 | Status: ACTIVE | COMMUNITY
Start: 2023-09-05 | End: 1900-01-01

## 2023-09-05 RX ORDER — ALBUTEROL SULFATE 2.5 MG/3ML
(2.5 MG/3ML) SOLUTION RESPIRATORY (INHALATION) EVERY 8 HOURS
Qty: 1 | Refills: 3 | Status: DISCONTINUED | COMMUNITY
Start: 2023-09-05 | End: 2023-09-05

## 2024-01-07 NOTE — ED ADULT TRIAGE NOTE - MEANS OF ARRIVAL
Pt tolerated PIV placement well with use of J-tip. Blood obtained and sent to lab. Flushed for patency and secured with tegaderm. IV fluids infusing without difficulty. No further needs at this time.     stretcher

## 2024-01-10 NOTE — ED ADULT NURSE NOTE - NS ED PATIENT SAFETY CONCERN
No
Argelia Burk, DO:  Feels better. No new complaints. Results are discussed. An opportunity to ask questions was provided and all questions answered. Discharge plan is discussed with the patient. Patient reports understanding to do the follow up with pcp. Return precautions discussed.

## 2024-02-20 ENCOUNTER — APPOINTMENT (OUTPATIENT)
Dept: PULMONOLOGY | Facility: CLINIC | Age: 84
End: 2024-02-20
Payer: MEDICARE

## 2024-02-20 VITALS
OXYGEN SATURATION: 92 % | HEIGHT: 59 IN | BODY MASS INDEX: 33.26 KG/M2 | DIASTOLIC BLOOD PRESSURE: 62 MMHG | WEIGHT: 165 LBS | HEART RATE: 71 BPM | SYSTOLIC BLOOD PRESSURE: 112 MMHG | RESPIRATION RATE: 14 BRPM

## 2024-02-20 DIAGNOSIS — G20.A1 PARKINSON'S DISEASE WITHOUT DYSKINESIA, WITHOUT MENTION OF FLUCTUATIONS: ICD-10-CM

## 2024-02-20 DIAGNOSIS — J45.909 UNSPECIFIED ASTHMA, UNCOMPLICATED: ICD-10-CM

## 2024-02-20 DIAGNOSIS — G47.30 SLEEP APNEA, UNSPECIFIED: ICD-10-CM

## 2024-02-20 DIAGNOSIS — R13.10 DYSPHAGIA, UNSPECIFIED: ICD-10-CM

## 2024-02-20 PROCEDURE — 99213 OFFICE O/P EST LOW 20 MIN: CPT

## 2024-02-20 NOTE — REVIEW OF SYSTEMS
[EDS] : no EDS [Cough] : no cough [Sputum] : no sputum [Dyspnea] : no dyspnea [Wheezing] : no wheezing [Negative] : Endocrine [TextBox_30] : No cough or wheezing

## 2024-02-20 NOTE — ASSESSMENT
[FreeTextEntry1] : Continue with aspiration precautions  Lungs clear on exam  Continue with albuterol nebs as needed No new issues  Continue O2 intermittently and during sleep  6 months follow up

## 2024-02-20 NOTE — HISTORY OF PRESENT ILLNESS
[TextBox_4] : 82-year-old female with a history of Parkinson's disease, never smoker, mild DWAYNE not on treatment, recent admission to the hospital for right lower lobe pneumonia, presenting for evaluation of shortness of breath and wheezing that have been worse since her hospitalization.  She has a history of osteoarthritis and rheumatoid arthritis and is on immunosuppressive medication.  She notes occasional wheezing but is not currently wheezing on exam.  She is short of breath especially with exertion and her O2 saturation after walking for less than 2 minutes dropped to 88%.  She had a recent chest x-ray that was unremarkable.  2/20/24: Stable. No wheezing. Rarely has to use Albuterol nebs. On O2 intermittently. Following with neurology.

## 2024-02-24 ENCOUNTER — NON-APPOINTMENT (OUTPATIENT)
Age: 84
End: 2024-02-24

## 2024-05-31 NOTE — ED PROVIDER NOTE - GASTROINTESTINAL, MLM
[facility]  Progress Note      Patient Name: Jose Francisco Romero  MRN: [id number]  Admission Date: 5/29/2024  Attending Physician: Emilio Ramirez MD   Primary Care Provider: Antoine Garner MD  Resident: Briana   Intern: [place]        Patient information was obtained from patient and ER records.     Subjective:     Principal Problem:Diabetic ketoacidosis without coma associated with type 1 diabetes mellitus    Chief Complaint:   Chief Complaint   Patient presents with    Abdominal Pain     Pt. C/o R sided abdominal pain that he states goes down the entire R side of his body. CBG >500 Hx DM1        HPI: Patient is a 44 year old male with PMH of Type 1 diabetes mellitus, HTN, history of drug abuse presented to the [facility] ED with complaints of nausea, generalized aches/pain and elevated blood sugar. He mentions that he did not eat today, but admitted to smoking Meth. Denies any dysphagia, GERD or nausea.   Reports he had a ground level fall onto his back two days ago. Denies hitting his head or loss of consciousness. Does not believe right sided pain resulted from fall.    Patient recently discharged from [facility] on 5.26.24 for DKA.     In the ED, patient tachycardic but afebrile.  CBC unremarkable.  CMP notable for hyponatremia, hyperkalemia, anion gap metabolic acidosis with anion gap of 26.  Elevated renal indices with BUN/creatinine at 45/2.  LFTs within normal limits.  Beta hydroxybutyrate noted at 8.30.  Lactic Acid and troponin within normal limits.  VBG remarkable for 7.40/28/38/17. Urinalysis pending. Medicine consulted for admission and management of DKA.       Interval Hx:   5/30/24: Admitted to ICU  5/31: Down-graded to floor    Patient has been downgraded to the floor. VSS. He continues to report generalized aches, which he states is a chronic issue. Denies new onset CP, SOB, confusion, urinary sxs.       Past Medical History:   Diagnosis Date    Diabetes mellitus type I     Hypertension         Past Surgical History:   Procedure Laterality Date    CLAVICLE SURGERY      ESOPHAGOGASTRODUODENOSCOPY Left 9/1/2023    Procedure: EGD (ESOPHAGOGASTRODUODENOSCOPY);  Surgeon: Daiana Chilel MD;  Location: [facility] ENDOSCOPY;  Service: Gastroenterology;  Laterality: Left;       Review of patient's allergies indicates:   Allergen Reactions    Penicillins Shortness Of Breath    Sulfa (sulfonamide antibiotics)        No current facility-administered medications on file prior to encounter.     Current Outpatient Medications on File Prior to Encounter   Medication Sig    albuterol (PROVENTIL/VENTOLIN HFA) 90 mcg/actuation inhaler Inhale into the lungs.    amLODIPine (NORVASC) 10 MG tablet Take 1 tablet (10 mg total) by mouth once daily.    blood sugar diagnostic Strp To check BG 4 times daily, to use with insurance preferred meter    blood sugar diagnostic Strp To check BG 4 times daily, to use with insurance preferred meter    blood-glucose meter kit To check BG 4 times daily, to use with insurance preferred meter    blood-glucose meter,continuous (DEXCOM G7 ) Misc Dexcom G7  uses directed    blood-glucose sensor (DEXCOM G7 SENSOR) Trinh Use every 10 days as directed    buPROPion (WELLBUTRIN XL) 300 MG 24 hr tablet Take 300 mg by mouth once daily.    clonazePAM (KLONOPIN) 2 MG Tab Take 2 mg by mouth every evening.    DULoxetine (CYMBALTA) 30 MG capsule Take 1 capsule (30 mg total) by mouth once daily.    ibuprofen (ADVIL,MOTRIN) 600 MG tablet Take 1 tablet (600 mg total) by mouth every 6 (six) hours as needed for Pain.    insulin (LANTUS SOLOSTAR U-100 INSULIN) glargine 100 units/mL SubQ pen Inject 25 Units into the skin once daily.    insulin aspart U-100 (NOVOLOG FLEXPEN U-100 INSULIN) 100 unit/mL (3 mL) InPn pen 7 units in the am, then 12 units lunch and supper; sliding scale with correction 1:50 >150 Max dose 50 units    LIDOcaine (XYLOCAINE) 5 % Oint ointment Apply 2 g topically 2 (two) times  "daily as needed.    lisinopriL 10 MG tablet Take 1 tablet (10 mg total) by mouth once daily.    mupirocin (BACTROBAN) 2 % ointment Apply topically 3 (three) times daily.    pantoprazole (PROTONIX) 40 MG tablet Take 1 tablet (40 mg total) by mouth once daily.    pen needle, diabetic 31 gauge x 3/16" Ndle Use 4 times a day for insulin injection (BD Mini)    pregabalin (LYRICA) 50 MG capsule Take 50 mg by mouth 2 (two) times daily.    traZODone (DESYREL) 100 MG tablet Take 100 mg by mouth every evening.    TRUEPLUS LANCETS 28 gauge Misc Apply topically 4 (four) times daily.     Family History       Problem Relation (Age of Onset)    Diabetes Mother, Father    Heart disease Father          Tobacco Use    Smoking status: Former     Current packs/day: 0.00     Types: Cigarettes     Quit date: 5/15/2023     Years since quittin.0     Passive exposure: Past    Smokeless tobacco: Never   Substance and Sexual Activity    Alcohol use: Never    Drug use: Never    Sexual activity: Yes     Partners: Female     Birth control/protection: Condom     Review of Systems   Constitutional:  Negative for chills, fatigue and fever.   Respiratory:  Negative for shortness of breath.    Cardiovascular:  Negative for chest pain and palpitations.   Gastrointestinal:  Negative for blood in stool, nausea and vomiting.   Endocrine: Negative for polyuria.   Genitourinary:  Negative for difficulty urinating and hematuria.   Musculoskeletal:  Positive for myalgias.   Neurological:  Negative for headaches.     Objective:     Vital Signs (Most Recent):  Temp: 97.5 °F (36.4 °C) (24 0744)  Pulse: 75 (24 0800)  Resp: 12 (24 0337)  BP: (!) 157/107 (24 0744)  SpO2: 99 % (24 0800) Vital Signs (24h Range):  Temp:  [97.4 °F (36.3 °C)-98.5 °F (36.9 °C)] 97.5 °F (36.4 °C)  Pulse:  [75-95] 75  Resp:  [11-26] 12  SpO2:  [98 %-99 %] 99 %  BP: (134-159)/() 157/107     Weight: 72.3 kg (159 lb 6.3 oz)  Body mass index is 21.03 " kg/m².    Physical Exam  Constitutional:       General: He is not in acute distress.     Appearance: He is not toxic-appearing or diaphoretic.   HENT:      Head: Atraumatic.      Mouth/Throat:      Mouth: Mucous membranes are moist.   Eyes:      Extraocular Movements: Extraocular movements intact.      Conjunctiva/sclera: Conjunctivae normal.   Cardiovascular:      Rate and Rhythm: Normal rate and regular rhythm.   Pulmonary:      Effort: Pulmonary effort is normal.      Breath sounds: Normal breath sounds. No wheezing or rhonchi.   Abdominal:      General: Bowel sounds are normal. There is no distension.      Palpations: Abdomen is soft.      Tenderness: There is no abdominal tenderness.   Musculoskeletal:         General: Normal range of motion.      Right lower leg: No edema.      Left lower leg: No edema.   Skin:     General: Skin is warm.      Comments: No observable abrasions, lacerations or lesions on exposed skin.   Neurological:      General: No focal deficit present.      Mental Status: He is alert and oriented to person, place, and time.      Comments: Speech clear and understandable.   Psychiatric:         Thought Content: Thought content normal.            Significant Labs: All pertinent labs within the past 24 hours have been reviewed.  Recent Lab Results  (Last 5 results in the past 24 hours)        05/31/24  0745   05/31/24  0413   05/30/24  2144   05/30/24  1535   05/30/24  1217        Anion Gap   7.0             Baso #   0.04             Basophil %   0.6             BUN   19.0             BUN/CREAT RATIO   18             Calcium   8.6             Chloride   100             CO2   26             Creatinine   1.04             eGFR   >60             Eos #   0.67             Eos %   10.9             Glucose   310             Hematocrit   34.0             Hemoglobin   11.7             Extra Tube   Hold for add-ons.  Comment: Auto resulted.          Hold for add-ons.  Comment: Auto resulted.                    Hold for add-ons.  Comment: Auto resulted.          Immature Grans (Abs)   0.01             Immature Granulocytes   0.2             Lymph #   2.50             LYMPH %   40.5             MCH   27.9             MCHC   34.4             MCV   81.0             Mono #   0.31             Mono %   5.0             MPV   10.6             Neut #   2.64             Neut %   42.8             nRBC   0.0             Platelet Count   229             POCT Glucose 327     193   114         Potassium   4.5             RBC   4.20             RDW   13.5             Sodium   133             WBC   6.17                                    Significant Imaging: I have reviewed all pertinent imaging results/findings within the past 24 hours.  Imaging Results              X-Ray Chest AP Portable (Final result)  Result time 05/30/24 06:04:51      Final result by Gay Hussein MD (05/30/24 06:04:51)                   Impression:      No acute cardiopulmonary abnormality.      Electronically signed by: Gay Hussein  Date:    05/30/2024  Time:    06:04               Narrative:    EXAMINATION:  XR CHEST AP PORTABLE    CLINICAL HISTORY:  Chest pain;    TECHNIQUE:  Single frontal view of the chest was performed.    COMPARISON:  05/23/2024    FINDINGS:  LINES AND TUBES: EKG/telemetry leads overlie the chest.    MEDIASTINUM AND MARGOT: The cardiac silhouette is normal.    LUNGS: No lobar consolidation. No edema.    PLEURA:No pleural effusion. No pneumothorax.    BONES: Prior left clavicle ORIF.  Old left rib and scapula deformities.  Probable low-grade cartilaginous lesion at the proximal right humerus.                        Wet Read by Cesar Brock MD (05/30/24 01:01:04, [facility], Emergency Medicine)    No acute pulmonary process                                      Assessment/Plan:     Active Diagnoses:    Diagnosis Date Noted POA    PRINCIPAL PROBLEM:  Diabetic ketoacidosis without coma associated with  type 1 diabetes mellitus [E10.10] 2023 Yes    Malnutrition [E46] 2024 Yes      Problems Resolved During this Admission:     DM Type 1  DKA, resolved  High anion gap metabolic acidosis, resolved  Acute Kidney Injury  Hyponatremia  Hyperkalemia  - On arrival: CB Urine Ketones: 3+ Bicarb: 15  Gap: 26  BHB: 9.30  pH: 7.4 (on VBG)  - Admitted to ICU for DKA on . Placed on insulin drip with gtt per protocol with q1h accuchecks while on the drip. Transitioned to SQ insulin and downgraded .   - DKA Likely 2/2 medication non compliance   - Electrolytes improved. CTM on AM labs. Keep K > 4, Mg > 2, Phos > 3  - Resume home Lantus 25u nightly.   - On home Novolog 7 units in the morning and 12 units with lunch and supper. Will resume Novolog at 7u TIDWM + SSI   - CBG checks QID with meals and nightly     Hx of HTN  - Resume home Amlodipine 10 mg qd and Lisinopril 10mg qd.   - PRN antihypertensives ordered.    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  CTM CBGs. Likely DC tomorrow      Milana Akhtar, DO  [employer]  HO-1     Abdomen soft, non-tender, no guarding.

## 2024-09-03 ENCOUNTER — APPOINTMENT (OUTPATIENT)
Dept: PULMONOLOGY | Facility: CLINIC | Age: 84
End: 2024-09-03
Payer: MEDICARE

## 2024-09-03 VITALS
BODY MASS INDEX: 34.27 KG/M2 | HEART RATE: 81 BPM | DIASTOLIC BLOOD PRESSURE: 72 MMHG | SYSTOLIC BLOOD PRESSURE: 108 MMHG | HEIGHT: 59 IN | WEIGHT: 170 LBS | OXYGEN SATURATION: 89 %

## 2024-09-03 DIAGNOSIS — G20.A1 PARKINSON'S DISEASE WITHOUT DYSKINESIA, WITHOUT MENTION OF FLUCTUATIONS: ICD-10-CM

## 2024-09-03 DIAGNOSIS — J45.909 UNSPECIFIED ASTHMA, UNCOMPLICATED: ICD-10-CM

## 2024-09-03 DIAGNOSIS — R13.10 DYSPHAGIA, UNSPECIFIED: ICD-10-CM

## 2024-09-03 PROCEDURE — G2211 COMPLEX E/M VISIT ADD ON: CPT

## 2024-09-03 PROCEDURE — 99213 OFFICE O/P EST LOW 20 MIN: CPT

## 2024-09-03 NOTE — HISTORY OF PRESENT ILLNESS
[TextBox_4] : 82-year-old female with a history of Parkinson's disease, never smoker, mild DWAYNE not on treatment, recent admission to the hospital for right lower lobe pneumonia, presenting for evaluation of shortness of breath and wheezing that have been worse since her hospitalization.  She has a history of osteoarthritis and rheumatoid arthritis and is on immunosuppressive medication.  She notes occasional wheezing but is not currently wheezing on exam.  She is short of breath especially with exertion and her O2 saturation after walking for less than 2 minutes dropped to 88%.  She had a recent chest x-ray that was unremarkable.  9/3/24: Stable. No wheezing. Rarely has to use Albuterol nebs. On O2 intermittently. Following with neurology. Back on Rinvoq for inflammatory arthritis. clear lungs on exam today.

## 2024-09-09 ENCOUNTER — NON-APPOINTMENT (OUTPATIENT)
Age: 84
End: 2024-09-09

## 2024-09-10 ENCOUNTER — EMERGENCY (EMERGENCY)
Facility: HOSPITAL | Age: 84
LOS: 0 days | Discharge: HOME | End: 2024-09-10
Attending: EMERGENCY MEDICINE
Payer: MEDICARE

## 2024-09-10 VITALS
RESPIRATION RATE: 16 BRPM | HEART RATE: 78 BPM | TEMPERATURE: 98 F | DIASTOLIC BLOOD PRESSURE: 65 MMHG | OXYGEN SATURATION: 98 % | HEIGHT: 59 IN | WEIGHT: 164.91 LBS | SYSTOLIC BLOOD PRESSURE: 102 MMHG

## 2024-09-10 DIAGNOSIS — Z86.711 PERSONAL HISTORY OF PULMONARY EMBOLISM: ICD-10-CM

## 2024-09-10 DIAGNOSIS — L03.031 CELLULITIS OF RIGHT TOE: ICD-10-CM

## 2024-09-10 DIAGNOSIS — M06.9 RHEUMATOID ARTHRITIS, UNSPECIFIED: ICD-10-CM

## 2024-09-10 DIAGNOSIS — E03.9 HYPOTHYROIDISM, UNSPECIFIED: ICD-10-CM

## 2024-09-10 DIAGNOSIS — Z88.6 ALLERGY STATUS TO ANALGESIC AGENT: ICD-10-CM

## 2024-09-10 DIAGNOSIS — Z88.8 ALLERGY STATUS TO OTHER DRUGS, MEDICAMENTS AND BIOLOGICAL SUBSTANCES: ICD-10-CM

## 2024-09-10 DIAGNOSIS — Z98.84 BARIATRIC SURGERY STATUS: Chronic | ICD-10-CM

## 2024-09-10 DIAGNOSIS — G20.A1 PARKINSON'S DISEASE WITHOUT DYSKINESIA, WITHOUT MENTION OF FLUCTUATIONS: ICD-10-CM

## 2024-09-10 DIAGNOSIS — Z91.048 OTHER NONMEDICINAL SUBSTANCE ALLERGY STATUS: ICD-10-CM

## 2024-09-10 DIAGNOSIS — L40.50 ARTHROPATHIC PSORIASIS, UNSPECIFIED: ICD-10-CM

## 2024-09-10 DIAGNOSIS — M21.969 UNSPECIFIED ACQUIRED DEFORMITY OF UNSPECIFIED LOWER LEG: Chronic | ICD-10-CM

## 2024-09-10 LAB
ALBUMIN SERPL ELPH-MCNC: 4.4 G/DL — SIGNIFICANT CHANGE UP (ref 3.5–5.2)
ALP SERPL-CCNC: 75 U/L — SIGNIFICANT CHANGE UP (ref 30–115)
ALT FLD-CCNC: <5 U/L — SIGNIFICANT CHANGE UP (ref 0–41)
ANION GAP SERPL CALC-SCNC: 11 MMOL/L — SIGNIFICANT CHANGE UP (ref 7–14)
AST SERPL-CCNC: 38 U/L — SIGNIFICANT CHANGE UP (ref 0–41)
BASOPHILS # BLD AUTO: 0.03 K/UL — SIGNIFICANT CHANGE UP (ref 0–0.2)
BASOPHILS NFR BLD AUTO: 0.2 % — SIGNIFICANT CHANGE UP (ref 0–1)
BILIRUB SERPL-MCNC: 0.6 MG/DL — SIGNIFICANT CHANGE UP (ref 0.2–1.2)
BUN SERPL-MCNC: 35 MG/DL — HIGH (ref 10–20)
CALCIUM SERPL-MCNC: 8.4 MG/DL — SIGNIFICANT CHANGE UP (ref 8.4–10.5)
CHLORIDE SERPL-SCNC: 101 MMOL/L — SIGNIFICANT CHANGE UP (ref 98–110)
CO2 SERPL-SCNC: 29 MMOL/L — SIGNIFICANT CHANGE UP (ref 17–32)
CREAT SERPL-MCNC: 1.2 MG/DL — SIGNIFICANT CHANGE UP (ref 0.7–1.5)
EGFR: 45 ML/MIN/1.73M2 — LOW
EOSINOPHIL # BLD AUTO: 0.06 K/UL — SIGNIFICANT CHANGE UP (ref 0–0.7)
EOSINOPHIL NFR BLD AUTO: 0.4 % — SIGNIFICANT CHANGE UP (ref 0–8)
GLUCOSE SERPL-MCNC: 105 MG/DL — HIGH (ref 70–99)
HCT VFR BLD CALC: 37.6 % — SIGNIFICANT CHANGE UP (ref 37–47)
HGB BLD-MCNC: 12 G/DL — SIGNIFICANT CHANGE UP (ref 12–16)
IMM GRANULOCYTES NFR BLD AUTO: 0.4 % — HIGH (ref 0.1–0.3)
LACTATE SERPL-SCNC: 0.8 MMOL/L — SIGNIFICANT CHANGE UP (ref 0.7–2)
LYMPHOCYTES # BLD AUTO: 1.34 K/UL — SIGNIFICANT CHANGE UP (ref 1.2–3.4)
LYMPHOCYTES # BLD AUTO: 9.8 % — LOW (ref 20.5–51.1)
MCHC RBC-ENTMCNC: 31.9 G/DL — LOW (ref 32–37)
MCHC RBC-ENTMCNC: 31.9 PG — HIGH (ref 27–31)
MCV RBC AUTO: 100 FL — HIGH (ref 81–99)
MONOCYTES # BLD AUTO: 0.54 K/UL — SIGNIFICANT CHANGE UP (ref 0.1–0.6)
MONOCYTES NFR BLD AUTO: 4 % — SIGNIFICANT CHANGE UP (ref 1.7–9.3)
NEUTROPHILS # BLD AUTO: 11.64 K/UL — HIGH (ref 1.4–6.5)
NEUTROPHILS NFR BLD AUTO: 85.2 % — HIGH (ref 42.2–75.2)
NRBC # BLD: 0 /100 WBCS — SIGNIFICANT CHANGE UP (ref 0–0)
PLATELET # BLD AUTO: 226 K/UL — SIGNIFICANT CHANGE UP (ref 130–400)
PMV BLD: 10.1 FL — SIGNIFICANT CHANGE UP (ref 7.4–10.4)
POTASSIUM SERPL-MCNC: 4.8 MMOL/L — SIGNIFICANT CHANGE UP (ref 3.5–5)
POTASSIUM SERPL-SCNC: 4.8 MMOL/L — SIGNIFICANT CHANGE UP (ref 3.5–5)
PROT SERPL-MCNC: 7.1 G/DL — SIGNIFICANT CHANGE UP (ref 6–8)
RBC # BLD: 3.76 M/UL — LOW (ref 4.2–5.4)
RBC # FLD: 13.5 % — SIGNIFICANT CHANGE UP (ref 11.5–14.5)
SODIUM SERPL-SCNC: 141 MMOL/L — SIGNIFICANT CHANGE UP (ref 135–146)
WBC # BLD: 13.67 K/UL — HIGH (ref 4.8–10.8)
WBC # FLD AUTO: 13.67 K/UL — HIGH (ref 4.8–10.8)

## 2024-09-10 PROCEDURE — 99284 EMERGENCY DEPT VISIT MOD MDM: CPT | Mod: 25

## 2024-09-10 PROCEDURE — 85025 COMPLETE CBC W/AUTO DIFF WBC: CPT

## 2024-09-10 PROCEDURE — 36415 COLL VENOUS BLD VENIPUNCTURE: CPT

## 2024-09-10 PROCEDURE — 96375 TX/PRO/DX INJ NEW DRUG ADDON: CPT

## 2024-09-10 PROCEDURE — 73630 X-RAY EXAM OF FOOT: CPT | Mod: 26,RT

## 2024-09-10 PROCEDURE — 99285 EMERGENCY DEPT VISIT HI MDM: CPT

## 2024-09-10 PROCEDURE — 96374 THER/PROPH/DIAG INJ IV PUSH: CPT

## 2024-09-10 PROCEDURE — 73630 X-RAY EXAM OF FOOT: CPT | Mod: RT

## 2024-09-10 PROCEDURE — 80053 COMPREHEN METABOLIC PANEL: CPT

## 2024-09-10 PROCEDURE — 83605 ASSAY OF LACTIC ACID: CPT

## 2024-09-10 RX ORDER — METRONIDAZOLE 250 MG
500 TABLET ORAL ONCE
Refills: 0 | Status: COMPLETED | OUTPATIENT
Start: 2024-09-10 | End: 2024-09-10

## 2024-09-10 RX ORDER — CEPHALEXIN 500 MG
1 CAPSULE ORAL
Qty: 28 | Refills: 0
Start: 2024-09-10 | End: 2024-09-16

## 2024-09-10 RX ORDER — CEFEPIME 2 G/1
2000 INJECTION, POWDER, FOR SOLUTION INTRAVENOUS ONCE
Refills: 0 | Status: COMPLETED | OUTPATIENT
Start: 2024-09-10 | End: 2024-09-10

## 2024-09-10 RX ORDER — SODIUM CHLORIDE 9 MG/ML
500 INJECTION INTRAMUSCULAR; INTRAVENOUS; SUBCUTANEOUS ONCE
Refills: 0 | Status: COMPLETED | OUTPATIENT
Start: 2024-09-10 | End: 2024-09-10

## 2024-09-10 RX ORDER — VANCOMYCIN/0.9 % SOD CHLORIDE 1.75G/25
1000 PLASTIC BAG, INJECTION (ML) INTRAVENOUS ONCE
Refills: 0 | Status: DISCONTINUED | OUTPATIENT
Start: 2024-09-10 | End: 2024-09-10

## 2024-09-10 RX ADMIN — Medication 100 MILLIGRAM(S): at 19:43

## 2024-09-10 RX ADMIN — CEFEPIME 100 MILLIGRAM(S): 2 INJECTION, POWDER, FOR SOLUTION INTRAVENOUS at 19:42

## 2024-09-10 RX ADMIN — SODIUM CHLORIDE 1000 MILLILITER(S): 9 INJECTION INTRAMUSCULAR; INTRAVENOUS; SUBCUTANEOUS at 18:41

## 2024-09-10 NOTE — ED PROVIDER NOTE - NSFOLLOWUPCLINICS_GEN_ALL_ED_FT
[facility]  Podiatry  .  [place]   Phone: [phone]  Fax:   Follow Up Time: Routine

## 2024-09-10 NOTE — ED PROVIDER NOTE - CLINICAL SUMMARY MEDICAL DECISION MAKING FREE TEXT BOX
84-year-old woman, history of Parkinson's disease, PE no longer on anticoagulation, psoriatic and rheumatoid arthritis on immunomodulation, hypothyroid was brought in by daughter due to infected RIGHT second toe.  Of note, patient had a corn removed from the PIP of the second toe by a new podiatrist about 3 weeks ago.  Today when daughter visited, she noticed that the bandage had not been changed.  She removed the bandage and noted the toe was erythematous and swollen.  She was concerned for infection so insisted that patient come to the ED.  Patient is otherwise feeling very well, is nontoxic-appearing and pleasantly cooperative.  The toe is erythematous but not particularly tender.  There is no crepitus.  The wound is dry.  There is no lymphangitic streaking of the foot, though there is erythema at the base of the toe.  Labs with mild leukocytosis of 13.7.  BUN and creatinine are 35/1.2, which are similar to prior but also could be borderline NOEMÍ.  X-ray is negative for fracture or gas.  Considered admission, however patient feels very well, looks very well, and has good family support.  She prefers to go home with p.o. antibiotics and close follow-up.  I spoke with her PMD Dr. Jennifer Moncada, who will follow her in the office within the next few days, repeat labs, and reassess the wound.  Close return precautions given, and patient and her daughter are comfortable with discharge.

## 2024-09-10 NOTE — ED PROVIDER NOTE - NSFOLLOWUPINSTRUCTIONS_ED_ALL_ED_FT
Our Emergency Department Referral Coordinators will be reaching out to you in the next 24-48 hours from 9:00am to 5:00pm (Monday to Friday) with a follow up appointment. Please expect a phone call from the hospital in that time frame. If you do not receive a call or if you have any questions or concerns, you can reach them at [phone]      .Peripheral Neuropathy  Peripheral neuropathy is a type of nerve damage. It affects nerves that carry signals between the spinal cord and the arms, legs, and the rest of the body (peripheral nerves). It does not affect nerves in the spinal cord or brain. In peripheral neuropathy, one nerve or a group of nerves may be damaged. Peripheral neuropathy is a broad category that includes many specific nerve disorders, like diabetic neuropathy, hereditary neuropathy, and carpal tunnel syndrome.    What are the causes?  This condition may be caused by:  Certain diseases, such as:  Diabetes. This is the most common cause of peripheral neuropathy.  Autoimmune diseases, such as rheumatoid arthritis and systemic lupus erythematosus.  Nerve diseases that are passed from parent to child (inherited).  Kidney disease.  Thyroid disease.  Other causes may include:  Nerve injury.  Pressure or stress on a nerve that lasts a long time.  Lack (deficiency) of B vitamins. This can result from alcoholism, poor diet, or a restricted diet.  Infections.  Some medicines, such as cancer medicines (chemotherapy).  Poisonous (toxic) substances, such as lead and mercury.  Too little blood flowing to the legs.  In some cases, the cause of this condition is not known.    What are the signs or symptoms?  Symptoms of this condition depend on which of your nerves is damaged.  Symptoms in the legs, hands, and arms can include:  Loss of feeling (numbness) in the feet, hands, or both.  Tingling in the feet, hands, or both.  Burning pain.  Very sensitive skin.  Weakness.  Not being able to move a part of the body (paralysis).  Clumsiness or poor coordination.  Muscle twitching.  Loss of balance.  Symptoms in other parts of the body can include:  Not being able to control your bladder.  Feeling dizzy.  Sexual problems.  How is this diagnosed?  Diagnosing and finding the cause of peripheral neuropathy can be difficult. Your health care provider will take your medical history and do a physical exam. A neurological exam will also be done. This involves checking things that are affected by your brain, spinal cord, and nerves (nervous system). For example, your health care provider will check your reflexes, how you move, and what you can feel.    You may have other tests, such as:  Blood tests.  Electromyogram (EMG) and nerve conduction tests. These tests check nerve function and how well the nerves are controlling the muscles.  Imaging tests, such as a CT scan or MRI, to rule out other causes of your symptoms.  Removing a small piece of nerve to be examined in a lab (nerve biopsy).  Removing and examining a small amount of the fluid that surrounds the brain and spinal cord (lumbar puncture).  How is this treated?  Treatment for this condition may involve:  Treating the underlying cause of the neuropathy, such as diabetes, kidney disease, or vitamin deficiencies.  Stopping medicines that can cause neuropathy, such as chemotherapy.  Medicine to help relieve pain. Medicines may include:  Prescription or over-the-counter pain medicine.  Anti-seizure medicine.  Antidepressants.  Pain-relieving patches that are applied to painful areas of skin.  Surgery to relieve pressure on a nerve or to destroy a nerve that is causing pain.  Physical therapy to help improve movement and balance.  Devices to help you move around (assistive devices).  Follow these instructions at home:  Medicines    Take over-the-counter and prescription medicines only as told by your health care provider. Do not take any other medicines without first asking your health care provider.  Ask your health care provider if the medicine prescribed to you requires you to avoid driving or using machinery.  Lifestyle    A plate along with examples of foods in a healthy diet.  Do not use any products that contain nicotine or tobacco. These products include cigarettes, chewing tobacco, and vaping devices, such as e-cigarettes. Smoking keeps blood from reaching damaged nerves. If you need help quitting, ask your health care provider.  Avoid or limit alcohol. Too much alcohol can cause a vitamin B deficiency, and vitamin B is needed for healthy nerves.  Eat a healthy diet. This includes:  Eating foods that are high in fiber, such as beans, whole grains, and fresh fruits and vegetables.  Limiting foods that are high in fat and processed sugars, such as fried or sweet foods.  General instructions    Barefoot person sitting with right leg crossed over left, using handheld mirror to see bottom of right foot.  If you have diabetes, work closely with your health care provider to keep your blood sugar under control.  If you have numbness in your feet:  Check every day for signs of injury or infection. Watch for redness, warmth, and swelling.  Wear padded socks and comfortable shoes. These help protect your feet.  Develop a good support system. Living with peripheral neuropathy can be stressful. Consider talking with a mental health specialist or joining a support group.  Use assistive devices and attend physical therapy as told by your health care provider. This may include using a walker or a cane.  Keep all follow-up visits. This is important.  Where to find more information  [place] of Neurological Disorders: www.ninds.nih.gov  Contact a health care provider if:  You have new signs or symptoms of peripheral neuropathy.  You are struggling emotionally from dealing with peripheral neuropathy.  Your pain is not well controlled.  Get help right away if:  You have an injury or infection that is not healing normally.  You develop new weakness in an arm or leg.  You have fallen or do so frequently.  Summary  Peripheral neuropathy is when the nerves in the arms or legs are damaged, resulting in numbness, weakness, or pain.  There are many causes of peripheral neuropathy, including diabetes, pinched nerves, vitamin deficiencies, autoimmune disease, and hereditary conditions.  Diagnosing and finding the cause of peripheral neuropathy can be difficult. Your health care provider will take your medical history, do a physical exam, and do tests, including blood tests and nerve function tests.  Treatment involves treating the underlying cause of the neuropathy and taking medicines to help control pain. Physical therapy and assistive devices may also help.  This information is not intended to replace advice given to you by your health care provider. Make sure you discuss any questions you have with your health care provider.    Document Revised: 08/23/2022 Document Reviewed: 08/23/2022

## 2024-09-10 NOTE — ED PROVIDER NOTE - PATIENT PORTAL LINK FT
You can access the FollowMyHealth Patient Portal offered by [facility] by registering at the following website: [email or web address]. By joining [facility] FollowMyHealth portal, you will also be able to view your health information using other applications (apps) compatible with our system.

## 2024-09-10 NOTE — ED PROVIDER NOTE - PHYSICAL EXAMINATION
CONSTITUTIONAL: NAD  SKIN: Warm, dry  HEAD: NCAT  EYES: Clear conjunctiva   ENT: MMM  NECK: Supple  CARD: RRR, S1, S2; no M/R/G  RESP: Normal respiratory effort, CTAB  EXT: Pulses palpable distally, right second toe erythematous, edematous, with discoloration and dry open wound to base of the right toe, mild tenderness at metatarsal joint.   NEURO: Grossly intact. Awake, alert, moving all extremities, no facial asymmetry.

## 2024-09-10 NOTE — ED ADULT NURSE NOTE - PAIN: NONVERBAL INDICATORS
amphetamine-dextroamphetamine (ADDERALL XR) 20 MG extended release capsule     Refill to [facility] anxious/guarding/inability to perform BADLs

## 2024-09-10 NOTE — ED PROVIDER NOTE - NSICDXPASTMEDICALHX_GEN_ALL_CORE_FT
PAST MEDICAL HISTORY:  Fibromyalgia     GERD (gastroesophageal reflux disease)     Hypothyroidism no recent dosage changes    Obesity s/p gastric bypass ~15 yrs ago ~100 lb loss    DWAYNE on CPAP     Osteoarthritis     Psoriatic arthritis     Pulmonary embolism     Rheumatoid arthritis     Seasonal allergies     Tremors of nervous system essential tremors     PAST MEDICAL HISTORY:  Fibromyalgia     GERD (gastroesophageal reflux disease)     History of Parkinson's disease     Hypothyroidism no recent dosage changes    Obesity s/p gastric bypass ~15 yrs ago ~100 lb loss    DWAYNE on CPAP     Osteoarthritis     Psoriatic arthritis     Pulmonary embolism     Rheumatoid arthritis     Seasonal allergies     Tremors of nervous system essential tremors

## 2024-09-10 NOTE — ED PROVIDER NOTE - OBJECTIVE STATEMENT
84yoF PMHx prior PE (not on AC), hypothyroidism, peripheral neuropathy brought by daughter for evaluation of wound to left second toe. Pt had a corn removed by a new podiatrist (Dr. Alfredo) 3 weeks ago, and today daughter noticed she hadn't changed the bandage since then. 84yoF PMHx parkinson's prior provoked PE (not on AC), psoriatic and rheumatic arthritis (on immunomodulation therapy), hypothyroidism, recurrent aspiration pneumonia, non-smoker, brought by daughter for evaluation of wound to left second toe. Pt had a corn removed by a new podiatrist (Dr. Alfredo) 3 weeks ago, and today daughter noticed she hadn't changed the bandage since then. Denies fever, chills, nausea, vomiting, sob

## 2024-09-10 NOTE — ED ADULT NURSE NOTE - NSICDXPASTMEDICALHX_GEN_ALL_CORE_FT
PAST MEDICAL HISTORY:  Fibromyalgia     GERD (gastroesophageal reflux disease)     History of Parkinson's disease     Hypothyroidism no recent dosage changes    Obesity s/p gastric bypass ~15 yrs ago ~100 lb loss    DWAYNE on CPAP     Osteoarthritis     Psoriatic arthritis     Pulmonary embolism     Rheumatoid arthritis     Seasonal allergies     Tremors of nervous system essential tremors

## 2024-09-27 NOTE — PROGRESS NOTE ADULT - PROBLEM SELECTOR PROBLEM 1
P/c to pt. He will upload picture of his surgical site so we can instruct properly as its not quite clear as to what he is describing   Sepsis due to Escherichia coli

## 2025-01-05 ENCOUNTER — NON-APPOINTMENT (OUTPATIENT)
Age: 85
End: 2025-01-05

## 2025-01-05 ENCOUNTER — INPATIENT (INPATIENT)
Facility: HOSPITAL | Age: 85
LOS: 1 days | Discharge: HOME HEALTH | DRG: 642 | End: 2025-01-07
Attending: INTERNAL MEDICINE | Admitting: STUDENT IN AN ORGANIZED HEALTH CARE EDUCATION/TRAINING PROGRAM
Payer: MEDICARE

## 2025-01-05 VITALS
HEIGHT: 59 IN | DIASTOLIC BLOOD PRESSURE: 80 MMHG | OXYGEN SATURATION: 98 % | SYSTOLIC BLOOD PRESSURE: 131 MMHG | WEIGHT: 143.08 LBS | RESPIRATION RATE: 18 BRPM | HEART RATE: 75 BPM | TEMPERATURE: 98 F

## 2025-01-05 DIAGNOSIS — M21.969 UNSPECIFIED ACQUIRED DEFORMITY OF UNSPECIFIED LOWER LEG: Chronic | ICD-10-CM

## 2025-01-05 DIAGNOSIS — Z98.84 BARIATRIC SURGERY STATUS: Chronic | ICD-10-CM

## 2025-01-05 DIAGNOSIS — E88.9 METABOLIC DISORDER, UNSPECIFIED: ICD-10-CM

## 2025-01-05 DIAGNOSIS — J18.9 PNEUMONIA, UNSPECIFIED ORGANISM: ICD-10-CM

## 2025-01-05 LAB
ALBUMIN SERPL ELPH-MCNC: 4.2 G/DL — SIGNIFICANT CHANGE UP (ref 3.5–5.2)
ALP SERPL-CCNC: 93 U/L — SIGNIFICANT CHANGE UP (ref 30–115)
ALT FLD-CCNC: 6 U/L — SIGNIFICANT CHANGE UP (ref 0–41)
ANION GAP SERPL CALC-SCNC: 13 MMOL/L — SIGNIFICANT CHANGE UP (ref 7–14)
APTT BLD: 33.2 SEC — SIGNIFICANT CHANGE UP (ref 27–39.2)
AST SERPL-CCNC: 45 U/L — HIGH (ref 0–41)
BASOPHILS # BLD AUTO: 0.04 K/UL — SIGNIFICANT CHANGE UP (ref 0–0.2)
BASOPHILS NFR BLD AUTO: 0.3 % — SIGNIFICANT CHANGE UP (ref 0–1)
BILIRUB SERPL-MCNC: 0.5 MG/DL — SIGNIFICANT CHANGE UP (ref 0.2–1.2)
BUN SERPL-MCNC: 37 MG/DL — HIGH (ref 10–20)
CALCIUM SERPL-MCNC: 8.7 MG/DL — SIGNIFICANT CHANGE UP (ref 8.4–10.4)
CHLORIDE SERPL-SCNC: 101 MMOL/L — SIGNIFICANT CHANGE UP (ref 98–110)
CO2 SERPL-SCNC: 25 MMOL/L — SIGNIFICANT CHANGE UP (ref 17–32)
CREAT SERPL-MCNC: 1.1 MG/DL — SIGNIFICANT CHANGE UP (ref 0.7–1.5)
EGFR: 50 ML/MIN/1.73M2 — LOW
EOSINOPHIL # BLD AUTO: 0.21 K/UL — SIGNIFICANT CHANGE UP (ref 0–0.7)
EOSINOPHIL NFR BLD AUTO: 1.7 % — SIGNIFICANT CHANGE UP (ref 0–8)
ETHANOL SERPL-MCNC: <10 MG/DL — SIGNIFICANT CHANGE UP
GLUCOSE SERPL-MCNC: 90 MG/DL — SIGNIFICANT CHANGE UP (ref 70–99)
HCT VFR BLD CALC: 43.1 % — SIGNIFICANT CHANGE UP (ref 37–47)
HGB BLD-MCNC: 13.6 G/DL — SIGNIFICANT CHANGE UP (ref 12–16)
IMM GRANULOCYTES NFR BLD AUTO: 0.3 % — SIGNIFICANT CHANGE UP (ref 0.1–0.3)
INR BLD: 1.1 RATIO — SIGNIFICANT CHANGE UP (ref 0.65–1.3)
LACTATE SERPL-SCNC: 0.5 MMOL/L — LOW (ref 0.7–2)
LIDOCAIN IGE QN: 18 U/L — SIGNIFICANT CHANGE UP (ref 7–60)
LYMPHOCYTES # BLD AUTO: 18.5 % — LOW (ref 20.5–51.1)
LYMPHOCYTES # BLD AUTO: 2.23 K/UL — SIGNIFICANT CHANGE UP (ref 1.2–3.4)
MCHC RBC-ENTMCNC: 31.2 PG — HIGH (ref 27–31)
MCHC RBC-ENTMCNC: 31.6 G/DL — LOW (ref 32–37)
MCV RBC AUTO: 98.9 FL — SIGNIFICANT CHANGE UP (ref 81–99)
MONOCYTES # BLD AUTO: 0.77 K/UL — HIGH (ref 0.1–0.6)
MONOCYTES NFR BLD AUTO: 6.4 % — SIGNIFICANT CHANGE UP (ref 1.7–9.3)
NEUTROPHILS # BLD AUTO: 8.74 K/UL — HIGH (ref 1.4–6.5)
NEUTROPHILS NFR BLD AUTO: 72.8 % — SIGNIFICANT CHANGE UP (ref 42.2–75.2)
NRBC # BLD: 0 /100 WBCS — SIGNIFICANT CHANGE UP (ref 0–0)
PLATELET # BLD AUTO: 239 K/UL — SIGNIFICANT CHANGE UP (ref 130–400)
PMV BLD: 10.6 FL — HIGH (ref 7.4–10.4)
POTASSIUM SERPL-MCNC: 5.9 MMOL/L — HIGH (ref 3.5–5)
POTASSIUM SERPL-SCNC: 5.9 MMOL/L — HIGH (ref 3.5–5)
PROT SERPL-MCNC: 7.6 G/DL — SIGNIFICANT CHANGE UP (ref 6–8)
PROTHROM AB SERPL-ACNC: 13 SEC — HIGH (ref 9.95–12.87)
RBC # BLD: 4.36 M/UL — SIGNIFICANT CHANGE UP (ref 4.2–5.4)
RBC # FLD: 13.5 % — SIGNIFICANT CHANGE UP (ref 11.5–14.5)
SODIUM SERPL-SCNC: 139 MMOL/L — SIGNIFICANT CHANGE UP (ref 135–146)
WBC # BLD: 12.03 K/UL — HIGH (ref 4.8–10.8)
WBC # FLD AUTO: 12.03 K/UL — HIGH (ref 4.8–10.8)

## 2025-01-05 PROCEDURE — 85025 COMPLETE CBC W/AUTO DIFF WBC: CPT

## 2025-01-05 PROCEDURE — 99285 EMERGENCY DEPT VISIT HI MDM: CPT

## 2025-01-05 PROCEDURE — 92611 MOTION FLUOROSCOPY/SWALLOW: CPT | Mod: GN

## 2025-01-05 PROCEDURE — 85027 COMPLETE CBC AUTOMATED: CPT

## 2025-01-05 PROCEDURE — 83735 ASSAY OF MAGNESIUM: CPT

## 2025-01-05 PROCEDURE — 71260 CT THORAX DX C+: CPT | Mod: 26,MC

## 2025-01-05 PROCEDURE — 72125 CT NECK SPINE W/O DYE: CPT | Mod: 26,MC

## 2025-01-05 PROCEDURE — 74177 CT ABD & PELVIS W/CONTRAST: CPT | Mod: 26,MC

## 2025-01-05 PROCEDURE — 84443 ASSAY THYROID STIM HORMONE: CPT

## 2025-01-05 PROCEDURE — 97162 PT EVAL MOD COMPLEX 30 MIN: CPT | Mod: GP

## 2025-01-05 PROCEDURE — 80053 COMPREHEN METABOLIC PANEL: CPT

## 2025-01-05 PROCEDURE — 70450 CT HEAD/BRAIN W/O DYE: CPT | Mod: 26,MC

## 2025-01-05 PROCEDURE — 87086 URINE CULTURE/COLONY COUNT: CPT

## 2025-01-05 PROCEDURE — 87040 BLOOD CULTURE FOR BACTERIA: CPT

## 2025-01-05 PROCEDURE — 36415 COLL VENOUS BLD VENIPUNCTURE: CPT

## 2025-01-05 PROCEDURE — 92610 EVALUATE SWALLOWING FUNCTION: CPT | Mod: GN

## 2025-01-05 PROCEDURE — 87077 CULTURE AEROBIC IDENTIFY: CPT

## 2025-01-05 PROCEDURE — 80048 BASIC METABOLIC PNL TOTAL CA: CPT

## 2025-01-05 PROCEDURE — 82607 VITAMIN B-12: CPT

## 2025-01-05 PROCEDURE — 71045 X-RAY EXAM CHEST 1 VIEW: CPT | Mod: 26

## 2025-01-05 PROCEDURE — 99223 1ST HOSP IP/OBS HIGH 75: CPT

## 2025-01-05 PROCEDURE — 87150 DNA/RNA AMPLIFIED PROBE: CPT

## 2025-01-05 PROCEDURE — 82746 ASSAY OF FOLIC ACID SERUM: CPT

## 2025-01-05 PROCEDURE — 74230 X-RAY XM SWLNG FUNCJ C+: CPT

## 2025-01-05 PROCEDURE — 81001 URINALYSIS AUTO W/SCOPE: CPT

## 2025-01-05 PROCEDURE — 72170 X-RAY EXAM OF PELVIS: CPT | Mod: 26

## 2025-01-05 RX ORDER — SODIUM ZIRCONIUM CYCLOSILICATE 10 G/10G
10 POWDER, FOR SUSPENSION ORAL ONCE
Refills: 0 | Status: COMPLETED | OUTPATIENT
Start: 2025-01-05 | End: 2025-01-05

## 2025-01-05 RX ORDER — AZITHROMYCIN MONOHYDRATE 200 MG/5ML
500 POWDER, FOR SUSPENSION ORAL ONCE
Refills: 0 | Status: COMPLETED | OUTPATIENT
Start: 2025-01-05 | End: 2025-01-05

## 2025-01-05 RX ORDER — CEFTRIAXONE SODIUM 1 G/1
1000 INJECTION, POWDER, FOR SOLUTION INTRAMUSCULAR; INTRAVENOUS ONCE
Refills: 0 | Status: COMPLETED | OUTPATIENT
Start: 2025-01-05 | End: 2025-01-05

## 2025-01-05 RX ADMIN — CEFTRIAXONE SODIUM 100 MILLIGRAM(S): 1 INJECTION, POWDER, FOR SOLUTION INTRAMUSCULAR; INTRAVENOUS at 21:22

## 2025-01-05 RX ADMIN — AZITHROMYCIN MONOHYDRATE 255 MILLIGRAM(S): 200 POWDER, FOR SUSPENSION ORAL at 21:22

## 2025-01-05 NOTE — H&P ADULT - HISTORY OF PRESENT ILLNESS
84-year-old female with past medical history of Parkinson's, obesity, GERD, hypothyroidism, history of PE not on anticoagulation, who presents for mechanical ground-level fall witnessed on home security camera.  Patient was ambulating with a walker and fell backwards.  Without any loss of consciousness.  Was helped up afterwards.  Complains of pain to the back otherwise denies any chest pain, shortness of breath, headache, lightheadedness, numbness, tingling, weakness, incontinence, urinary retention, saddle anesthesia.    IMAGING:  - CTH 1/5/25: No evidence of acute intracranial pathology.  - CT Cervical Spine No Cont 1/5/25: No acute fracture or subluxation of the cervical spine.  - CT Chest w/ IV cont 1/5/25: Acute mildly displaced fractures left posterior 10th and 11th ribs. Hazy bilateral groundglass/atelectatic pulmonary changes.   - CTAP 1/5/25: No CT evidence for acute intra-abdominal or pelvic pathology.  - XRAY Pelvis AP 1/5/25: No fractures seen.    LABS:  - WBC 12.03  - K 5.9    #Rib fracture, secondary to mechanical fall  #Possible PNA    #Hyperkalemia  - lokelma  - trend BPM          # Parkinson's disease   - c/w sinemet 50/200mg ER BID     # Fibromyalgia   - c/w Savella 25mg BID    #Hypothyroidism   - c/w synthroid 88mcg   - f/u AM TSH    #DWAYNE   - on CPAP    #RA  - hold Rinvoq 15mg till infection ruled out       84-year-old female with past medical history of Parkinson's, fibromyalgia, arthritis, obesity, GERD, hypothyroidism, history of PE not on anticoagulation, who presents for mechanical ground-level fall witnessed on home security camera.  Patient was ambulating without her walker and fell backwards.  Pt fell on her buttock and then rolled back and hit her head against the door. No LOC, dizziness, syncope.  Was helped up afterwards.  Complains of pain to the back otherwise denies any chest pain, shortness of breath, headache, lightheadedness, numbness, tingling, weakness, incontinence, urinary retention, saddle anesthesia. Also denies any URI symptoms, including fever, cough. Pt said he has had multiple episodes of aspiration PNA with 2-3 admissions over the past year.     IMAGING:  - CTH 1/5/25: No evidence of acute intracranial pathology.  - CT Cervical Spine No Cont 1/5/25: No acute fracture or subluxation of the cervical spine.  - CT Chest w/ IV cont 1/5/25: Acute mildly displaced fractures left posterior 10th and 11th ribs. Hazy bilateral groundglass/atelectatic pulmonary changes.   - CTAP 1/5/25: No CT evidence for acute intra-abdominal or pelvic pathology.  - XRAY Pelvis AP 1/5/25: No fractures seen.    LABS:  - WBC 12.03  - K 5.9    VITALS:   T(C): 36.1 (01-05-25 @ 20:55), Max: 36.6 (01-05-25 @ 16:35)  HR: 86 (01-05-25 @ 20:55) (75 - 86)  BP: 133/81 (01-05-25 @ 20:55) (131/80 - 133/81)  RR: 18 (01-05-25 @ 20:55) (18 - 18)  SpO2: 98% (01-05-25 @ 20:55) (98% - 98%)

## 2025-01-05 NOTE — ED PROVIDER NOTE - PHYSICAL EXAMINATION
Initial vital signs reviewed.    Airway: patent  Breathing: clear breath sounds bilaterally.  Circulatory: 2+ and equal distal pulses x4.    General: NAD, nontoxic appearing.  HENT: AT/NC. No cleary signs, raccoon eyes, or skull deformity. TM clear b/l without hemotympanum. Oropharynx clear without evidence of trauma.  Eyes: non-injected conjunctivae b/l. PERRLA b/l. EOMI b/l.  Neck: supple. No midline cervical stepoff or tenderness.  CV: RRR, no murmurs.  Pulm: nonlabored work of breathing, CTAB. No chest wall tenderness to palpation or crepitus.  Abd: soft, nondistended, nontender.  MSK: Paraspinal tenderness to the left thoracic. no joint deformity. No pelvis instability. No tenderness to palpation in extremities x4. No T/L/S midline spinal tenderness or stepoffs.  Skin: warm, dry, well-perfused. No obvious lacerations or active bleeding.  Neuro: A&Ox4.  Psych: appropriate mood and affect.

## 2025-01-05 NOTE — ED PROVIDER NOTE - OBJECTIVE STATEMENT
84-year-old female with past medical history of Parkinson's, obesity, GERD, hypothyroidism, history of PE not on anticoagulation, who presents for mechanical ground-level fall witnessed on home security camera.  Patient was ambulating with a walker and fell backwards.  Without any loss of consciousness.  Was helped up afterwards.  Complains of pain to the back otherwise denies any chest pain, shortness of breath, headache, lightheadedness, numbness, tingling, weakness, incontinence, urinary retention, saddle anesthesia.

## 2025-01-05 NOTE — H&P ADULT - CONVERSATION DETAILS
Spoke with patient at bedside. She requests to be DNR/DNI. She expresses desire to go peacefully without aggressive intervention if it comes to it. Signed HUMBERTO, in chart. DNR/DNI order placed.

## 2025-01-05 NOTE — ED PROVIDER NOTE - CLINICAL SUMMARY MEDICAL DECISION MAKING FREE TEXT BOX
Patient presents status post mechanical fall as documented, no LOC, complaining primarily of back pain.  On arrival, patient was afebrile, hemodynamic stable, grossly neurovascularly intact, but tenderness noted to the left posterior ribs.  Obtained labs which were grossly unremarkable including no significant leukocytosis, anemia, signs of dehydration/NOEMÍ, transaminitis or significant electrolyte abnormalities.  Trauma workup revealed 2 rib fractures as well as bilateral congestive changes, possibly pneumonia, no other acute traumatic injury.  Pain otherwise controlled in the ED, however patient had difficulty ambulating since the fall due to weakness, persistent pain in the ribs and therefore will require admission for further management.  Patient agreeable with plan.  Hemodynamically stable at time of admission.

## 2025-01-05 NOTE — H&P ADULT - NSHPPHYSICALEXAM_GEN_ALL_CORE
GENERAL: NAD, lying in bed   HEAD:  Atraumatic, normocephalic  EYES: EOMI, conjunctiva and sclera clear  NECK: Supple, no JVD  HEART: Regular rate and rhythm, no murmurs, rubs, or gallops  LUNGS: Unlabored respirations.  Clear to auscultation bilaterally, no crackles, wheezing, or rhonchi  ABDOMEN: Soft, nontender, nondistended, +BS  EXTREMITIES: 2+ peripheral pulses bilaterally. No edema. B/L UE digits + rheumatoid changes   NERVOUS SYSTEM:  A&Ox3, no focal deficits   SKIN: No rashes or lesions GENERAL: NAD, lying in bed   HEAD:  Atraumatic, normocephalic  EYES: EOMI, conjunctiva and sclera clear  NECK: Supple, no JVD  HEART: Regular rate and rhythm, systolic murmur   LUNGS: Unlabored respirations.  Clear to auscultation bilaterally, no crackles, wheezing, or rhonchi  ABDOMEN: Soft, nontender, nondistended, +BS  EXTREMITIES: 2+ peripheral pulses bilaterally. No edema. B/L UE digits + rheumatoid changes   NERVOUS SYSTEM:  A&Ox3, no focal deficits   SKIN: No rashes or lesions

## 2025-01-05 NOTE — ED ADULT TRIAGE NOTE - BMI (KG/M2)
[address] [facility]    [facility] Therapy Daily Treatment Note  Date:  3/16/2021    Patient Name:  Elvin Troncoso    :  1957  MRN: [id number]  Restrictions/Precautions:    Medical/Treatment Diagnosis Information:  Referring Physician: Referring Practitioner: Codi Fields NP                                       Evaluation Date: 2021                                       Medical Diagnosis Information:  Diagnosis: Herniated Lumbar Disc (ICD-722.100)(ORJ00-Z44.26)   Treatment Diagnosis: M54.5, M25.651                                         Insurance information: PT Insurance Information: PT BENEFITS  FACILITY/ [facility]/ EFFECTIVE 2016/ ACTIVE/ DED 2400 .36/ PAYS 80%/ OOP 5900 .36/ NO VISIT LIMIT MED NEC/ 0 AUTH/ ALL CODES BILLABLE/ TELEHEALTH IS NOT COVERED [facility] REF# [id number]/ 2021 PAG  Has the plan of care been signed (Y/N):        []  Yes  [x]  No     Date of Patient follow up with Physician: 3/8/21 Marcela      Is this a Progress Report:     []  Yes  [x]  No        If Yes:  Date Range for reporting period:  Beginning  Ending    Progress report will be due (10 Rx or 30 days whichever is less):       Recertification will be due (POC Duration  / 90 days whichever is less): 21        Visit # Insurance Allowable Auth Required   8 Unlimited per [place] []  Yes []  No        Functional Scale:    Date assessed:  AUGUSTIN 60% disability    21     Latex Allergy:  [x]NO      []YES  Preferred Language for Healthcare:   [x]English       []other:    Pain level:  0/10     SUBJECTIVE:  Pt states that he felt better than average on walks over the weekend. Took a longer walk on  and had some hills, about 5-6 miles. Pt walked this morning, felt very tight, more locking/seizing feelings.  Glute and calf felt like they were seizing up, was able to stretch for 5-10 seconds and and then was fine to continue walking.      OBJECTIVE:      Standing Exam Normal Abnormal N/A     Toe walk       x     Heel Walk     x     Side bending x         Pelvic Height x         Fwd Bend- (aberrant juttering or innominate mvmt) x         Extension x         Trendelenburg x         Kemps/Quadrant     x     Stork   x   Minimal pelvic tilt B   SLS/SLS w rotation     x                             Seated Exam Normal Abnormal N/A Comments   Pelvic Height     x     Seated Rotation     x     Seated flexion x         B hip IR     x                             Supine Exam Normal Abnormal N/A Comments   Hip flexion x         Abduction   x   + for tightness   ER   x   + for tightness   IR   x   + for tightness   TAN/Ankush   x   + for tightness  R knee 32cm from table  L knee 18 cm from table   Hip scour     x     SLR x         Crossed SLR x         Supine to sit     x     SI distraction/compression     x     Hip thrust     x                             Prone Exam Normal Abnormal N/A Comments   Prone knee bend     x     Prone hip IR     x     B Achilles reflex/Pheasant     x     PA/Spring     x     Prone Instability test     x     Sacral Spring/thrust     x     Ely's   x   + for tightness B   Posterior chain NM firing       R: glute/HS simultaneous, ES  L: glute, HS, ES      ROM LEFT RIGHT Comments   Lumbar Flex     17 cm using tape measure, C7 to L5   Lumbar Ext     6 cm using tape measure, C7 to L5   Side Bend [facility]     Rotation     Not assessed                          ROM LEFT RIGHT Comments   Hip Flexion WFL WFL Supine   Hip Abd 29 22 Supine   Hip ER 47 20 Supine 90/90   Hip IR 25 19 Supine 90/90   Hip Extension     Not assessed   Knee Ext     Not assessed   Knee Flex     Not assessed   Hamstring Flex 60 72 SLR                                    Strength LEFT RIGHT Comments   Multifidus     Not assessed   Transverse Ab     Not assessed   Hip Flexors 4+/5 4+/5     Hip Abductors 4/5  4-/5 glute med 4/5  4-/5 glute med     Hip Extensors 4/5 4/5     Quads 4+/5 4+/5     Hamstrings 4+/5 4+/5     Hip IR 4/5 4+/5     Hip ER 4+/5 4/5     Plantarflexion 4+/5 4+/5     Dorsiflexion 4+/5 4+/5        Myotomes Normal Abnormal Comments   Hip flexion (L1-L2) x       Knee extension (L2-L4) x       Dorsiflexion (L4-L5) x       Great Toe Ext (L5)     Not assessed   Ankle Eversion (S1-S2)     Not assessed   Ankle PF(S1-S2) x          Not assessed  Dermatomes Normal Abnormal Comments   inguinal area (L1)          anterior mid-thigh (L2)         distal ant thigh/med knee (L3)         medial lower leg and foot (L4)         lateral lower leg and foot (L5)         posterior calf (S1)         medial calcaneus (S2)               Neural dynamic tension testing Normal Abnormal Comments   Slump Test  - Degree of knee flexion:  x       SLR          0-30 x       30-70 x       Femoral nerve (L2-4) x          Not assessed  Reflexes Normal Abnormal Comments   S1-2 Seated achilles         S1-2 Prone knee bend         L3-4 Patellar tendon         C5-6 Biceps         C6 Brachioradialis         C7-8 Triceps         Clonus         Babinski         Mancia's            Joint mobility:              []?Normal                      [x]? Hypo R hip d/t AAYUSH             []?Hyper     Palpation: None     Functional Mobility/Transfers: Independent with all mobility     Posture: WFL     Gait: (include devices/WB status) WFL      RESTRICTIONS/PRECAUTIONS: none    Exercises/Interventions:   ROM/stretches     Quad 3x30\" R Prone with strap   Figure 4 3x30\" B    Hip IR  Hooklying, L leg providing OP into IR   ITB stretch 3x30\"  Supine, using LLE for OP   Hamstring 3x30\" R Seated     Standing  HEP   Sciatic nerve glide          Strengthening     Clamshells  Sidelying  Blue loop above knees   Reverse clamshell 3x10 6# Pillow between knees   Bridges       At half wall for support  HEP   TA brace     Supine Deadbug  One leg at 90/90, heel tap to table     HEP   Sidelying hip/knee flexion 3x10    ABD SLR with ext     Prone SLR-EOB 3x10 3#    sidelying ABD circles 3x10 CW/CCW    Prone hip ER/IR x15 1#    SL squat 3x10 To treatment table, LLE on slider  Green band around R knee for tactile cue to ER leg        Planks  On forearms; 30\" break between reps   Side plank  Popping in L shoulder when on L side, hold at this time    R: 72\"/63\"   Superman 10x10\" Trunk ext only   Bear plank 3xfatigue         Blue loop above knees  HEP   Monster walks 2 passes Blue loop above knees  Forward/backward   Standing hip ER 3x10 blue band above knees Feet hip width apart, let RLE fall into valgus, push out against band   Wall sits Blue loop above knees  86\"/78\"/86\"   Lateral step up x20 L2 Focus on glute squeeze   SL leg press R only           SLS    RB          Manual      Hip IR stretch  Supine, 90/90               Plan for next session: progress as tolerated; focus on glute and core strength/endurance    Therapeutic Exercise and NMR EXR  [x] (63334) Provided verbal/tactile cueing for activities related to strengthening, flexibility, endurance, ROM  for improvements in proximal hip and core control with self care, mobility, lifting and ambulation.  [] (06812) Provided verbal/tactile cueing for activities related to improving balance, coordination, kinesthetic sense, posture, motor skill, proprioception  to assist with core control in self care, mobility, lifting, and ambulation. Therapeutic Activities:    [] (42155 or 23145) Provided verbal/tactile cueing for activities related to improving balance, coordination, kinesthetic sense, posture, motor skill, proprioception and motor activation to allow for proper function  with self care and ADLs  [] (92517) Provided training and instruction to the patient for proper core and proximal hip recruitment and positioning with ambulation re-education     Home Exercise Program:  Access Code: [email or web address]   URL: [email or web address]   Date: 02/17/2021   Prepared by: Laura Chester     2/17/21. Printed handout provided. Updated 2/24/21. Patient has access via mobile lester. Updated 3/5/21.      Exercises   Seated Table Hamstring Stretch - 3 reps - 30 second hold - 1x daily - 7x weekly   Supine Figure 4 Piriformis Stretch - 3 reps - 1 sets - 30 second hold - 1x daily - 7x weekly   Prone Quadriceps Stretch with Strap - 3 reps - 1 sets - 30 second hold - 1x daily - 7x weekly   Standing Quadratus Lumborum Stretch with Doorway - 10 reps - 3 sets - 1x daily - 7x weekly   Clamshell with Resistance - 10 reps - 3 sets - 1x daily - 7x weekly   Supine Bridge - 10 reps - 3 sets - 1x daily - 7x weekly   Supine March - 10 reps - 3 sets - 1x daily - 7x weekly   Sidelying Bent Knee Hip Flexion - 10 reps - 3 sets - 1x daily - 7x weekly   Side Stepping with Resistance at Thighs - 10 reps - 3 sets - 1x daily - 7x weekly   Sidelying Knee Flexion and Extension in Abduction - 10 reps - 3 sets - 1x daily - 7x weekly   Sidelying Hip Abduction - 10 reps - 3 sets - 1x daily - 7x weekly   Squat - 10 reps - 3 sets - 1x daily - 7x weekly   Forward Monster Walks - 10-15 reps - 2-3 sets - 1x daily - 7x weekly   Single Leg Stance - 3 reps - 1 sets - 30 second hold - 1x daily - 7x weekly   Sidelying Reverse Clamshell - 10 reps - 3 sets - 1x daily - 7x weekly        [x] (44232) Reviewed/Progressed HEP activities related to strengthening, flexibility, endurance, ROM of core, proximal hip and LE for functional self-care, mobility, lifting and ambulation   [] (65779) Reviewed/Progressed HEP activities related to improving balance, coordination, kinesthetic sense, posture, motor skill, proprioception of core, proximal hip and LE for self care, mobility, lifting, and ambulation      Manual Treatments:  PROM / STM / Oscillations-Mobs:  G-I, II, III, IV (PA's, Inf., Post.)  [] ([id number]) Provided manual therapy to mobilize proximal hip and LS spine soft tissue/joints for the purpose of modulating pain, promoting relaxation,  increasing ROM, reducing/eliminating soft tissue swelling/inflammation/restriction, improving soft tissue extensibility and allowing for proper ROM for normal function with self care, mobility, lifting and ambulation. Modalities: none      Charges:  Timed Code Treatment Minutes: 45   Total Treatment Minutes: 45   Time in: 11:02  Time out: 11:53    [] EVAL (LOW) 60034 (typically 20 minutes face-to-face)  [] EVAL (MOD) 15199 (typically 30 minutes face-to-face)  [] EVAL (HIGH) 80788 (typically 45 minutes face-to-face)  [] RE-EVAL     [x] PN(84793) x 2    [] IONTO  [] NMR (44749) x     [] VASO  [] Manual (33136) x      [] Other:  [x] TA x 1     [] Mech Traction (92475)  [] ES(attended) (97761)      [] ES (un) (01172):     Goals:   Patient stated goal: to walk long distances painfree    []? Progressing: []? Met: []? Not Met: []? Adjusted     Therapist goals for Patient:   Short Term Goals: To be achieved in: 4 weeks  3/14/21  1. Independent in HEP and progression per patient tolerance, in order to prevent re-injury. (at 1 week 2/24/21)  []? Progressing: []? Met: []? Not Met: []? Adjusted   2. Patient will have demonstrate PROM [facility] [place] for R hip to improve hip mobility. []? Progressing: []? Met: []? Not Met: []? Adjusted   3. Patient will tolerate 2 miles of walking without increased symptoms or restriction. []? Progressing: []? Met: []? Not Met: []? Adjusted     Long Term Goals: To be achieved in: 8 weeks 4/14/21  1. Disability index score of 0% for the AUGUSTIN to assist with reaching prior level of function. []? Progressing: []? Met: []? Not Met: []? Adjusted  2. Patient will demonstrate increased R hip AROM to [facility] and normal LS mobility to allow for proper joint functioning as indicated by patients Functional Deficits.   []? Progressing: []? Met: []? Not Met: []? Adjusted   3.  Patient will demonstrate an increase in Strength to at least 4+/5 proximal hip and core activation to allow for proper functional mobility as indicated by patients Functional Deficits. []? Progressing: []? Met: []? Not Met: []? Adjusted   4. Patient will return to walking 4 miles without increased symptoms or restriction. []? Progressing: []? Met: []? Not Met: []? Adjusted  5. Patient will return to hiking without increased symptoms or restriction. []? Progressing: []? Met: []? Not Met: []? Adjusted         Overall Progression Towards Functional goals/ Treatment Progress Update:  [] Patient is progressing as expected towards functional goals listed. [] Progression is slowed due to complexities/Impairments listed. [] Progression has been slowed due to co-morbidities. [x] Plan just implemented, too soon to assess goals progression <30days   [] Goals require adjustment due to lack of progress  [] Patient is not progressing as expected and requires additional follow up with physician  [] Other    Prognosis for POC: [x] Good [] Fair  [] Poor      Patient requires continued skilled intervention: [x] Yes  [] No    Treatment/Activity Tolerance:  [] Patient able to complete treatment  [] Patient limited by fatigue  [] Patient limited by pain     [] Patient limited by other medical complications  [] Other: Pt continues to exhibit R knee valgus in SL positions and with balance exercises. Pt continues to be fatigued with glute based program, pt would benefit from higher reps for greater endurance gains but pt is often fatigued following 10 reps, most notably on table based glute isolated exercises. Pt continues to tolerate exercise program without an exacerbation of back/leg pain. Pt to benefit from PT to address ROM and strength deficits to allow for painfree prolonged walking.      Prognosis: [] Good [] Fair  [] Poor    Patient Requires Follow-up: [x] Yes  [] No    PLAN: See eval  [x] Continue per plan of care [] Alter current plan (see comments)  [] Plan of care initiated [] Hold pending MD visit [] Discharge    Electronically signed by: Chacha Roque PT, DPT, VALENTIN  Physical Therapist  [phone]  [email or web address]    *If patient does not return for further follow ups after this date. Please consider this as the patients discharge from physical therapy. 28.9

## 2025-01-05 NOTE — ED PROVIDER NOTE - CARE PLAN
Principal Discharge DX:	Fall  Secondary Diagnosis:	Rib fracture   1 Principal Discharge DX:	Fall  Secondary Diagnosis:	Rib fracture  Secondary Diagnosis:	Pneumonia

## 2025-01-05 NOTE — H&P ADULT - ASSESSMENT
84-year-old female with past medical history of Parkinson's, fibromyalgia, RA, obesity, GERD, hypothyroidism, history of PE not on anticoagulation, who presents for mechanical ground-level fall witnessed on home security camera.  Patient was ambulating without her walker and fell backwards.  Pt fell on her buttock and then rolled back and hit her head against the door. No LOC, dizziness, syncope.  Was helped up afterwards.  Complains of pain to the back otherwise denies any chest pain, shortness of breath, headache, lightheadedness, numbness, tingling, weakness, incontinence, urinary retention, saddle anesthesia. Also denies any URI symptoms, including fever, cough. Pt said he has had multiple episodes of aspiration PNA with 2-3 admissions over the past year.     #Rib fracture, secondary to mechanical fall  #Possible PNA  #Hx of recurrent aspiration PNA  - mechanical fall, no LOC  - vitals stable on admission  - CTH 1/5/25: No evidence of acute intracranial pathology.  - CT Cervical Spine No Cont 1/5/25: No acute fracture or subluxation of the cervical spine.  - CT Chest w/ IV cont 1/5/25: Acute mildly displaced fractures left posterior 10th and 11th ribs. Hazy bilateral groundglass/atelectatic pulmonary changes.   - CTAP 1/5/25: No CT evidence for acute intra-abdominal or pelvic pathology.  - XRAY Pelvis AP 1/5/25: No fractures seen.  - WBC 12.03  - cw ceftriaxone and azithromycin  - f/u BCx, Sputum cx  - f/u UA, UCx  - f/u orthostatics    #Hyperkalemia  - K 5.9  - lokelma  - trend BPM    # Parkinson's disease   - c/w with home meds in AM once confirmed    # Fibromyalgia   - c/w with home meds in AM once confirmed    #Hypothyroidism   - c/w with home meds in AM once confirmed  - f/u TSH    #RA  - c/w with home meds in AM once confirmed     #MISC  #DVT prophylaxis: Lovenox  #GI prophylaxis: PPI  #Diet: DASH  #Activity: increase as nathalia  #Code status: DNR/DNI  #Disposition: admit to medicine  **PLEASE CONFIRM MED REC IN AM. Pt said her son has the list. Tried to call him at time of admission but no answer. Pt said dosing and some medications may have changed since last admission.     Spoke with patient at bedside. She requests to be DNR/DNI. She expresses desire to go peacefully without aggressive intervention if it comes to it. Signed HUMBERTO, in chart. DNR/DNI order placed.    84-year-old female with past medical history of Parkinson's, fibromyalgia, RA, obesity, GERD, hypothyroidism, history of PE not on anticoagulation, who presents for mechanical ground-level fall witnessed on home security camera.  Patient was ambulating without her walker and fell backwards.  Pt fell on her buttock and then rolled back and hit her head against the door. No LOC, dizziness, syncope.  Was helped up afterwards.  Complains of pain to the back otherwise denies any chest pain, shortness of breath, headache, lightheadedness, numbness, tingling, weakness, incontinence, urinary retention, saddle anesthesia. Also denies any URI symptoms, including fever, cough. Pt said he has had multiple episodes of aspiration PNA with 2-3 admissions over the past year.     #Rib fracture, secondary to mechanical fall  #Possible PNA  #Hx of recurrent aspiration PNA  - mechanical fall, no LOC  - vitals stable on admission  - CTH 1/5/25: No evidence of acute intracranial pathology.  - CT Cervical Spine No Cont 1/5/25: No acute fracture or subluxation of the cervical spine.  - CT Chest w/ IV cont 1/5/25: Acute mildly displaced fractures left posterior 10th and 11th ribs. Hazy bilateral groundglass/atelectatic pulmonary changes.   - CTAP 1/5/25: No CT evidence for acute intra-abdominal or pelvic pathology.  - XRAY Pelvis AP 1/5/25: No fractures seen.  - WBC 12.03  - cw ceftriaxone and azithromycin  - f/u BCx, Sputum cx  - f/u UA, UCx  - f/u orthostatics    #Hyperkalemia  - K 5.9  - lokelma  - trend BPM    # Parkinson's disease   - c/w with home meds in AM once confirmed    # Fibromyalgia   - c/w with home meds in AM once confirmed    #Hypothyroidism   - c/w with home meds in AM once confirmed  - f/u TSH    #RA  - c/w with home meds in AM once confirmed    #Systolic Murmur  - Last echo 2019: EF 67%  - f/u repeat echo     #MISC  #DVT prophylaxis: Lovenox  #GI prophylaxis: PPI  #Diet: DASH  #Activity: increase as nathalia  #Code status: DNR/DNI  #Disposition: admit to medicine  **PLEASE CONFIRM MED REC IN AM. Pt said her son has the list. Tried to call him at time of admission but no answer. Pt said dosing and some medications may have changed since last admission. Pt said she uses [facility] [phone] but when I called, the pharmacy said they only had a record of one RX for abx there, not her primary pharmacy     Spoke with patient at bedside. She requests to be DNR/DNI. She expresses desire to go peacefully without aggressive intervention if it comes to it. Signed HUMBERTO, in chart. DNR/DNI order placed.

## 2025-01-05 NOTE — ED PROVIDER NOTE - CONSIDERATION OF ADMISSION OBSERVATION
Consideration of Admission/Observation Patient s/p fall with rib fx and signs of PNA, will require admission

## 2025-01-05 NOTE — H&P ADULT - ATTENDING COMMENTS
Assessment    Mechanical fall likely secondary to parkinsons  Possible aspiration pna, hx of recurrent aspiration  Rib fracture secondary to above  Hyperkalemia  Parkinson's disease  Fibromyalgia  Hypothyroid  RA    Plan    - f/u orthostatics, c/w home parkinsons meds, f/u echo has systolic murmur possible AS  - c/w abx for possible aspiration, f/u speech and swallow  - received lokelma f/u repeat potassium  - c/w home fibro/RA meds  - synthroid    Pending: orthostatics, echo, potassium, PT    # DVT PPX: lovenox Assessment    Mechanical fall likely secondary to parkinsons  Possible aspiration pna, hx of recurrent aspiration  Rib fracture secondary to above  Hyperkalemia  Parkinson's disease  Fibromyalgia  Hypothyroid  RA    Plan    - f/u orthostatics, c/w home parkinsons meds, f/u echo has systolic murmur possible AS  - c/w abx for possible aspiration, f/u speech and swallow  - received lokelma f/u repeat potassium  - c/w home fibro/RA meds  - synthroid    Pending: orthostatics, echo, potassium, PT    # DVT PPX: lovenox    GENERAL: NAD, lying in bed comfortably  HEAD:  Atraumatic, normocephalic  NERVOUS SYSTEM:  A&Ox3, moving all extremities, no focal deficits   EYES: EOMI, PERRL  NECK: Supple, trachea midline, no JVD  HEART: Regular rate and rhythm, 4/6 systolic murmur  LUNGS: Clear to auscultation bilaterally, no crackles, wheezing, or rhonchi  ABDOMEN: Soft, nontender, nondistended, +BS  EXTREMITIES: 2+ peripheral pulses bilaterally. No clubbing, cyanosis, or edema    75 minutes spent on review of labs, imaging studies, old records, obtaining history, personally examining patient, counselling and communicating with patient/ family, entering orders for medications/tests/etc, discussions with other health care providers, documentation in electronic health records, independent interpretation of labs, imaging/procedure results and care coordination.

## 2025-01-05 NOTE — ED ADULT TRIAGE NOTE - HEIGHT IN CM
Additional Notes: Patient consent was obtained to proceed with the visit and recommended plan of care after discussion of all risks and benefits, including the risks of COVID-19 exposure.
Detail Level: Simple
149.86

## 2025-01-06 PROBLEM — Z86.69 PERSONAL HISTORY OF OTHER DISEASES OF THE NERVOUS SYSTEM AND SENSE ORGANS: Chronic | Status: ACTIVE | Noted: 2024-09-10

## 2025-01-06 LAB
-  STAPHYLOCOCCUS EPIDERMIDIS: SIGNIFICANT CHANGE UP
ALBUMIN SERPL ELPH-MCNC: 3.8 G/DL — SIGNIFICANT CHANGE UP (ref 3.5–5.2)
ALP SERPL-CCNC: 81 U/L — SIGNIFICANT CHANGE UP (ref 30–115)
ALT FLD-CCNC: 10 U/L — SIGNIFICANT CHANGE UP (ref 0–41)
ANION GAP SERPL CALC-SCNC: 10 MMOL/L — SIGNIFICANT CHANGE UP (ref 7–14)
APPEARANCE UR: CLEAR — SIGNIFICANT CHANGE UP
AST SERPL-CCNC: 29 U/L — SIGNIFICANT CHANGE UP (ref 0–41)
BILIRUB SERPL-MCNC: 0.5 MG/DL — SIGNIFICANT CHANGE UP (ref 0.2–1.2)
BILIRUB UR-MCNC: NEGATIVE — SIGNIFICANT CHANGE UP
BUN SERPL-MCNC: 30 MG/DL — HIGH (ref 10–20)
CALCIUM SERPL-MCNC: 8.3 MG/DL — LOW (ref 8.4–10.4)
CHLORIDE SERPL-SCNC: 101 MMOL/L — SIGNIFICANT CHANGE UP (ref 98–110)
CO2 SERPL-SCNC: 29 MMOL/L — SIGNIFICANT CHANGE UP (ref 17–32)
COLOR SPEC: YELLOW — SIGNIFICANT CHANGE UP
CREAT SERPL-MCNC: 1 MG/DL — SIGNIFICANT CHANGE UP (ref 0.7–1.5)
DIFF PNL FLD: NEGATIVE — SIGNIFICANT CHANGE UP
EGFR: 56 ML/MIN/1.73M2 — LOW
GLUCOSE SERPL-MCNC: 80 MG/DL — SIGNIFICANT CHANGE UP (ref 70–99)
GLUCOSE UR QL: NEGATIVE MG/DL — SIGNIFICANT CHANGE UP
GRAM STN FLD: ABNORMAL
HCT VFR BLD CALC: 38.8 % — SIGNIFICANT CHANGE UP (ref 37–47)
HGB BLD-MCNC: 12.4 G/DL — SIGNIFICANT CHANGE UP (ref 12–16)
KETONES UR-MCNC: NEGATIVE MG/DL — SIGNIFICANT CHANGE UP
LEUKOCYTE ESTERASE UR-ACNC: ABNORMAL
MAGNESIUM SERPL-MCNC: 2.1 MG/DL — SIGNIFICANT CHANGE UP (ref 1.8–2.4)
MCHC RBC-ENTMCNC: 31.4 PG — HIGH (ref 27–31)
MCHC RBC-ENTMCNC: 32 G/DL — SIGNIFICANT CHANGE UP (ref 32–37)
MCV RBC AUTO: 98.2 FL — SIGNIFICANT CHANGE UP (ref 81–99)
METHOD TYPE: SIGNIFICANT CHANGE UP
NITRITE UR-MCNC: NEGATIVE — SIGNIFICANT CHANGE UP
NRBC # BLD: 0 /100 WBCS — SIGNIFICANT CHANGE UP (ref 0–0)
PH UR: 5.5 — SIGNIFICANT CHANGE UP (ref 5–8)
PLATELET # BLD AUTO: 220 K/UL — SIGNIFICANT CHANGE UP (ref 130–400)
PMV BLD: 10.7 FL — HIGH (ref 7.4–10.4)
POTASSIUM SERPL-MCNC: 4.7 MMOL/L — SIGNIFICANT CHANGE UP (ref 3.5–5)
POTASSIUM SERPL-SCNC: 4.7 MMOL/L — SIGNIFICANT CHANGE UP (ref 3.5–5)
PROT SERPL-MCNC: 6.4 G/DL — SIGNIFICANT CHANGE UP (ref 6–8)
PROT UR-MCNC: NEGATIVE MG/DL — SIGNIFICANT CHANGE UP
RBC # BLD: 3.95 M/UL — LOW (ref 4.2–5.4)
RBC # FLD: 13.5 % — SIGNIFICANT CHANGE UP (ref 11.5–14.5)
SODIUM SERPL-SCNC: 140 MMOL/L — SIGNIFICANT CHANGE UP (ref 135–146)
SP GR SPEC: >1.03 — HIGH (ref 1–1.03)
SPECIMEN SOURCE: SIGNIFICANT CHANGE UP
TSH SERPL-MCNC: 9.54 UIU/ML — HIGH (ref 0.27–4.2)
UROBILINOGEN FLD QL: 0.2 MG/DL — SIGNIFICANT CHANGE UP (ref 0.2–1)
WBC # BLD: 7.82 K/UL — SIGNIFICANT CHANGE UP (ref 4.8–10.8)
WBC # FLD AUTO: 7.82 K/UL — SIGNIFICANT CHANGE UP (ref 4.8–10.8)

## 2025-01-06 PROCEDURE — 99232 SBSQ HOSP IP/OBS MODERATE 35: CPT

## 2025-01-06 RX ORDER — ACETAMINOPHEN 80 MG/.8ML
650 SOLUTION/ DROPS ORAL EVERY 6 HOURS
Refills: 0 | Status: DISCONTINUED | OUTPATIENT
Start: 2025-01-06 | End: 2025-01-07

## 2025-01-06 RX ORDER — AZITHROMYCIN MONOHYDRATE 200 MG/5ML
500 POWDER, FOR SUSPENSION ORAL EVERY 24 HOURS
Refills: 0 | Status: DISCONTINUED | OUTPATIENT
Start: 2025-01-06 | End: 2025-01-06

## 2025-01-06 RX ORDER — LEVODOPA AND CARBIDOPA 145; 36.25 MG/1; MG/1
2 CAPSULE, EXTENDED RELEASE ORAL EVERY 6 HOURS
Refills: 0 | Status: DISCONTINUED | OUTPATIENT
Start: 2025-01-06 | End: 2025-01-06

## 2025-01-06 RX ORDER — LEVODOPA AND CARBIDOPA 145; 36.25 MG/1; MG/1
1 CAPSULE, EXTENDED RELEASE ORAL DAILY
Refills: 0 | Status: DISCONTINUED | OUTPATIENT
Start: 2025-01-06 | End: 2025-01-07

## 2025-01-06 RX ORDER — PANTOPRAZOLE 40 MG/1
40 TABLET, DELAYED RELEASE ORAL
Refills: 0 | Status: DISCONTINUED | OUTPATIENT
Start: 2025-01-06 | End: 2025-01-07

## 2025-01-06 RX ORDER — MORPHINE SULFATE 15 MG
15 TABLET, EXTENDED RELEASE ORAL DAILY
Refills: 0 | Status: DISCONTINUED | OUTPATIENT
Start: 2025-01-07 | End: 2025-01-07

## 2025-01-06 RX ORDER — PROPRANOLOL 80 MG/1
1 CAPSULE, EXTENDED RELEASE ORAL
Refills: 0 | DISCHARGE

## 2025-01-06 RX ORDER — LEVODOPA AND CARBIDOPA 145; 36.25 MG/1; MG/1
2 CAPSULE, EXTENDED RELEASE ORAL
Refills: 0 | Status: DISCONTINUED | OUTPATIENT
Start: 2025-01-06 | End: 2025-01-07

## 2025-01-06 RX ORDER — MORPHINE SULFATE 15 MG
30 TABLET, EXTENDED RELEASE ORAL AT BEDTIME
Refills: 0 | Status: DISCONTINUED | OUTPATIENT
Start: 2025-01-06 | End: 2025-01-07

## 2025-01-06 RX ORDER — QUETIAPINE FUMARATE 100 MG/1
3 TABLET, FILM COATED ORAL
Refills: 0 | DISCHARGE

## 2025-01-06 RX ORDER — CEFTRIAXONE SODIUM 1 G/1
1000 INJECTION, POWDER, FOR SOLUTION INTRAMUSCULAR; INTRAVENOUS EVERY 24 HOURS
Refills: 0 | Status: DISCONTINUED | OUTPATIENT
Start: 2025-01-06 | End: 2025-01-06

## 2025-01-06 RX ORDER — MONTELUKAST SODIUM 10 MG/1
10 TABLET, FILM COATED ORAL DAILY
Refills: 0 | Status: DISCONTINUED | OUTPATIENT
Start: 2025-01-06 | End: 2025-01-07

## 2025-01-06 RX ORDER — ZONISAMIDE 100 MG/1
1 CAPSULE ORAL
Refills: 0 | DISCHARGE

## 2025-01-06 RX ORDER — LEVOTHYROXINE SODIUM 175 UG/1
88 TABLET ORAL DAILY
Refills: 0 | Status: DISCONTINUED | OUTPATIENT
Start: 2025-01-06 | End: 2025-01-06

## 2025-01-06 RX ORDER — ZONISAMIDE 100 MG/1
50 CAPSULE ORAL DAILY
Refills: 0 | Status: DISCONTINUED | OUTPATIENT
Start: 2025-01-06 | End: 2025-01-07

## 2025-01-06 RX ORDER — RASAGILINE 1 MG/1
1 TABLET ORAL DAILY
Refills: 0 | Status: DISCONTINUED | OUTPATIENT
Start: 2025-01-06 | End: 2025-01-07

## 2025-01-06 RX ORDER — QUETIAPINE FUMARATE 100 MG/1
75 TABLET, FILM COATED ORAL AT BEDTIME
Refills: 0 | Status: DISCONTINUED | OUTPATIENT
Start: 2025-01-06 | End: 2025-01-07

## 2025-01-06 RX ORDER — ENOXAPARIN SODIUM 60 MG/.6ML
40 INJECTION INTRAVENOUS; SUBCUTANEOUS EVERY 24 HOURS
Refills: 0 | Status: DISCONTINUED | OUTPATIENT
Start: 2025-01-06 | End: 2025-01-07

## 2025-01-06 RX ORDER — ESCITALOPRAM OXALATE 10 MG/1
40 TABLET ORAL DAILY
Refills: 0 | Status: DISCONTINUED | OUTPATIENT
Start: 2025-01-06 | End: 2025-01-07

## 2025-01-06 RX ORDER — ROPINIROLE HYDROCHLORIDE 1 MG/1
1 TABLET, FILM COATED ORAL
Refills: 0 | Status: DISCONTINUED | OUTPATIENT
Start: 2025-01-06 | End: 2025-01-07

## 2025-01-06 RX ORDER — LEVOTHYROXINE SODIUM 175 UG/1
100 TABLET ORAL DAILY
Refills: 0 | Status: DISCONTINUED | OUTPATIENT
Start: 2025-01-06 | End: 2025-01-07

## 2025-01-06 RX ADMIN — LEVODOPA AND CARBIDOPA 1 TABLET(S): 145; 36.25 CAPSULE, EXTENDED RELEASE ORAL at 17:51

## 2025-01-06 RX ADMIN — LEVOTHYROXINE SODIUM 100 MICROGRAM(S): 175 TABLET ORAL at 16:47

## 2025-01-06 RX ADMIN — QUETIAPINE FUMARATE 75 MILLIGRAM(S): 100 TABLET, FILM COATED ORAL at 22:51

## 2025-01-06 RX ADMIN — SODIUM ZIRCONIUM CYCLOSILICATE 10 GRAM(S): 10 POWDER, FOR SUSPENSION ORAL at 00:36

## 2025-01-06 RX ADMIN — LEVODOPA AND CARBIDOPA 2 TABLET(S): 145; 36.25 CAPSULE, EXTENDED RELEASE ORAL at 11:46

## 2025-01-06 RX ADMIN — ENOXAPARIN SODIUM 40 MILLIGRAM(S): 60 INJECTION INTRAVENOUS; SUBCUTANEOUS at 11:46

## 2025-01-06 RX ADMIN — MONTELUKAST SODIUM 10 MILLIGRAM(S): 10 TABLET, FILM COATED ORAL at 13:57

## 2025-01-06 RX ADMIN — ZONISAMIDE 50 MILLIGRAM(S): 100 CAPSULE ORAL at 16:50

## 2025-01-06 RX ADMIN — Medication 30 MILLIGRAM(S): at 22:51

## 2025-01-06 RX ADMIN — PANTOPRAZOLE 40 MILLIGRAM(S): 40 TABLET, DELAYED RELEASE ORAL at 06:21

## 2025-01-06 RX ADMIN — Medication 30 MILLIGRAM(S): at 23:51

## 2025-01-06 RX ADMIN — ESCITALOPRAM OXALATE 40 MILLIGRAM(S): 10 TABLET ORAL at 13:58

## 2025-01-06 RX ADMIN — LEVODOPA AND CARBIDOPA 2 TABLET(S): 145; 36.25 CAPSULE, EXTENDED RELEASE ORAL at 06:21

## 2025-01-06 RX ADMIN — ROPINIROLE HYDROCHLORIDE 1 MILLIGRAM(S): 1 TABLET, FILM COATED ORAL at 17:50

## 2025-01-06 RX ADMIN — RASAGILINE 1 MILLIGRAM(S): 1 TABLET ORAL at 17:52

## 2025-01-06 NOTE — PROGRESS NOTE ADULT - ASSESSMENT
84-year-old female with past medical history of Parkinson's, fibromyalgia, RA, obesity, GERD, hypothyroidism, history of PE not on anticoagulation, who presents for mechanical ground-level fall witnessed on home security camera.  Patient was ambulating without her walker and fell backwards.  Pt fell on her buttock and then rolled back and hit her head against the door. No LOC, dizziness, syncope.  Was helped up afterwards.  Complains of pain to the back otherwise denies any chest pain, shortness of breath, headache, lightheadedness, numbness, tingling, weakness, incontinence, urinary retention, saddle anesthesia. Also denies any URI symptoms, including fever, cough. Pt said he has had multiple episodes of aspiration PNA with 2-3 admissions over the past year.     #Rib fracture, secondary to mechanical fall  #Hx of recurrent aspiration PNA  - mechanical fall, no LOC  - vitals stable on admission  - CTH 1/5/25: No evidence of acute intracranial pathology.  - CT Cervical Spine No Cont 1/5/25: No acute fracture or subluxation of the cervical spine.  - CT Chest w/ IV cont 1/5/25: Acute mildly displaced fractures left posterior 10th and 11th ribs. Hazy bilateral groundglass/atelectatic pulmonary changes.   - CTAP 1/5/25: No CT evidence for acute intra-abdominal or pelvic pathology.  - XRAY Pelvis AP 1/5/25: No fractures seen.  - WBC 12.03 >   - cw ceftriaxone and azithromycin  - f/u BCx, Sputum cx  - f/u UA, UCx  - f/u orthostatics    #Hyperkalemia  - K 5.9  - lokelma  - trend BPM    # Parkinson's disease   - c/w with home meds in AM once confirmed    # Fibromyalgia   - c/w with home meds in AM once confirmed    #Hypothyroidism   - c/w with home meds in AM once confirmed  - f/u TSH    #RA  - c/w with home meds in AM once confirmed    #Systolic Murmur  - Last echo 2019: EF 67%  - f/u repeat echo     #MISC  #DVT prophylaxis: Lovenox  #GI prophylaxis: PPI  #Diet: DASH  #Activity: increase as nathalia  #Code status: DNR/DNI  #Disposition:    84-year-old female with past medical history of Parkinson's, fibromyalgia, RA, obesity, GERD, hypothyroidism, history of PE not on anticoagulation, who presents for mechanical ground-level fall witnessed on home security camera.  Patient was ambulating without her walker and fell backwards.  Pt fell on her buttock and then rolled back and hit her head against the door. No LOC, dizziness, syncope.  Was helped up afterwards.  Complains of pain to the back otherwise denies any chest pain, shortness of breath, headache, lightheadedness, numbness, tingling, weakness, incontinence, urinary retention, saddle anesthesia. Also denies any URI symptoms, including fever, cough. Pt said he has had multiple episodes of aspiration PNA with 2-3 admissions over the past year.     #Rib fracture, secondary to mechanical fall  #Hx of recurrent aspiration PNA  - mechanical fall, no LOC  - vitals stable on admission  - CTH 1/5/25: No evidence of acute intracranial pathology.  - CT Cervical Spine No Cont 1/5/25: No acute fracture or subluxation of the cervical spine.  - CT Chest w/ IV cont 1/5/25: Acute mildly displaced fractures left posterior 10th and 11th ribs. Hazy bilateral groundglass/atelectatic pulmonary changes.   - CTAP 1/5/25: No CT evidence for acute intra-abdominal or pelvic pathology.  - XRAY Pelvis AP 1/5/25: No fractures seen.  - WBC 12.03 > 7.82  - low suspicion for PNA- d/c ceftriaxone and azithromycin  - f/u BCx  - f/u UA, UCx  - f/u orthostatics  -f/u PT    #Hyperkalemia- resolved  - K 5.9 > 4.7  - trend BPM    #Hypothyroidism   - TSH 9.54  - increased synthroid from 88 to 100mcg  - repeat TFTs OP in 6 weeks    #Systolic Murmur  - Last echo 2019: EF 67%  - f/u repeat echo    # Parkinson's disease   # Fibromyalgia   #RA  - c/w with home meds in AM once confirmed      #MISC  #DVT prophylaxis: Lovenox  #GI prophylaxis: PPI  #Diet: DASH  #Activity: increase as nathalia  #Code status: DNR/DNI    handoff: f/u bcx, UA, orthostatics, PT

## 2025-01-06 NOTE — SWALLOW BEDSIDE ASSESSMENT ADULT - SWALLOW EVAL: DIAGNOSIS
+ toleration observed without overt symptoms of penetration/aspiration for Regular/thins, however known h/o dysphagia with multiple aspiration PNAs.

## 2025-01-06 NOTE — PROGRESS NOTE ADULT - ASSESSMENT
84-year-old woman with past medical history of Parkinson's, fibromyalgia, RA, obesity, GERD, hypothyroidism, history of PE not on anticoagulation, who presents for mechanical ground-level fall witnessed on home security camera.  Patient was ambulating without her walker and fell backwards.  Pt fell on her buttock and then rolled back and hit her head against the door. No LOC, dizziness, syncope.  Was helped up afterwards.  Complains of pain to the back otherwise denies any chest pain, shortness of breath, headache, lightheadedness, numbness, tingling, weakness, incontinence, urinary retention, saddle anesthesia. Also denies any URI symptoms, including fever, cough. Pt said he has had multiple episodes of aspiration PNA with 2-3 admissions over the past year.     # pt is immunocompromised 2/2 age    # there is NO PNEUMONIA  no abx  d/c SP cx  f/u BCx  f/u UCx    # Rib fractures, secondary to mechanical fall w/out LOC  - vitals stable on admission  - CTH 1/5/25: No evidence of acute intracranial pathology.  - CT Cervical Spine No Cont 1/5/25: No acute fracture or subluxation of the cervical spine.  - CT Chest w/ IV cont 1/5/25: Acute mildly displaced fractures left posterior 10th and 11th ribs. Hazy bilateral groundglass/atelectatic pulmonary changes.   - CTAP 1/5/25: No CT evidence for acute intra-abdominal or pelvic pathology.  - XRAY Pelvis AP 1/5/25: No fractures seen.  - WBC now nl  - f/u orthostatics  tyl prn    # Hyperkalemia - better    # Parkinson's disease; poss mild dementia?  c/w sinemet as ordered  c/w ropinirole 1mg po q12  c/w rasagiline 1mg po q24  c/w seroquel 75mg po qhs - can also cause a balance issue (consider decr)  c/w lexapro 40mg po q24    # Fibromyalgia; RA  consider decr MS contin 30mg qhs  c/w zonisamide 50 q24    # Hypothyroidism   TSH 9.54  incr levothyrox to 100mcg q24  fu TFTs in 6 wks    # Systolic Murmur  Last echo 2019: EF 67%  OUTPT Echo     # HTN  c/w propranolol 10mg q8    # intermit asthma  c/w singulair 10 q24    # DVT prophylaxis: Lovenox    # GI prophylaxis: PPI po    # Activity: amb w/ asst and walker; PT eval    # Code status: DNR/DNI    # Pt said she uses [facility] [phone]    #Dispo: PT eval; f/u orthostatics; limit labs and testing  eventually, pt will need STR at home or SNF - f/u CM - stable for d/c

## 2025-01-06 NOTE — SWALLOW BEDSIDE ASSESSMENT ADULT - SLP PERTINENT HISTORY OF CURRENT PROBLEM
84-year-old female with past medical history of Parkinson's, fibromyalgia, RA, obesity, GERD, hypothyroidism, history of PE not on anticoagulation, who presents for mechanical ground-level fall witnessed on home security camera.  Patient was ambulating without her walker and fell backwards.  Pt fell on her buttock and then rolled back and hit her head against the door. No LOC, dizziness, syncope.  Was helped up afterwards.  Complains of pain to the back otherwise denies any chest pain, shortness of breath, headache, lightheadedness, numbness, tingling, weakness, incontinence, urinary retention, saddle anesthesia. Also denies any URI symptoms, including fever, cough. Pt said he has had multiple episodes of aspiration PNA with 2-3 admissions over the past year.

## 2025-01-07 ENCOUNTER — TRANSCRIPTION ENCOUNTER (OUTPATIENT)
Age: 85
End: 2025-01-07

## 2025-01-07 VITALS
RESPIRATION RATE: 18 BRPM | DIASTOLIC BLOOD PRESSURE: 69 MMHG | OXYGEN SATURATION: 99 % | TEMPERATURE: 98 F | SYSTOLIC BLOOD PRESSURE: 112 MMHG | HEART RATE: 67 BPM

## 2025-01-07 LAB
ANION GAP SERPL CALC-SCNC: 10 MMOL/L — SIGNIFICANT CHANGE UP (ref 7–14)
BASOPHILS # BLD AUTO: 0.02 K/UL — SIGNIFICANT CHANGE UP (ref 0–0.2)
BASOPHILS NFR BLD AUTO: 0.3 % — SIGNIFICANT CHANGE UP (ref 0–1)
BUN SERPL-MCNC: 26 MG/DL — HIGH (ref 10–20)
CALCIUM SERPL-MCNC: 8.5 MG/DL — SIGNIFICANT CHANGE UP (ref 8.4–10.5)
CHLORIDE SERPL-SCNC: 104 MMOL/L — SIGNIFICANT CHANGE UP (ref 98–110)
CO2 SERPL-SCNC: 29 MMOL/L — SIGNIFICANT CHANGE UP (ref 17–32)
CREAT SERPL-MCNC: 1 MG/DL — SIGNIFICANT CHANGE UP (ref 0.7–1.5)
CULTURE RESULTS: ABNORMAL
CULTURE RESULTS: SIGNIFICANT CHANGE UP
EGFR: 56 ML/MIN/1.73M2 — LOW
EOSINOPHIL # BLD AUTO: 0.11 K/UL — SIGNIFICANT CHANGE UP (ref 0–0.7)
EOSINOPHIL NFR BLD AUTO: 1.6 % — SIGNIFICANT CHANGE UP (ref 0–8)
FOLATE SERPL-MCNC: 5.8 NG/ML — SIGNIFICANT CHANGE UP
GLUCOSE SERPL-MCNC: 89 MG/DL — SIGNIFICANT CHANGE UP (ref 70–99)
HCT VFR BLD CALC: 39.4 % — SIGNIFICANT CHANGE UP (ref 37–47)
HGB BLD-MCNC: 12.8 G/DL — SIGNIFICANT CHANGE UP (ref 12–16)
IMM GRANULOCYTES NFR BLD AUTO: 0.6 % — HIGH (ref 0.1–0.3)
LYMPHOCYTES # BLD AUTO: 1.83 K/UL — SIGNIFICANT CHANGE UP (ref 1.2–3.4)
LYMPHOCYTES # BLD AUTO: 27 % — SIGNIFICANT CHANGE UP (ref 20.5–51.1)
MAGNESIUM SERPL-MCNC: 2.3 MG/DL — SIGNIFICANT CHANGE UP (ref 1.8–2.4)
MCHC RBC-ENTMCNC: 31.4 PG — HIGH (ref 27–31)
MCHC RBC-ENTMCNC: 32.5 G/DL — SIGNIFICANT CHANGE UP (ref 32–37)
MCV RBC AUTO: 96.6 FL — SIGNIFICANT CHANGE UP (ref 81–99)
MONOCYTES # BLD AUTO: 0.55 K/UL — SIGNIFICANT CHANGE UP (ref 0.1–0.6)
MONOCYTES NFR BLD AUTO: 8.1 % — SIGNIFICANT CHANGE UP (ref 1.7–9.3)
NEUTROPHILS # BLD AUTO: 4.24 K/UL — SIGNIFICANT CHANGE UP (ref 1.4–6.5)
NEUTROPHILS NFR BLD AUTO: 62.4 % — SIGNIFICANT CHANGE UP (ref 42.2–75.2)
NRBC # BLD: 0 /100 WBCS — SIGNIFICANT CHANGE UP (ref 0–0)
ORGANISM # SPEC MICROSCOPIC CNT: ABNORMAL
ORGANISM # SPEC MICROSCOPIC CNT: SIGNIFICANT CHANGE UP
PLATELET # BLD AUTO: 208 K/UL — SIGNIFICANT CHANGE UP (ref 130–400)
PMV BLD: 10.8 FL — HIGH (ref 7.4–10.4)
POTASSIUM SERPL-MCNC: 4.4 MMOL/L — SIGNIFICANT CHANGE UP (ref 3.5–5)
POTASSIUM SERPL-SCNC: 4.4 MMOL/L — SIGNIFICANT CHANGE UP (ref 3.5–5)
RBC # BLD: 4.08 M/UL — LOW (ref 4.2–5.4)
RBC # FLD: 13.4 % — SIGNIFICANT CHANGE UP (ref 11.5–14.5)
SODIUM SERPL-SCNC: 143 MMOL/L — SIGNIFICANT CHANGE UP (ref 135–146)
SPECIMEN SOURCE: SIGNIFICANT CHANGE UP
SPECIMEN SOURCE: SIGNIFICANT CHANGE UP
VIT B12 SERPL-MCNC: 561 PG/ML — SIGNIFICANT CHANGE UP (ref 232–1245)
WBC # BLD: 6.79 K/UL — SIGNIFICANT CHANGE UP (ref 4.8–10.8)
WBC # FLD AUTO: 6.79 K/UL — SIGNIFICANT CHANGE UP (ref 4.8–10.8)

## 2025-01-07 PROCEDURE — 74230 X-RAY XM SWLNG FUNCJ C+: CPT | Mod: 26

## 2025-01-07 PROCEDURE — 99239 HOSP IP/OBS DSCHRG MGMT >30: CPT

## 2025-01-07 RX ORDER — LEVODOPA AND CARBIDOPA 145; 36.25 MG/1; MG/1
1 CAPSULE, EXTENDED RELEASE ORAL
Qty: 0 | Refills: 0 | DISCHARGE
Start: 2025-01-07

## 2025-01-07 RX ADMIN — MONTELUKAST SODIUM 10 MILLIGRAM(S): 10 TABLET, FILM COATED ORAL at 11:17

## 2025-01-07 RX ADMIN — RASAGILINE 1 MILLIGRAM(S): 1 TABLET ORAL at 11:18

## 2025-01-07 RX ADMIN — ROPINIROLE HYDROCHLORIDE 1 MILLIGRAM(S): 1 TABLET, FILM COATED ORAL at 06:05

## 2025-01-07 RX ADMIN — ENOXAPARIN SODIUM 40 MILLIGRAM(S): 60 INJECTION INTRAVENOUS; SUBCUTANEOUS at 11:18

## 2025-01-07 RX ADMIN — ZONISAMIDE 50 MILLIGRAM(S): 100 CAPSULE ORAL at 11:18

## 2025-01-07 RX ADMIN — PANTOPRAZOLE 40 MILLIGRAM(S): 40 TABLET, DELAYED RELEASE ORAL at 06:05

## 2025-01-07 RX ADMIN — LEVODOPA AND CARBIDOPA 2 TABLET(S): 145; 36.25 CAPSULE, EXTENDED RELEASE ORAL at 06:09

## 2025-01-07 RX ADMIN — LEVOTHYROXINE SODIUM 100 MICROGRAM(S): 175 TABLET ORAL at 06:05

## 2025-01-07 RX ADMIN — ESCITALOPRAM OXALATE 40 MILLIGRAM(S): 10 TABLET ORAL at 11:17

## 2025-01-07 NOTE — PHYSICAL THERAPY INITIAL EVALUATION ADULT - IMPAIRED TRANSFERS: BED/CHAIR, REHAB EVAL
impaired balance/decreased strength [General Appearance - Alert] : alert [General Appearance - In No Acute Distress] : in no acute distress [General Appearance - Well Nourished] : well nourished [General Appearance - Well Developed] : well developed [Sclera] : the sclera and conjunctiva were normal [Outer Ear] : the ears and nose were normal in appearance [Hearing Threshold Finger Rub Not [place]] : hearing was normal [Neck Appearance] : the appearance of the neck was normal [Neck Cervical Mass (___cm)] : no neck mass was observed [] : no respiratory distress [Respiration, Rhythm And Depth] : normal respiratory rhythm and effort [Exaggerated Use Of Accessory Muscles For Inspiration] : no accessory muscle use [Auscultation Breath Sounds / Voice Sounds] : lungs were clear to auscultation bilaterally [Heart Rate And Rhythm] : heart rate was normal and rhythm regular [Heart Sounds] : normal S1 and S2 [Heart Sounds Gallop] : no gallops [Bowel Sounds] : normal bowel sounds [Abdomen Soft] : soft [FreeTextEntry1] : belly distended, shifting dullness  [Abnormal Walk] : normal gait [Nail Clubbing] : no clubbing  or cyanosis of the fingernails [Musculoskeletal - Swelling] : no joint swelling seen [Cranial Nerves] : cranial nerves 2-12 were intact [No Focal Deficits] : no focal deficits [Oriented To Time, Place, And Person] : oriented to person, place, and time [Impaired Insight] : insight and judgment were intact [Affect] : the affect was normal

## 2025-01-07 NOTE — DISCHARGE NOTE NURSING/CASE MANAGEMENT/SOCIAL WORK - FINANCIAL ASSISTANCE
[facility] provides services at a reduced cost to those who are determined to be eligible through [facility]’s financial assistance program. Information regarding [facility]’s financial assistance program can be found by going to [email or web address] or by calling [phone].

## 2025-01-07 NOTE — PROGRESS NOTE ADULT - ASSESSMENT
84-year-old woman with past medical history of Parkinson's, fibromyalgia, RA, obesity, GERD, hypothyroidism, history of PE not on anticoagulation, who presents for mechanical ground-level fall witnessed on home security camera.  Patient was ambulating without her walker and fell backwards.  Pt fell on her buttock and then rolled back and hit her head against the door. No LOC, dizziness, syncope.  Was helped up afterwards.  Complains of pain to the back otherwise denies any chest pain, shortness of breath, headache, lightheadedness, numbness, tingling, weakness, incontinence, urinary retention, saddle anesthesia. Also denies any URI symptoms, including fever, cough. Pt said he has had multiple episodes of aspiration PNA with 2-3 admissions over the past year.     # pt is immunocompromised 2/2 age    # there is NO PNEUMONIA; pt has hx of asp PNA from PD  no abx  d/c SP cx  BCx: Staph Epi 1 of 2 - this is contaminant - do NOT tx  UCx: neg  passed Sp/Sw w/ FEES for reg and thins    # Rib fractures, secondary to mechanical fall w/out LOC  - vitals stable on admission  - CTH 1/5/25: No evidence of acute intracranial pathology.  - CT Cervical Spine No Cont 1/5/25: No acute fracture or subluxation of the cervical spine.  - CT Chest w/ IV cont 1/5/25: Acute mildly displaced fractures left posterior 10th and 11th ribs. Hazy bilateral groundglass/atelectatic pulmonary changes.   - CTAP 1/5/25: No CT evidence for acute intra-abdominal or pelvic pathology.  - XRAY Pelvis AP 1/5/25: No fractures seen.  - WBC now nl  orthostatics: close to orthostatic; standing BP low - consider decr BB w/ PMD  tyl prn    # Hyperkalemia - better    # Parkinson's disease;   c/w sinemet as ordered  c/w ropinirole 1mg po q12  c/w rasagiline 1mg po q24    # depression - stable  c/w lexapro 40mg po q24 - given by neuro    # diff arising from dreams and getting back to reality at night; not quite hallucinations per family  c/w seroquel 75mg po qhs - can also cause a balance issue (consider decr) - given by neuro    # Fibromyalgia; RA  consider decr MS contin 30mg qhs + MS Contin 15mg po qAM  pt feels opiate helps her to sleep   c/w zonisamide 50 q24    # Hypothyroidism   TSH 9.54  incr levothyrox to 100mcg q24 - might help unsteadiness  fu TFTs in 6 wks    # Systolic Murmur  Last echo 2019: EF 67%  OUTPT Echo     # HTN  c/w propranolol 10mg q8 - consider decr dose    # intermit asthma  c/w singulair 10 q24    # DVT prophylaxis: Lovenox    # GI prophylaxis: PPI po    # Activity: amb w/ asst and walker; PT eval    # Code status: DNR/DNI    # Pt said [facility] uses [facility] [phone]    # updated dtr, Susan (who is getting chemo for Br Ca) via phone    #Dispo: PT eval; limit labs and testing  today, pt will need STR at home - f/u CM - stable for d/c

## 2025-01-07 NOTE — DISCHARGE NOTE PROVIDER - NSDCCPCAREPLAN_GEN_ALL_CORE_FT
PRINCIPAL DISCHARGE DIAGNOSIS  Diagnosis: Fall  Assessment and Plan of Treatment: You were admitted to the hospital with 2 broken ribs after falling at home. Your pain was controlled and you remained stable. You were given antibitiotics out of concern for pneumonia. Your thyroid hormone level came back elevate so we increased your thyroid medication dose. Please take medications as prescribed. Please follow up with your primary doctor and have repeat thyroid testing done. Return to the hospital with any new or worsening symptoms.      SECONDARY DISCHARGE DIAGNOSES  Diagnosis: Rib fracture  Assessment and Plan of Treatment:     Diagnosis: Pneumonia  Assessment and Plan of Treatment:      PRINCIPAL DISCHARGE DIAGNOSIS  Diagnosis: Fall  Assessment and Plan of Treatment: You were admitted to the hospital with 2 broken ribs after falling at home. Your pain was controlled and you remained stable. You were given antibitiotics out of concern for pneumonia. Swallowing studies showed no aspiration. Your thyroid hormone level came back elevated so we increased your thyroid medication dose. Please take medications as prescribed. Please follow up with your primary doctor and have repeat thyroid testing done in 6 weeks. Return to the hospital with any new or worsening symptoms.      SECONDARY DISCHARGE DIAGNOSES  Diagnosis: Rib fracture  Assessment and Plan of Treatment:     Diagnosis: Pneumonia  Assessment and Plan of Treatment:

## 2025-01-07 NOTE — DISCHARGE NOTE PROVIDER - CARE PROVIDER_API CALL
Jennifer Moncada  Internal Medicine  [address] [place] [id number]  Phone: [phone]  Fax: [phone]  Follow Up Time: 2 weeks

## 2025-01-07 NOTE — PROGRESS NOTE ADULT - SUBJECTIVE AND OBJECTIVE BOX
HALINAYONATAN  84y  Female  ***My note supersedes ALL resident notes that I sign.  My corrections for their notes are in my note.***    I can be reached directly via Teams or my office number is [phone] or [phone].    INTERVAL EVENTS: Here for f/u of diff walking. Pt did OK w/ PT and can go home. Pt feeling fine, no signs of PNA. Pt does have Asp PNA hx, but OK now.     T(F): 97.5 (01-07-25 @ 18:36), Max: 99.3 (01-06-25 @ 19:31)  HR: 67 (01-07-25 @ 18:36) (67 - 96)  BP: 112/69 (01-07-25 @ 18:36) (102/68 - 164/82)  RR: 18 (01-07-25 @ 18:36) (18 - 18)  SpO2: 99% (01-07-25 @ 18:36) (90% - 99%)    Gen: NAD; pvt HHA at bedside  HEENT: PERRL, EOMI, mouth clr, nose clr  Neck: no nodes, no JVD, thyroid nl  lungs: clr  hrt: s1 s2 rrr 2/6 sys murmur  abd: soft, NT/ND, no HS megaly  ext: tr edema, no c/c  neuro: aa ox3, cn intact, can move all 4 ext    LABS:                      12.8    (    96.6   6.79  )-----------( ---------      208      ( 07 Jan 2025 07:31 )             39.4    (    13.4     143   (   104   (   89      01-07-25 @ 07:31  ----------------------               4.4   (   29   (   26                             -----                        1.0  Ca  8.5   Mg  2.3    P   --     Urinalysis Basic - ( 07 Jan 2025 07:31 )    Color: x / Appearance: x / SG: x / pH: x  Gluc: 89 mg/dL / Ketone: x  / Bili: x / Urobili: x   Blood: x / Protein: x / Nitrite: x   Leuk Esterase: x / RBC: x / WBC x   Sq Epi: x / Non Sq Epi: x / Bacteria: x    Culture - Urine (collected 01-06-25 @ 01:54)  Source: Clean Catch Clean Catch (Midstream)  Final Report (01-07-25 @ 11:31):    <10,000 CFU/mL Normal Urogenital Soraya    Culture - Blood (collected 01-05-25 @ 20:56)  Source: .Blood BLOOD  Preliminary Report (01-07-25 @ 01:03):    No growth at 24 hours    Culture - Blood (collected 01-05-25 @ 20:56)  Source: .Blood BLOOD  Gram Stain (01-06-25 @ 21:33):    Growth in aerobic bottle: Gram Positive Cocci in Clusters  Preliminary Report (01-06-25 @ 21:33):    Growth in aerobic bottle: Gram Positive Cocci in Clusters    Direct identification is available within approximately 3-5    hours either by Blood Panel Multiplexed PCR or Direct    MALDI-TOF. Details: [email or web address]  Organism: Blood Culture PCR (01-06-25 @ 23:54)  Organism: Blood Culture PCR (01-06-25 @ 23:54)      Method Type: PCR      -  Staphylococcus epidermidis: Detec - contam - DO NOT TX    RADIOLOGY & ADDITIONAL TESTS:    MEDICATIONS:    acetaminophen     Tablet .. 650 milliGRAM(s) Oral every 6 hours PRN  carbidopa/levodopa  25/100 1 Tablet(s) Oral daily  carbidopa/levodopa  25/100 2 Tablet(s) Oral <User Schedule>  enoxaparin Injectable 40 milliGRAM(s) SubCutaneous every 24 hours  escitalopram 40 milliGRAM(s) Oral daily  levothyroxine 100 MICROGram(s) Oral daily  montelukast 10 milliGRAM(s) Oral daily  morphine ER Tablet 30 milliGRAM(s) Oral at bedtime  morphine ER Tablet 15 milliGRAM(s) Oral daily  pantoprazole    Tablet 40 milliGRAM(s) Oral before breakfast  propranolol 10 milliGRAM(s) Oral three times a day  QUEtiapine 75 milliGRAM(s) Oral at bedtime  rasagiline Tablet 1 milliGRAM(s) Oral daily  rOPINIRole 1 milliGRAM(s) Oral two times a day  zonisamide 50 milliGRAM(s) Oral daily  
SUBJECTIVE/OVERNIGHT EVENTS  Today is hospital day 1d. This morning patient was seen and examined at bedside, resting comfortably in bed. No acute or major events overnight.    MEDICATIONS  STANDING MEDICATIONS  carbidopa/levodopa  25/100 1 Tablet(s) Oral daily  carbidopa/levodopa  25/100 2 Tablet(s) Oral <User Schedule>  enoxaparin Injectable 40 milliGRAM(s) SubCutaneous every 24 hours  escitalopram 40 milliGRAM(s) Oral daily  levothyroxine 100 MICROGram(s) Oral daily  montelukast 10 milliGRAM(s) Oral daily  morphine ER Tablet 30 milliGRAM(s) Oral at bedtime  pantoprazole    Tablet 40 milliGRAM(s) Oral before breakfast  propranolol 10 milliGRAM(s) Oral three times a day  QUEtiapine 75 milliGRAM(s) Oral at bedtime  rasagiline Tablet 1 milliGRAM(s) Oral daily  rOPINIRole 1 milliGRAM(s) Oral two times a day  zonisamide 50 milliGRAM(s) Oral daily    PRN MEDICATIONS  acetaminophen     Tablet .. 650 milliGRAM(s) Oral every 6 hours PRN    VITALS  T(F): 98.2 (01-06-25 @ 04:57), Max: 98.9 (01-06-25 @ 00:43)  HR: 78 (01-06-25 @ 04:57) (78 - 86)  BP: 119/70 (01-06-25 @ 04:57) (92/55 - 133/81)  RR: 18 (01-06-25 @ 04:57) (18 - 18)  SpO2: 91% (01-06-25 @ 04:57) (91% - 98%)    PHYSICAL EXAM  GENERAL: NAD, lying comfortably in bed  HEENT: NCAT  NECK: supple  HEART: systolic murmur  PULM: CTAB  ABDOMEN: soft, nontender, nondistended  EXT: moves all extremities  NEURO: AOx2/3    LABS             12.4   7.82  )-----------( 220      ( 01-06-25 @ 06:11 )             38.8     140  |  101  |  30  -------------------------<  80   01-06-25 @ 06:11  4.7  |  29  |  1.0    Ca      8.3     01-06-25 @ 06:11  Mg     2.1     01-06-25 @ 06:11    TPro  6.4  /  Alb  3.8  /  TBili  0.5  /  DBili  x   /  AST  29  /  ALT  10  /  AlkPhos  81  /  GGT  x     01-06-25 @ 06:11    PT/INR - ( 01-05-25 @ 17:13 )   PT: 13.00 sec[H];   INR: 1.10 ratio  PTT - ( 01-05-25 @ 17:13 )  PTT:33.2 sec      Urinalysis Basic - ( 06 Jan 2025 06:11 )    Color: x / Appearance: x / SG: x / pH: x  Gluc: 80 mg/dL / Ketone: x  / Bili: x / Urobili: x   Blood: x / Protein: x / Nitrite: x   Leuk Esterase: x / RBC: x / WBC x   Sq Epi: x / Non Sq Epi: x / Bacteria: x          IMAGING
  YONATAN MEADE  84y  Female  ***My note supersedes ALL resident notes that I sign.  My corrections for their notes are in my note.***    I can be reached directly via Teams or my office number is [phone] or [phone].    Dr DILCIA Marshall saw and billed pt for 1/5/25.    INTERVAL EVENTS: Here for f/u of fall. Pt would like PT eval to decide on whether to have PT at home or SNF. Pt says she falls a lot. Does NOT always use her walker. Pt did not hurt herself.     T(F): 99.3 (01-06-25 @ 19:31), Max: 99.3 (01-06-25 @ 19:31)  HR: 96 (01-06-25 @ 19:31) (78 - 96)  BP: 164/82 (01-06-25 @ 19:31) (92/55 - 164/82)  RR: 18 (01-06-25 @ 19:31) (18 - 18)  SpO2: 91% (01-06-25 @ 19:31) (91% - 98%)    Gen: NAD; pvt HHA at bedside  HEENT: PERRL, EOMI, mouth clr, nose clr  Neck: no nodes, no JVD, thyroid nl  lungs: clr  hrt: s1 s2 rrr 2/6 sys murmur  abd: soft, NT/ND, no HS megaly  ext: tr edema, no c/c  neuro: aa ox3, cn intact, can move all 4 ext    LABS:                      12.4    (    98.2   7.82  )-----------( ---------      220      ( 06 Jan 2025 06:11 )             38.8    (    13.5     140   (   101   (   80      01-06-25 @ 06:11  ----------------------               4.7   (   29   (   30                             -----                        1.0  Ca  8.3   Mg  2.1    P   --     LFT  6.4  (  0.5  (  29       01-06-25 @ 06:11  -------------------------  3.8  (  81  (  10    PT/INR - ( 05 Jan 2025 17:13 )   PT: 13.00 sec;   INR: 1.10 ratio    PTT - ( 05 Jan 2025 17:13 )  PTT: 33.2 sec    Urinalysis Basic - ( 06 Jan 2025 06:11 )    Color: x / Appearance: x / SG: x / pH: x  Gluc: 80 mg/dL / Ketone: x  / Bili: x / Urobili: x   Blood: x / Protein: x / Nitrite: x   Leuk Esterase: x / RBC: x / WBC x   Sq Epi: x / Non Sq Epi: x / Bacteria: x    RADIOLOGY & ADDITIONAL TESTS:  < from: Xray Chest 1 View AP/PA (01.05.25 @ 18:45) >  Bilateral opacities.    < end of copied text >    < from: Xray Pelvis AP only (01.05.25 @ 18:44) >  IMPRESSION: No fractures seen    < end of copied text >    < from: CT Abdomen and Pelvis w/ IV Cont (01.05.25 @ 17:53) >    Acute mildly displaced fractures left posterior 10th and 11th ribs.    Hazy bilateral groundglass/atelectatic pulmonary changes.    No CT evidence for acute intra-abdominal or pelvic pathology.    < end of copied text >    < from: CT Cervical Spine No Cont (01.05.25 @ 17:49) >  No acute fracture or subluxation of the cervical spine.    < end of copied text >    < from: CT Head No Cont (01.05.25 @ 17:45) >  No evidence of acute intracranial pathology.    < end of copied text >    MEDICATIONS:    acetaminophen     Tablet .. 650 milliGRAM(s) Oral every 6 hours PRN  carbidopa/levodopa  25/100 1 Tablet(s) Oral daily  carbidopa/levodopa  25/100 2 Tablet(s) Oral <User Schedule>  enoxaparin Injectable 40 milliGRAM(s) SubCutaneous every 24 hours  escitalopram 40 milliGRAM(s) Oral daily  levothyroxine 100 MICROGram(s) Oral daily  montelukast 10 milliGRAM(s) Oral daily  morphine ER Tablet 30 milliGRAM(s) Oral at bedtime  pantoprazole    Tablet 40 milliGRAM(s) Oral before breakfast  propranolol 10 milliGRAM(s) Oral three times a day  QUEtiapine 75 milliGRAM(s) Oral at bedtime  rasagiline Tablet 1 milliGRAM(s) Oral daily  rOPINIRole 1 milliGRAM(s) Oral two times a day  zonisamide 50 milliGRAM(s) Oral daily

## 2025-01-07 NOTE — DISCHARGE NOTE PROVIDER - NSDCFUSCHEDAPPT_GEN_ALL_CORE_FT
Shaunna Simon Physician [facility]  [address]ULED [address]  Scheduled Appointment: 01/08/2025    [facility] [address]  Scheduled Appointment: 03/03/2025

## 2025-01-07 NOTE — DISCHARGE NOTE PROVIDER - HOSPITAL COURSE
84-year-old female with past medical history of Parkinson's, fibromyalgia, arthritis, obesity, GERD, hypothyroidism, history of PE not on anticoagulation, who presents for mechanical ground-level fall witnessed on home security camera.  Patient was ambulating without her walker and fell backwards.  Pt fell on her buttock and then rolled back and hit her head against the door. No LOC, dizziness, syncope.  Was helped up afterwards.  Complains of pain to the back otherwise denies any chest pain, shortness of breath, headache, lightheadedness, numbness, tingling, weakness, incontinence, urinary retention, saddle anesthesia. Also denies any URI symptoms, including fever, cough. Pt said he has had multiple episodes of aspiration PNA with 2-3 admissions over the past year.     Hospital Course:  # Rib fractures, secondary to mechanical fall w/out LOC  - vitals stable on admission  - CTH 1/5/25: No evidence of acute intracranial pathology.  - CT Cervical Spine No Cont 1/5/25: No acute fracture or subluxation of the cervical spine.  - CT Chest w/ IV cont 1/5/25: Acute mildly displaced fractures left posterior 10th and 11th ribs. Hazy bilateral groundglass/atelectatic pulmonary changes.   - CTAP 1/5/25: No CT evidence for acute intra-abdominal or pelvic pathology.  - XRAY Pelvis AP 1/5/25: No fractures seen.  - WBC now nl  - orthostatics negative  - tyl prn    # there is NO PNEUMONIA  - no abx  - f/u BCx  - f/u UCx    # Hyperkalemia - better    # Parkinson's disease; poss mild dementia?  - c/w sinemet as ordered  - c/w ropinirole 1mg po q12  - c/w rasagiline 1mg po q24  - c/w seroquel 75mg po qhs - can also cause a balance issue (consider decr)  - c/w lexapro 40mg po q24    # Fibromyalgia; RA  - consider decr MS contin 30mg qhs  - c/w zonisamide 50 q24    # Hypothyroidism   - TSH 9.54  - incr levothyrox to 100mcg q24  - fu TFTs in 6 wks    # Systolic Murmur  - Last echo 2019: EF 67%  - OUTPT Echo     # HTN  - c/w propranolol 10mg q8    # intermit asthma  c/w singulair 10 q24    Discussion of discharge plan of care, including discharge diagnoses, medication reconciliation, and follow-ups was conducted with Dr. Patterson on *****, and discharge was approved. 84-year-old female with past medical history of Parkinson's, fibromyalgia, arthritis, obesity, GERD, hypothyroidism, history of PE not on anticoagulation, who presents for mechanical ground-level fall witnessed on home security camera.  Patient was ambulating without her walker and fell backwards.  Pt fell on her buttock and then rolled back and hit her head against the door. No LOC, dizziness, syncope.  Was helped up afterwards.  Complains of pain to the back otherwise denies any chest pain, shortness of breath, headache, lightheadedness, numbness, tingling, weakness, incontinence, urinary retention, saddle anesthesia. Also denies any URI symptoms, including fever, cough. Pt said he has had multiple episodes of aspiration PNA with 2-3 admissions over the past year.     Hospital Course:  # Rib fractures, secondary to mechanical fall w/out LOC  - vitals stable on admission  - CTH 1/5/25: No evidence of acute intracranial pathology.  - CT Cervical Spine No Cont 1/5/25: No acute fracture or subluxation of the cervical spine.  - CT Chest w/ IV cont 1/5/25: Acute mildly displaced fractures left posterior 10th and 11th ribs. Hazy bilateral groundglass/atelectatic pulmonary changes.   - CTAP 1/5/25: No CT evidence for acute intra-abdominal or pelvic pathology.  - XRAY Pelvis AP 1/5/25: No fractures seen.  - WBC now nl  - orthostatics negative  - tyl prn    # h/o of aspiration PNA  - swallow studies no aspiration    # there is NO PNEUMONIA  - no abx  - f/u BCx  - f/u UCx    # Hyperkalemia - better    # Parkinson's disease; poss mild dementia?  - c/w sinemet as ordered  - c/w ropinirole 1mg po q12  - c/w rasagiline 1mg po q24  - c/w seroquel 75mg po qhs - can also cause a balance issue (consider decr)  - c/w lexapro 40mg po q24    # Fibromyalgia; RA  - consider decr MS contin 30mg qhs  - c/w zonisamide 50 q24    # Hypothyroidism   - TSH 9.54  - incr levothyrox to 100mcg q24  - fu TFTs in 6 wks    # Systolic Murmur  - Last echo 2019: EF 67%  - OUTPT Echo     # HTN  - c/w propranolol 10mg q8    # intermit asthma  c/w singulair 10 q24    Discussion of discharge plan of care, including discharge diagnoses, medication reconciliation, and follow-ups was conducted with Dr. Patterson on 1/7/25, and discharge was approved.

## 2025-01-07 NOTE — DISCHARGE NOTE NURSING/CASE MANAGEMENT/SOCIAL WORK - PATIENT PORTAL LINK FT
You can access the FollowMyHealth Patient Portal offered by [facility] by registering at the following website: [email or web address]. By joining [employer]’s FollowMyHealth portal, you will also be able to view your health information using other applications (apps) compatible with our system.

## 2025-01-07 NOTE — SWALLOW VFSS/MBS ASSESSMENT ADULT - ORAL PREP COMMENTS
dentures not present during todays exam, however oral phase is known to be within functional limits with dentures in place.

## 2025-01-07 NOTE — DISCHARGE NOTE PROVIDER - NSDCMRMEDTOKEN_GEN_ALL_CORE_FT
carbidopa-levodopa 50 mg-200 mg oral tablet, extended release: 1 tab(s) orally 2 times a day  levothyroxine 88 mcg (0.088 mg) oral tablet: 1 tab(s) orally once a day  Lexapro 20 mg oral tablet: 2 tab(s) orally once a day  montelukast 10 mg oral tablet: 1 tab(s) orally once a day  morphine 15 mg/12 hr oral tablet, extended release: 1 tab(s) orally once a day, As Needed  morphine 30 mg/12 hr oral tablet, extended release: 1 tab(s) orally once a day (at bedtime)  propranolol 10 mg oral tablet: 1 tab(s) orally 3 times a day  rasagiline 1 mg oral tablet: 1 tab(s) orally once a day  rOPINIRole 1 mg oral tablet: 1 tab(s) orally 2 times a day  Savella 25 mg oral tablet: 1 tab(s) orally 2 times a day  Seroquel 25 mg oral tablet: 3 tab(s) orally once a day (at bedtime)  zonisamide 50 mg oral capsule: 1 cap(s) orally once a day   carbidopa-levodopa 25 mg-100 mg oral tablet: 1 tab(s) orally once a day at 6 pm  carbidopa-levodopa 50 mg-200 mg oral tablet, extended release: 1 tab(s) orally 2 times a day at 6 am and 12 pm  levothyroxine 88 mcg (0.088 mg) oral tablet: 1 tab(s) orally once a day  Lexapro 20 mg oral tablet: 2 tab(s) orally once a day  montelukast 10 mg oral tablet: 1 tab(s) orally once a day  morphine 15 mg/12 hr oral tablet, extended release: 1 tab(s) orally once a day  morphine 30 mg/12 hr oral tablet, extended release: 1 tab(s) orally once a day (at bedtime)  propranolol 10 mg oral tablet: 1 tab(s) orally 3 times a day  rasagiline 1 mg oral tablet: 1 tab(s) orally once a day  rOPINIRole 1 mg oral tablet: 1 tab(s) orally 2 times a day  Savella 25 mg oral tablet: 1 tab(s) orally 2 times a day  Seroquel 25 mg oral tablet: 3 tab(s) orally once a day (at bedtime)  zonisamide 50 mg oral capsule: 1 cap(s) orally once a day

## 2025-01-07 NOTE — SWALLOW VFSS/MBS ASSESSMENT ADULT - ADDITIONAL RECOMMENDATIONS
How Severe Is It?: moderate Is This A New Presentation, Or A Follow-Up?: Nail Dystrophy D/C SLP. Reconsult PRN

## 2025-01-07 NOTE — PHYSICAL THERAPY INITIAL EVALUATION ADULT - GENERAL OBSERVATIONS, REHAB EVAL
PT IE [id number]. Chart reviewed. pt encountered semi-reclined in bed. In NADS. + IV lock. Pt is alert and is able to follow commands. ofnote: pt states her BP always runs low

## 2025-01-07 NOTE — SWALLOW VFSS/MBS ASSESSMENT ADULT - SLP GENERAL OBSERVATIONS
pt received in radiology suite on RA via wheelchair. Transferred with assistance to [facility] chair.

## 2025-01-08 ENCOUNTER — APPOINTMENT (OUTPATIENT)
Dept: PULMONOLOGY | Facility: CLINIC | Age: 85
End: 2025-01-08
Payer: MEDICARE

## 2025-01-08 VITALS
DIASTOLIC BLOOD PRESSURE: 66 MMHG | BODY MASS INDEX: 34.34 KG/M2 | OXYGEN SATURATION: 92 % | WEIGHT: 170 LBS | HEART RATE: 84 BPM | RESPIRATION RATE: 14 BRPM | SYSTOLIC BLOOD PRESSURE: 120 MMHG

## 2025-01-08 DIAGNOSIS — R13.10 DYSPHAGIA, UNSPECIFIED: ICD-10-CM

## 2025-01-08 DIAGNOSIS — S22.49XA MULTIPLE FRACTURES OF RIBS, UNSPECIFIED SIDE, INITIAL ENCOUNTER FOR CLOSED FRACTURE: ICD-10-CM

## 2025-01-08 DIAGNOSIS — G20.A1 PARKINSON'S DISEASE WITHOUT DYSKINESIA, WITHOUT MENTION OF FLUCTUATIONS: ICD-10-CM

## 2025-01-08 DIAGNOSIS — J69.0 PNEUMONITIS DUE TO INHALATION OF FOOD AND VOMIT: ICD-10-CM

## 2025-01-08 DIAGNOSIS — J45.909 UNSPECIFIED ASTHMA, UNCOMPLICATED: ICD-10-CM

## 2025-01-08 PROCEDURE — 99213 OFFICE O/P EST LOW 20 MIN: CPT

## 2025-01-08 PROCEDURE — G2211 COMPLEX E/M VISIT ADD ON: CPT

## 2025-01-11 LAB
CULTURE RESULTS: SIGNIFICANT CHANGE UP
SPECIMEN SOURCE: SIGNIFICANT CHANGE UP

## 2025-01-14 DIAGNOSIS — D84.89 OTHER IMMUNODEFICIENCIES: ICD-10-CM

## 2025-01-14 DIAGNOSIS — Z86.711 PERSONAL HISTORY OF PULMONARY EMBOLISM: ICD-10-CM

## 2025-01-14 DIAGNOSIS — Z86.19 PERSONAL HISTORY OF OTHER INFECTIOUS AND PARASITIC DISEASES: ICD-10-CM

## 2025-01-14 DIAGNOSIS — Z03.89 ENCOUNTER FOR OBSERVATION FOR OTHER SUSPECTED DISEASES AND CONDITIONS RULED OUT: ICD-10-CM

## 2025-01-14 DIAGNOSIS — J45.20 MILD INTERMITTENT ASTHMA, UNCOMPLICATED: ICD-10-CM
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
## 2025-01-14 DIAGNOSIS — E03.9 HYPOTHYROIDISM, UNSPECIFIED: ICD-10-CM

## 2025-01-14 DIAGNOSIS — F02.80 DEMENTIA IN OTHER DISEASES CLASSIFIED ELSEWHERE, UNSPECIFIED SEVERITY, WITHOUT BEHAVIORAL DISTURBANCE, PSYCHOTIC DISTURBANCE, MOOD DISTURBANCE, AND ANXIETY: ICD-10-CM

## 2025-01-14 DIAGNOSIS — J30.2 OTHER SEASONAL ALLERGIC RHINITIS: ICD-10-CM

## 2025-01-14 DIAGNOSIS — M79.7 FIBROMYALGIA: ICD-10-CM

## 2025-01-14 DIAGNOSIS — Z79.890 HORMONE REPLACEMENT THERAPY: ICD-10-CM

## 2025-01-14 DIAGNOSIS — Z88.6 ALLERGY STATUS TO ANALGESIC AGENT: ICD-10-CM

## 2025-01-14 DIAGNOSIS — Z79.891 LONG TERM (CURRENT) USE OF OPIATE ANALGESIC: ICD-10-CM

## 2025-01-14 DIAGNOSIS — Z98.84 BARIATRIC SURGERY STATUS: ICD-10-CM

## 2025-01-14 DIAGNOSIS — E87.5 HYPERKALEMIA: ICD-10-CM
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
## 2025-01-14 DIAGNOSIS — F32.A DEPRESSION, UNSPECIFIED: ICD-10-CM

## 2025-01-14 DIAGNOSIS — Z79.899 OTHER LONG TERM (CURRENT) DRUG THERAPY: ICD-10-CM

## 2025-01-14 DIAGNOSIS — G47.33 OBSTRUCTIVE SLEEP APNEA (ADULT) (PEDIATRIC): ICD-10-CM

## 2025-01-14 DIAGNOSIS — Z66 DO NOT RESUSCITATE: ICD-10-CM

## 2025-01-14 DIAGNOSIS — L40.50 ARTHROPATHIC PSORIASIS, UNSPECIFIED: ICD-10-CM

## 2025-01-14 DIAGNOSIS — S22.42XA MULTIPLE FRACTURES OF RIBS, LEFT SIDE, INITIAL ENCOUNTER FOR CLOSED FRACTURE: ICD-10-CM

## 2025-01-14 DIAGNOSIS — G20.A1 PARKINSON'S DISEASE WITHOUT DYSKINESIA, WITHOUT MENTION OF FLUCTUATIONS: ICD-10-CM

## 2025-01-14 DIAGNOSIS — Z91.048 OTHER NONMEDICINAL SUBSTANCE ALLERGY STATUS: ICD-10-CM

## 2025-01-14 DIAGNOSIS — Z99.89 DEPENDENCE ON OTHER ENABLING MACHINES AND DEVICES: ICD-10-CM

## 2025-01-14 DIAGNOSIS — K21.9 GASTRO-ESOPHAGEAL REFLUX DISEASE WITHOUT ESOPHAGITIS: ICD-10-CM

## 2025-01-14 DIAGNOSIS — R01.1 CARDIAC MURMUR, UNSPECIFIED: ICD-10-CM

## 2025-01-18 NOTE — DISCHARGE NOTE ADULT - MEDICATION SUMMARY - MEDICATIONS TO CHANGE
I will SWITCH the dose or number of times a day I take the medications listed below when I get home from the hospital:  None [Fatigue] : fatigue [Vision Problems] : vision problems [SOB on Exertion] : shortness of breath during exertion [Joint Pain] : joint pain [Dizziness] : dizziness [Insomnia] : insomnia [Depression] : depression [Easy Bruising] : a tendency for easy bruising [Negative] : Endocrine [Recent Change In Weight] : ~T no recent weight change [Incontinence] : no incontinence [[id number]] : Back, legs [de-identified] : Under her psychiatrist's care

## 2025-03-03 ENCOUNTER — APPOINTMENT (OUTPATIENT)
Dept: PULMONOLOGY | Facility: CLINIC | Age: 85
End: 2025-03-03